# Patient Record
Sex: FEMALE | Race: WHITE | NOT HISPANIC OR LATINO | URBAN - NONMETROPOLITAN AREA
[De-identification: names, ages, dates, MRNs, and addresses within clinical notes are randomized per-mention and may not be internally consistent; named-entity substitution may affect disease eponyms.]

---

## 2022-11-03 ENCOUNTER — OFFICE VISIT (OUTPATIENT)
Dept: FAMILY MEDICINE CLINIC | Facility: CLINIC | Age: 27
End: 2022-11-03

## 2022-11-03 VITALS
DIASTOLIC BLOOD PRESSURE: 76 MMHG | HEART RATE: 67 BPM | RESPIRATION RATE: 16 BRPM | BODY MASS INDEX: 24.44 KG/M2 | HEIGHT: 69 IN | TEMPERATURE: 99.3 F | SYSTOLIC BLOOD PRESSURE: 112 MMHG | WEIGHT: 165 LBS

## 2022-11-03 DIAGNOSIS — Z23 ENCOUNTER FOR IMMUNIZATION: ICD-10-CM

## 2022-11-03 DIAGNOSIS — N63.12 MASS OF UPPER INNER QUADRANT OF RIGHT BREAST: Primary | ICD-10-CM

## 2022-11-03 DIAGNOSIS — Z23 NEEDS FLU SHOT: ICD-10-CM

## 2022-11-03 DIAGNOSIS — Z76.89 ENCOUNTER TO ESTABLISH CARE: ICD-10-CM

## 2022-11-03 DIAGNOSIS — R19.8 ALTERNATING CONSTIPATION AND DIARRHEA: ICD-10-CM

## 2022-11-03 DIAGNOSIS — N64.4 BREAST PAIN, RIGHT: ICD-10-CM

## 2022-11-03 PROCEDURE — 90471 IMMUNIZATION ADMIN: CPT | Performed by: NURSE PRACTITIONER

## 2022-11-03 PROCEDURE — 99204 OFFICE O/P NEW MOD 45 MIN: CPT | Performed by: NURSE PRACTITIONER

## 2022-11-03 PROCEDURE — 90686 IIV4 VACC NO PRSV 0.5 ML IM: CPT | Performed by: NURSE PRACTITIONER

## 2022-11-03 NOTE — PROGRESS NOTES
Subjective     Chief Complaint   Patient presents with   • Establish Care   • Breast Pain     Right side    • Breast Mass       History of Present Illness    Masses and pain in right breast that started in March of this year.  Had US, MRI, and mammogram of breast in [place] that were inconclusive 6 months ago.  Ultrasound showed 7mm mass in upper inner right breast.  Strong family history of breast cancer.  Pain was so intense that she was taking percocet but states it did not help.  Pain is 8/10 constant ache with intermittent sharpness in right breast.  Pain radiates in to right shoulder. She has not had any biopsies or genetic testing done yet because of the timing of her move.   She has had colposcopies before for abnormal pap smears.  She had a partial hysterectomy in 2021.      She has fecal incontinence and fecal urgency since 2017, after her gastric bypass surgery.  Bowel pattern is irregular.  No BM for 3-4 days and then will have liquid yellow stool.  She has tried diet modifications without any improvement in symptoms.  Does not take any medication for constipation or diarrhea.      She is establishing care.      Patient's PMR from outside medical facility reviewed and noted.    Review of Systems   Constitutional: Negative for activity change, appetite change, fatigue and fever.   Respiratory: Negative for cough and shortness of breath.    Cardiovascular: Negative for chest pain.        Otherwise complete ROS reviewed and negative except as mentioned in the HPI.    Past Medical History:   Past Medical History:   Diagnosis Date   • Anemia    • Anxiety    • Depression    • Headache    • Obesity      Past Surgical History:  Past Surgical History:   Procedure Laterality Date   • BARIATRIC SURGERY     •  SECTION     • CHOLECYSTECTOMY     • COSMETIC SURGERY      tummy tuck   • HYSTERECTOMY     • TONSILLECTOMY       Social History:  reports that she has never smoked. She has  "never used smokeless tobacco. She reports that she does not drink alcohol and does not use drugs.    Family History: family history includes Breast cancer in her maternal grandmother and paternal grandmother; Breast cancer (age of onset: 41) in her mother; Cancer in her mother; Heart disease in her father; Hyperlipidemia in her father; Hypertension in her father.      Allergies:  Allergies   Allergen Reactions   • Nsaids Other (See Comments)     Previous weight loss surgery     Medications:  Prior to Admission medications    Not on File       PHQ-9 Depression Screening  Little interest or pleasure in doing things? 0-->not at all   Feeling down, depressed, or hopeless? 0-->not at all   Trouble falling or staying asleep, or sleeping too much?     Feeling tired or having little energy?     Poor appetite or overeating?     Feeling bad about yourself - or that you are a failure or have let yourself or your family down?     Trouble concentrating on things, such as reading the newspaper or watching television?     Moving or speaking so slowly that other people could have noticed? Or the opposite - being so fidgety or restless that you have been moving around a lot more than usual?     Thoughts that you would be better off dead, or of hurting yourself in some way?     PHQ-9 Total Score 0   If you checked off any problems, how difficult have these problems made it for you to do your work, take care of things at home, or get along with other people?         PHQ-9 Total Score: 0   0 (Negative screening for depression)      Objective     Vital Signs: /76 (BP Location: Left arm, Patient Position: Sitting, Cuff Size: Adult)   Pulse 67   Temp 99.3 °F (37.4 °C) (Infrared)   Resp 16   Ht 175.3 cm (69\")   Wt 74.8 kg (165 lb)   BMI 24.37 kg/m²   Physical Exam  Vitals and nursing note reviewed.   Constitutional:       Appearance: Normal appearance.   HENT:      Head: Normocephalic.      Nose: Nose normal.      Mouth/Throat: "      Mouth: Mucous membranes are moist.   Eyes:      Extraocular Movements: Extraocular movements intact.      Pupils: Pupils are equal, round, and reactive to light.   Cardiovascular:      Rate and Rhythm: Normal rate and regular rhythm.      Pulses: Normal pulses.      Heart sounds: Normal heart sounds.   Pulmonary:      Effort: Pulmonary effort is normal.      Breath sounds: Normal breath sounds.   Chest:   Breasts:     Right: Mass (Small, firm mass with irregular borders palpated in RUQ) and tenderness present. No swelling, bleeding, inverted nipple, nipple discharge or skin change.      Left: Normal.   Abdominal:      General: Bowel sounds are normal.      Palpations: Abdomen is soft.   Musculoskeletal:         General: Normal range of motion.      Cervical back: Normal range of motion.   Skin:     General: Skin is warm and dry.   Neurological:      General: No focal deficit present.      Mental Status: She is alert and oriented to person, place, and time.   Psychiatric:         Mood and Affect: Mood normal.         Behavior: Behavior normal.         Thought Content: Thought content normal.         Judgment: Judgment normal.         BMI is within normal parameters. No other follow-up for BMI required.         Results Reviewed:  No results found for: GLUCOSE, BUN, CREATININE, NA, K, CL, CO2, CALCIUM, ALT, AST, WBC, HCT, PLT, CHOL, TRIG, HDL, LDL, LDLHDL, HGBA1C      Assessment / Plan     Assessment/Plan     Diagnoses and all orders for this visit:    1. Mass of upper inner quadrant of right breast (Primary)  -     Mammo Diagnostic Digital Tomosynthesis Bilateral With CAD; Future  -     Cancel: Ambulatory Referral to Obstetrics / Gynecology  -     Cancel: US Breast Right Complete; Future  -     MRI Breast Right With & Without Contrast; Future  -     Ambulatory Referral to General Surgery    2. Breast pain, right  -     Mammo Diagnostic Digital Tomosynthesis Bilateral With CAD; Future  -     Cancel: Ambulatory  Referral to Obstetrics / Gynecology  -     Cancel: US Breast Right Complete; Future  -     MRI Breast Right With & Without Contrast; Future  -     Ambulatory Referral to General Surgery    3. Alternating constipation and diarrhea  -     Ambulatory Referral to Gastroenterology    4. Encounter to establish care    5. Encounter for immunization  -     FluLaval/Fluzone >6 mos ([id number])    6. Needs flu shot  -     FluLaval/Fluzone >6 mos ([id number])      Time spent with patient: 35 minutes       An After Visit Summary was printed and given to the patient at discharge.  Return in about 4 weeks (around 12/1/2022) for Annual physical.    I have discussed the patient results/orders and plan/recommendation with them at today's visit.      Nicol Paula, APRN   11/03/2022

## 2022-11-07 ENCOUNTER — TRANSCRIBE ORDERS (OUTPATIENT)
Dept: ADMINISTRATIVE | Facility: HOSPITAL | Age: 27
End: 2022-11-07

## 2022-11-08 ENCOUNTER — HOSPITAL ENCOUNTER (OUTPATIENT)
Dept: ULTRASOUND IMAGING | Facility: HOSPITAL | Age: 27
Discharge: HOME | End: 2022-11-08

## 2022-11-08 ENCOUNTER — TELEPHONE (OUTPATIENT)
Dept: FAMILY MEDICINE CLINIC | Facility: CLINIC | Age: 27
End: 2022-11-08

## 2022-11-08 ENCOUNTER — HOSPITAL ENCOUNTER (OUTPATIENT)
Dept: MAMMOGRAPHY | Facility: HOSPITAL | Age: 27
Discharge: HOME | End: 2022-11-08

## 2022-11-08 DIAGNOSIS — N63.20 LEFT BREAST LUMP: ICD-10-CM

## 2022-11-08 DIAGNOSIS — N64.4 BREAST PAIN, RIGHT: ICD-10-CM

## 2022-11-08 DIAGNOSIS — N63.12 MASS OF UPPER INNER QUADRANT OF RIGHT BREAST: ICD-10-CM

## 2022-11-08 PROCEDURE — G0279 TOMOSYNTHESIS, MAMMO: HCPCS

## 2022-11-08 PROCEDURE — 76642 ULTRASOUND BREAST LIMITED: CPT

## 2022-11-08 PROCEDURE — 77066 DX MAMMO INCL CAD BI: CPT

## 2022-11-08 NOTE — TELEPHONE ENCOUNTER
Pt called to update us on what happened at her US today as well as give a more detailed symptom explanation of the mass in breast.    PT says the mass has definitely grown since she discovered it in [place]. PT says the pain has also spread from being localized in her breast to pain in her breast, shoulder, and even starting to feel in throat area. Pt is open to a preemptive removal.. she stated she has been doing research on her own and looking at the cancer center info from where her mom went. Pt mentioned she definitely feels she is having issue with inflammation and the spot being warm to the touch. Pt also states she was told where the mass is it didn't seem to show anything, but there was image around that area.    Results are in chart for you to look at. Pt also has referral routed to your basket due to needing to change to a referral.

## 2022-11-11 ENCOUNTER — HOSPITAL ENCOUNTER (OUTPATIENT)
Dept: MRI IMAGING | Facility: HOSPITAL | Age: 27
Discharge: HOME | End: 2022-11-11
Admitting: NURSE PRACTITIONER

## 2022-11-11 DIAGNOSIS — N64.4 BREAST PAIN, RIGHT: ICD-10-CM

## 2022-11-11 DIAGNOSIS — N63.12 MASS OF UPPER INNER QUADRANT OF RIGHT BREAST: ICD-10-CM

## 2022-11-11 LAB — CREAT BLDA-MCNC: 0.7 MG/DL (ref 0.6–1.3)

## 2022-11-11 PROCEDURE — 0 GADOBENATE DIMEGLUMINE 529 MG/ML SOLUTION: Performed by: NURSE PRACTITIONER

## 2022-11-11 PROCEDURE — A9577 INJ MULTIHANCE: HCPCS | Performed by: NURSE PRACTITIONER

## 2022-11-11 PROCEDURE — 82565 ASSAY OF CREATININE: CPT

## 2022-11-11 PROCEDURE — 77049 MRI BREAST C-+ W/CAD BI: CPT

## 2022-11-11 RX ADMIN — GADOBENATE DIMEGLUMINE 15 ML: 529 INJECTION, SOLUTION INTRAVENOUS at 10:19

## 2022-11-14 ENCOUNTER — APPOINTMENT (OUTPATIENT)
Dept: LAB | Facility: HOSPITAL | Age: 27
End: 2022-11-14

## 2022-11-14 ENCOUNTER — OFFICE VISIT (OUTPATIENT)
Dept: SURGERY | Facility: CLINIC | Age: 27
End: 2022-11-14

## 2022-11-14 VITALS
HEIGHT: 69 IN | SYSTOLIC BLOOD PRESSURE: 118 MMHG | DIASTOLIC BLOOD PRESSURE: 80 MMHG | BODY MASS INDEX: 24.42 KG/M2 | HEART RATE: 70 BPM | WEIGHT: 164.9 LBS | TEMPERATURE: 97.5 F

## 2022-11-14 DIAGNOSIS — N64.4 BREAST PAIN, RIGHT: ICD-10-CM

## 2022-11-14 DIAGNOSIS — N63.12 MASS OF UPPER INNER QUADRANT OF RIGHT BREAST: Primary | ICD-10-CM

## 2022-11-14 DIAGNOSIS — Z80.3 FAMILY HISTORY OF BREAST CANCER: Primary | ICD-10-CM

## 2022-11-14 DIAGNOSIS — Z80.3 FAMILY HISTORY OF MALIGNANT NEOPLASM OF BREAST: ICD-10-CM

## 2022-11-14 DIAGNOSIS — N64.4 MASTODYNIA: ICD-10-CM

## 2022-11-14 DIAGNOSIS — D24.1 FIBROADENOMA OF BREAST, RIGHT: ICD-10-CM

## 2022-11-14 DIAGNOSIS — Z91.89 INCREASED RISK OF BREAST CANCER: ICD-10-CM

## 2022-11-14 PROCEDURE — 99204 OFFICE O/P NEW MOD 45 MIN: CPT | Performed by: STUDENT IN AN ORGANIZED HEALTH CARE EDUCATION/TRAINING PROGRAM

## 2022-11-14 NOTE — PROGRESS NOTES
Office New Patient History and Physical:     Referring Provider: Nicol Paula*    Chief Complaint   Patient presents with   • Abnormal Breast Imaging        Subjective .     History of present illness:  Rena Quiroz is a 27 y.o. female who presented to her PCP with a right breast mass that she first noticed in . She had an US and MRI in [place] 6 months ago which were inconclusive. She has a significant family history of breat cancer. She also endorses right breast pain that required percocet and is an 8/10. She describes it as aching and sharp. It radiates to her R shoulder. She has not had genetic testing nor a biopsy. Her PCP felt a mass in the upper inner quadrant of the R breast with irregular borders. She continues to complain of right medial upper breat pain. On the bad days it covers her whole breast. She has tried many different bras, lidocaine patches, antibiotics, percocet - nothing helped with the pain     Breast History information:   Prior abnormal mammograms: yes 6 month ago with likely benign fibroadenoma   Prior breast biopsies: none   Palpable breast masses: yes upper inner right breast   Nipple discharge: none   Age at first menses: 10  Age at menopause: s/p partial hysterectomy in    Number of biological children: 2  Age at first birth: 25  Years on birth control: 7  Years on HRT: none   Family history of breast cancer: Maternal grandmother, paternal grandmother, mother at age 41   Smoking History: none   Alcohol use: none   BMI: 24     She is not on blood thinners. She has not had genetic testing.     History  Past Medical History:   Diagnosis Date   • Anemia    • Anxiety    • Depression    • Headache    • Obesity    ,   Past Surgical History:   Procedure Laterality Date   • BARIATRIC SURGERY     •  SECTION     • CHOLECYSTECTOMY     • COSMETIC SURGERY      tummy tuck   • HYSTERECTOMY     • TONSILLECTOMY     ,   Family History   Problem Relation Age of Onset   •  "Breast cancer Mother 41   • Cancer Mother    • Hypertension Father    • Hyperlipidemia Father    • Heart disease Father    • Breast cancer Maternal Grandmother    • Breast cancer Paternal Grandmother    ,   Social History     Tobacco Use   • Smoking status: Never   • Smokeless tobacco: Never   Vaping Use   • Vaping Use: Never used   Substance Use Topics   • Alcohol use: Never   • Drug use: Never   , (Not in a hospital admission)   and Allergies:  Nsaids    Current Outpatient Medications:   •  Diclofenac Sodium (Voltaren) 1 % gel gel, Apply 4 g topically to the appropriate area as directed 4 (Four) Times a Day As Needed (right breast pain)., Disp: 100 g, Rfl: 1    Objective     Vital Signs   /80 (BP Location: Left arm, Patient Position: Sitting, Cuff Size: Adult)   Pulse 70   Temp 97.5 °F (36.4 °C)   Ht 175.3 cm (69.02\")   Wt 74.8 kg (164 lb 14.5 oz)   BMI 24.34 kg/m²      Physical Exam:  General appearance - alert, well appearing, and in no distress  Mental status - alert, oriented to person, place, and time  Eyes - pupils equal and reactive, extraocular eye movements intact  Neck - supple, no significant adenopathy  Chest - no tachypnea, retractions or cyanosis  Heart - normal rate and regular rhythm  Abdomen - soft, nontender, nondistended, no masses or organomegaly  Neurological - alert, oriented, normal speech, no focal findings or movement disorder noted  Musculoskeletal - no joint tenderness, deformity or swelling  Breast Exam: Bilateral breasts without obvious deformities. Bilateral nipples everted. Patient examined in the supine position with the ipsilateral arm above the head. Right upper inner breast <1cm well circumscribed, non-fixed mass. Tender. No other palpable masses bilaterally. No nipple discharge bilaterally. No palpable axillary nor supraclavicular adenopathy bilaterally.         Results Review:    The following data was reviewed by: Sharita Chavira MD on 11/14/2022:    Mammo " Diagnostic Digital Tomosynthesis Bilateral With CAD (11/08/2022 13:48)  1. No sonographic correlate for the areas of discomfort and masslike  densities in the medial aspect of both breasts. There is also no  mammographic correlate. There is a benign-appearing intramammary lymph  node in the upper outer quadrant of the right breast at the 10:00  position documented by ultrasound.  2. Recommend close clinical follow-up of the areas of palpable  abnormality. The patient has undergone previous outside breast MRI for which 6 month follow-up imaging was recommended. The patient is scheduled for a follow-up MRI at this facility later in the week.  3. ACR BI-RADS Category 2 benign findings negative.  US Breast Bilateral Limited (11/08/2022 14:39)  1.. Small intramammary lymph node upper outer quadrant right breast.  2. No sonographic correlate for the areas of palpable abnormality within either breast.  3. ACR BI-RADS 2 benign findings negative.  MRI Breast Bilateral With & Without Contrast (11/11/2022 10:18)  1. Stable 7 to 8 mm enhancing lesion within the upper inner right breast  near 2:00 with type I benign contrast kinetics remains stable compared  to the outside MRI breast exam from 4/11/2022.. With the benign contrast  kinetics and stability, a benign fibroadenomas favored. No discrete  ultrasound correlate based on recent ultrasound exam. Findings are  consistent with a probably benign study strongly favoring fibroadenoma.  A six-month follow-up diagnostic mammogram recommended with an MRI breast exam to establish stability over greater than one year.  2. No suspicious abnormal enhancement of the left breast. Few  subcentimeter benign cysts of the medial left breast at 8-9 o'clock.  3. No pathologic axillary or internal mammary lymphadenopathy.  4. BI-RADS 3-probably benign exam. Probably benign fibroadenoma within  the upper inner right breast at 2:00 as described above. Six-month  follow-up diagnostic mammogram  and MRI breast study recommended to  assure stability over greater than one year.  Progress Notes by Nicol Paula APRN (11/03/2022 14:00)    Assessment & Plan       Diagnoses and all orders for this visit:    1. Mass of upper inner quadrant of right breast (Primary)  -     MRI Breast Bilateral With & Without Contrast; Future  -     US Breast Right Limited; Future    2. Family history of malignant neoplasm of breast  -     Ambulatory Referral to Genetic Counseling/Testing    3. Increased risk of breast cancer  -     Ambulatory Referral to Genetic Counseling/Testing    4. Mastodynia    Other orders  -     Diclofenac Sodium (Voltaren) 1 % gel gel; Apply 4 g topically to the appropriate area as directed 4 (Four) Times a Day As Needed (right breast pain).  Dispense: 100 g; Refill: 1         Rena Richie is a 27 y.o. 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 I have personally reviewed the note from her PCP above as well as the imaging including her MRI, mammogram and US. This lesion is stable compared to 6 months ago, has benign features, and is only 7-8 mm. I have recommended continued monitoring. We will plan to repeat R breast US and bilateral MRI in 6 months, if stable at that point will have evidence of stability x 1 year and will be able to return to normal screening. For her breast pain, I have recommended diclofenac topical therapy as well as a trial of evening primrose oil. She is amenable to this plan.     She also has a high risk of breast cancer based on her family history of mother, paternal grandmother and maternal grandmother with history of breast cancer. She was set up for genetic testing in her last city but it was not able to be done before she moved. I have ordered genetic counseling and testing. She will go over to the [facility] today. I will call her with her genetic testing results.   I went over risk reduction and early detection strategies with her.  (1) Early detection: twice yearly clinical breast exam. Yearly bilateral breast MRI and yearly bilateral mammogram so there is imaging every 6 months. (2) Prevention: First step of prevention is lifestyle modification with healthy diet, maintaining a healthy BMI, smoking cessation (including vaping), regular exercise, <2 alcoholic drinks per day. The second option on prevention is chemoprophylaxis with anti-hormone therapy. We discussed the [id number]% risk reduction with medication. I offered her a referral to medical oncology to discuss risks and benefits in detail and she elected to defer at this time. Third option is surgical prevention with bilateral prophylactic mastectomies with or without reconstruction. We discussed that this is a big operation and will require 1-2 months of recovery with risks of bleeding, infection, damage to surrounding structures. She understands that bilateral mastectomy does not decrease her risk to 0%. She has elected to defer at this time, however if she does have a genetic predisposition she is amenable to prophylactic surgery.     Follow up in 6 months after MRI and US. But I will call you with the genetics results.    BMI is within normal parameters. No other follow-up for BMI required.      Sharita Chavira MD  11/15/22  08:19 CST

## 2022-12-05 ENCOUNTER — CLINICAL SUPPORT (OUTPATIENT)
Dept: FAMILY MEDICINE CLINIC | Facility: CLINIC | Age: 27
End: 2022-12-05

## 2022-12-05 DIAGNOSIS — Z11.59 ENCOUNTER FOR HEPATITIS C SCREENING TEST FOR LOW RISK PATIENT: ICD-10-CM

## 2022-12-05 DIAGNOSIS — Z00.00 ANNUAL PHYSICAL EXAM: Primary | ICD-10-CM

## 2022-12-05 DIAGNOSIS — Z80.3 FAMILY HISTORY OF BREAST CANCER: ICD-10-CM

## 2022-12-05 PROCEDURE — 36415 COLL VENOUS BLD VENIPUNCTURE: CPT | Performed by: NURSE PRACTITIONER

## 2022-12-05 NOTE — PROGRESS NOTES
Venipuncture Blood Specimen Collection  Venipuncture performed in [facility] by Aixa Souza LPN with good hemostasis. Patient tolerated the procedure well without complications.   12/05/22   Aixa Souza LPN

## 2022-12-06 LAB
ALBUMIN SERPL-MCNC: 4.6 G/DL (ref 3.9–5)
ALBUMIN/GLOB SERPL: 1.9 {RATIO} (ref 1.2–2.2)
ALP SERPL-CCNC: 88 IU/L (ref 44–121)
ALT SERPL-CCNC: 14 IU/L (ref 0–32)
AST SERPL-CCNC: 11 IU/L (ref 0–40)
BASOPHILS # BLD AUTO: 0 X10E3/UL (ref 0–0.2)
BASOPHILS NFR BLD AUTO: 0 %
BILIRUB SERPL-MCNC: 2.2 MG/DL (ref 0–1.2)
BUN SERPL-MCNC: 9 MG/DL (ref 6–20)
BUN/CREAT SERPL: 13 (ref 9–23)
CALCIUM SERPL-MCNC: 9.2 MG/DL (ref 8.7–10.2)
CHLORIDE SERPL-SCNC: 104 MMOL/L (ref 96–106)
CHOLEST SERPL-MCNC: 137 MG/DL (ref 100–199)
CO2 SERPL-SCNC: 25 MMOL/L (ref 20–29)
CREAT SERPL-MCNC: 0.71 MG/DL (ref 0.57–1)
EGFRCR SERPLBLD CKD-EPI 2021: 118 ML/MIN/1.73
EOSINOPHIL # BLD AUTO: 0 X10E3/UL (ref 0–0.4)
EOSINOPHIL NFR BLD AUTO: 1 %
ERYTHROCYTE [DISTWIDTH] IN BLOOD BY AUTOMATED COUNT: 12.2 % (ref 11.7–15.4)
GLOBULIN SER CALC-MCNC: 2.4 G/DL (ref 1.5–4.5)
GLUCOSE SERPL-MCNC: 87 MG/DL (ref 70–99)
HBA1C MFR BLD: 5.2 % (ref 4.8–5.6)
HCT VFR BLD AUTO: 41 % (ref 34–46.6)
HCV AB S/CO SERPL IA: <0.1 S/CO RATIO (ref 0–0.9)
HDLC SERPL-MCNC: 63 MG/DL
HGB BLD-MCNC: 13.4 G/DL (ref 11.1–15.9)
IMM GRANULOCYTES # BLD AUTO: 0 X10E3/UL (ref 0–0.1)
IMM GRANULOCYTES NFR BLD AUTO: 0 %
LDLC SERPL CALC-MCNC: 65 MG/DL (ref 0–99)
LYMPHOCYTES # BLD AUTO: 1.7 X10E3/UL (ref 0.7–3.1)
LYMPHOCYTES NFR BLD AUTO: 42 %
MCH RBC QN AUTO: 28.5 PG (ref 26.6–33)
MCHC RBC AUTO-ENTMCNC: 32.7 G/DL (ref 31.5–35.7)
MCV RBC AUTO: 87 FL (ref 79–97)
MONOCYTES # BLD AUTO: 0.2 X10E3/UL (ref 0.1–0.9)
MONOCYTES NFR BLD AUTO: 6 %
NEUTROPHILS # BLD AUTO: 2.1 X10E3/UL (ref 1.4–7)
NEUTROPHILS NFR BLD AUTO: 51 %
PLATELET # BLD AUTO: 182 X10E3/UL (ref 150–450)
POTASSIUM SERPL-SCNC: 4 MMOL/L (ref 3.5–5.2)
PROT SERPL-MCNC: 7 G/DL (ref 6–8.5)
RBC # BLD AUTO: 4.7 X10E6/UL (ref 3.77–5.28)
SODIUM SERPL-SCNC: 142 MMOL/L (ref 134–144)
T4 FREE SERPL-MCNC: 1.18 NG/DL (ref 0.82–1.77)
TRIGL SERPL-MCNC: 37 MG/DL (ref 0–149)
TSH SERPL DL<=0.005 MIU/L-ACNC: 0.93 UIU/ML (ref 0.45–4.5)
VLDLC SERPL CALC-MCNC: 9 MG/DL (ref 5–40)
WBC # BLD AUTO: 4.1 X10E3/UL (ref 3.4–10.8)

## 2022-12-09 ENCOUNTER — OFFICE VISIT (OUTPATIENT)
Dept: FAMILY MEDICINE CLINIC | Facility: CLINIC | Age: 27
End: 2022-12-09

## 2022-12-09 VITALS
SYSTOLIC BLOOD PRESSURE: 106 MMHG | BODY MASS INDEX: 24.71 KG/M2 | WEIGHT: 166.8 LBS | HEIGHT: 69 IN | RESPIRATION RATE: 16 BRPM | OXYGEN SATURATION: 100 % | HEART RATE: 67 BPM | TEMPERATURE: 98.9 F | DIASTOLIC BLOOD PRESSURE: 68 MMHG

## 2022-12-09 DIAGNOSIS — R68.82 DECREASED SEX DRIVE: ICD-10-CM

## 2022-12-09 DIAGNOSIS — Z00.00 ANNUAL PHYSICAL EXAM: Primary | ICD-10-CM

## 2022-12-09 DIAGNOSIS — R53.83 FATIGUE, UNSPECIFIED TYPE: ICD-10-CM

## 2022-12-09 DIAGNOSIS — N89.8 VAGINAL DRYNESS: ICD-10-CM

## 2022-12-09 DIAGNOSIS — M62.08 DIASTASIS RECTI: ICD-10-CM

## 2022-12-09 DIAGNOSIS — R17 ELEVATED BILIRUBIN: ICD-10-CM

## 2022-12-09 DIAGNOSIS — N94.10 PAIN IN FEMALE GENITALIA ON INTERCOURSE: ICD-10-CM

## 2022-12-09 PROCEDURE — 99395 PREV VISIT EST AGE 18-39: CPT | Performed by: NURSE PRACTITIONER

## 2022-12-09 NOTE — PROGRESS NOTES
Venipuncture Blood Specimen Collection  Venipuncture performed in [facility] by Aixa Souza LPN with good hemostasis. Patient tolerated the procedure well without complications.   12/09/22   Aixa Souza LPN

## 2022-12-09 NOTE — PROGRESS NOTES
Subjective     No chief complaint on file.      History of Present Illness    Patient presents today for her annual physical.  She is having hot flashes, sweating through her clothes multiple times a day.  She also has a decreased libido.  Has vaginal dryness and sexual intercourse is painful.  She is also concerned that her diastasis recti is not closing properly after her tummy tuck 2 years ago.  She used to get vitamin B12 injections and is wanting to restart them.  Labs reviewed with patient.  All labs normal with exception of bilirubin of 2.2.       Patient's PMR from outside medical facility reviewed and noted.    Review of Systems   Constitutional: Positive for fatigue.   Endocrine: Positive for heat intolerance.   Genitourinary: Positive for vaginal pain (with intercourse). Negative for pelvic pain.        Otherwise complete ROS reviewed and negative except as mentioned in the HPI.    Past Medical History:   Past Medical History:   Diagnosis Date   • Anemia    • Anxiety    • Depression    • Headache    • Obesity      Past Surgical History:  Past Surgical History:   Procedure Laterality Date   • BARIATRIC SURGERY     •  SECTION     • CHOLECYSTECTOMY     • COSMETIC SURGERY      tummy tuck   • HYSTERECTOMY     • TONSILLECTOMY       Social History:  reports that she has never smoked. She has never used smokeless tobacco. She reports that she does not drink alcohol and does not use drugs.    Family History: family history includes Breast cancer in her maternal grandmother and paternal grandmother; Breast cancer (age of onset: 41) in her mother; Cancer in her mother; Heart disease in her father; Hyperlipidemia in her father; Hypertension in her father.      Allergies:  Allergies   Allergen Reactions   • Nsaids Other (See Comments)     Previous weight loss surgery     Medications:  Prior to Admission medications    Medication Sig Start Date End Date Taking? Authorizing Provider   Diclofenac Sodium  "(Voltaren) 1 % gel gel Apply 4 g topically to the appropriate area as directed 4 (Four) Times a Day As Needed (right breast pain). 11/14/22   Sharita Chavira MD         Objective     Vital Signs: /68 (BP Location: Right arm, Patient Position: Sitting, Cuff Size: Adult)   Pulse 67   Temp 98.9 °F (37.2 °C) (Infrared)   Resp 16   Ht 175.3 cm (69\")   Wt 75.7 kg (166 lb 12.8 oz)   SpO2 100%   BMI 24.63 kg/m²   Physical Exam  Vitals and nursing note reviewed.   Constitutional:       Appearance: Normal appearance.   HENT:      Head: Normocephalic.      Right Ear: Tympanic membrane normal.      Left Ear: Tympanic membrane normal.      Nose: Nose normal.      Mouth/Throat:      Mouth: Mucous membranes are moist.   Eyes:      Extraocular Movements: Extraocular movements intact.      Pupils: Pupils are equal, round, and reactive to light.   Cardiovascular:      Rate and Rhythm: Normal rate and regular rhythm.      Pulses: Normal pulses.      Heart sounds: Normal heart sounds.   Pulmonary:      Effort: Pulmonary effort is normal.      Breath sounds: Normal breath sounds.   Abdominal:      General: Bowel sounds are normal.      Palpations: Abdomen is soft.      Comments: Diastasis recti present superior to the umbilicus.     Musculoskeletal:         General: Normal range of motion.      Cervical back: Normal range of motion.   Skin:     General: Skin is warm and dry.   Neurological:      General: No focal deficit present.      Mental Status: She is alert and oriented to person, place, and time.   Psychiatric:         Mood and Affect: Mood normal.         Behavior: Behavior normal.         Thought Content: Thought content normal.         Judgment: Judgment normal.         BMI is within normal parameters. No other follow-up for BMI required.           Results Reviewed:  Glucose   Date Value Ref Range Status   12/05/2022 87 70 - 99 mg/dL Final     BUN   Date Value Ref Range Status   12/05/2022 9 6 - 20 mg/dL Final "     Creatinine   Date Value Ref Range Status   12/05/2022 0.71 0.57 - 1.00 mg/dL Final   11/11/2022 0.70 0.60 - 1.30 mg/dL Final     Comment:     Serial Number: [id number]:  [id number]     Sodium   Date Value Ref Range Status   12/05/2022 142 134 - 144 mmol/L Final     Potassium   Date Value Ref Range Status   12/05/2022 4.0 3.5 - 5.2 mmol/L Final     Chloride   Date Value Ref Range Status   12/05/2022 104 96 - 106 mmol/L Final     Total CO2   Date Value Ref Range Status   12/05/2022 25 20 - 29 mmol/L Final     Calcium   Date Value Ref Range Status   12/05/2022 9.2 8.7 - 10.2 mg/dL Final     ALT (SGPT)   Date Value Ref Range Status   12/05/2022 14 0 - 32 IU/L Final     AST (SGOT)   Date Value Ref Range Status   12/05/2022 11 0 - 40 IU/L Final     WBC   Date Value Ref Range Status   12/05/2022 4.1 3.4 - 10.8 x10E3/uL Final     Hematocrit   Date Value Ref Range Status   12/05/2022 41.0 34.0 - 46.6 % Final     Platelets   Date Value Ref Range Status   12/05/2022 182 150 - 450 x10E3/uL Final     Triglycerides   Date Value Ref Range Status   12/05/2022 37 0 - 149 mg/dL Final     HDL Cholesterol   Date Value Ref Range Status   12/05/2022 63 >39 mg/dL Final     LDL Chol Calc (NIH)   Date Value Ref Range Status   12/05/2022 65 0 - 99 mg/dL Final     Hemoglobin A1C   Date Value Ref Range Status   12/05/2022 5.2 4.8 - 5.6 % Final     Comment:              Prediabetes: 5.7 - 6.4           Diabetes: >6.4           Glycemic control for adults with diabetes: <7.0           Assessment / Plan     Assessment/Plan     Diagnoses and all orders for this visit:    1. Annual physical exam (Primary)  Comments:  Patient counseled on benefits of increasing daily physical activity.    2. Vaginal dryness  -     Ambulatory Referral to Obstetrics / Gynecology    3. Elevated bilirubin  -     Comprehensive metabolic panel  -     Hepatitis Panel, Acute    4. Fatigue, unspecified type  Comments:  Counseled on possible causes of fatigue including  vitamin D deficiency and vitamin B12 deficiency.    Orders:  -     Vitamin D,25-Hydroxy  -     Vitamin B12    5. Pain in female genitalia on intercourse  -     Ambulatory Referral to Obstetrics / Gynecology    6. Decreased sex drive  -     Ambulatory Referral to Obstetrics / Gynecology    7. Diastasis recti  -     Ambulatory Referral to Physical Therapy Pelvic Floor         An After Visit Summary was printed and given to the patient at discharge.  Return in about 1 year (around 12/9/2023) for Annual physical.    I have discussed the patient results/orders and plan/recommendation with them at today's visit.      Nicol Paula, APRN   12/09/2022

## 2022-12-10 LAB
25(OH)D3+25(OH)D2 SERPL-MCNC: 18.5 NG/ML (ref 30–100)
ALBUMIN SERPL-MCNC: 4.7 G/DL (ref 3.9–5)
ALBUMIN/GLOB SERPL: 2 {RATIO} (ref 1.2–2.2)
ALP SERPL-CCNC: 84 IU/L (ref 44–121)
ALT SERPL-CCNC: 13 IU/L (ref 0–32)
AST SERPL-CCNC: 16 IU/L (ref 0–40)
BILIRUB SERPL-MCNC: 2 MG/DL (ref 0–1.2)
BUN SERPL-MCNC: 10 MG/DL (ref 6–20)
BUN/CREAT SERPL: 15 (ref 9–23)
CALCIUM SERPL-MCNC: 9.3 MG/DL (ref 8.7–10.2)
CHLORIDE SERPL-SCNC: 102 MMOL/L (ref 96–106)
CO2 SERPL-SCNC: 26 MMOL/L (ref 20–29)
CREAT SERPL-MCNC: 0.68 MG/DL (ref 0.57–1)
EGFRCR SERPLBLD CKD-EPI 2021: 121 ML/MIN/1.73
GLOBULIN SER CALC-MCNC: 2.4 G/DL (ref 1.5–4.5)
GLUCOSE SERPL-MCNC: 78 MG/DL (ref 70–99)
HAV IGM SERPL QL IA: NEGATIVE
HBV CORE IGM SERPL QL IA: NEGATIVE
HBV SURFACE AG SERPL QL IA: NEGATIVE
HCV AB S/CO SERPL IA: <0.1 S/CO RATIO (ref 0–0.9)
HCV AB SERPL QL IA: NORMAL
POTASSIUM SERPL-SCNC: 4.4 MMOL/L (ref 3.5–5.2)
PROT SERPL-MCNC: 7.1 G/DL (ref 6–8.5)
SODIUM SERPL-SCNC: 142 MMOL/L (ref 134–144)
VIT B12 SERPL-MCNC: 203 PG/ML (ref 232–1245)

## 2022-12-12 ENCOUNTER — OFFICE VISIT (OUTPATIENT)
Dept: GASTROENTEROLOGY | Facility: CLINIC | Age: 27
End: 2022-12-12

## 2022-12-12 ENCOUNTER — OFFICE VISIT (OUTPATIENT)
Dept: SURGERY | Facility: CLINIC | Age: 27
End: 2022-12-12

## 2022-12-12 VITALS
SYSTOLIC BLOOD PRESSURE: 110 MMHG | BODY MASS INDEX: 24.14 KG/M2 | HEIGHT: 69 IN | WEIGHT: 163 LBS | DIASTOLIC BLOOD PRESSURE: 73 MMHG | HEART RATE: 63 BPM

## 2022-12-12 VITALS
DIASTOLIC BLOOD PRESSURE: 76 MMHG | HEIGHT: 69 IN | BODY MASS INDEX: 24.14 KG/M2 | HEART RATE: 90 BPM | TEMPERATURE: 98.6 F | SYSTOLIC BLOOD PRESSURE: 116 MMHG | WEIGHT: 163 LBS | OXYGEN SATURATION: 96 %

## 2022-12-12 DIAGNOSIS — R19.8 ALTERED BOWEL FUNCTION: Primary | ICD-10-CM

## 2022-12-12 DIAGNOSIS — Z80.3 FAMILY HISTORY OF MALIGNANT NEOPLASM OF BREAST: ICD-10-CM

## 2022-12-12 DIAGNOSIS — R19.7 DIARRHEA, UNSPECIFIED TYPE: ICD-10-CM

## 2022-12-12 DIAGNOSIS — N63.12 MASS OF UPPER INNER QUADRANT OF RIGHT BREAST: Primary | ICD-10-CM

## 2022-12-12 DIAGNOSIS — L98.7 GENERALIZED EXCESS AND REDUNDANT SKIN AFTER BARIATRIC SURGERY: Primary | ICD-10-CM

## 2022-12-12 DIAGNOSIS — N64.4 MASTODYNIA: ICD-10-CM

## 2022-12-12 DIAGNOSIS — Z98.84 GENERALIZED EXCESS AND REDUNDANT SKIN AFTER BARIATRIC SURGERY: Primary | ICD-10-CM

## 2022-12-12 DIAGNOSIS — Z91.89 INCREASED RISK OF BREAST CANCER: ICD-10-CM

## 2022-12-12 PROCEDURE — 99214 OFFICE O/P EST MOD 30 MIN: CPT | Performed by: NURSE PRACTITIONER

## 2022-12-12 PROCEDURE — 99214 OFFICE O/P EST MOD 30 MIN: CPT | Performed by: STUDENT IN AN ORGANIZED HEALTH CARE EDUCATION/TRAINING PROGRAM

## 2022-12-12 NOTE — PROGRESS NOTES
"Chief Complaint   Patient presents with   • Diarrhea     Has diarrhea had gastric surgery 5 years ago has problems with bowels  never had colon       PCP: Nicol Paula APRN  REFER: Nicol Paula*    Subjective     HPI    Rena Quiroz referred to office for further evaluation of alternating diarrhea and constipation.  She has experienced altered bowel habit since undergoing gastric bypass 2017.   She reports \"normal\" bowel movement 1-2 days per week.  Other days she experiences diarrhea or no bowel movement at all. She will sometimes experience sharp pains in abdomen.  She will go multiple days without bowel movement then have a day with large amount of diarrhea.  No bright red blood per rectum, no melena.   She denies mucous but does describe stool as bile.   She frequently experiences fecal urgency after eating.    No weight loss.  She has burning in rectum with bowel movement.  No previous colonoscopy.   Denies family history of gluten sensitivity.         Past Medical History:   Diagnosis Date   • Anemia    • Anxiety    • Depression    • Headache    • Obesity        Past Surgical History:   Procedure Laterality Date   • BARIATRIC SURGERY     •  SECTION     • CHOLECYSTECTOMY     • COSMETIC SURGERY      tummy tuck   • HYSTERECTOMY     • TONSILLECTOMY         No outpatient medications have been marked as taking for the 22 encounter (Office Visit) with Miguel Barnes APRN.       Allergies   Allergen Reactions   • Nsaids Other (See Comments)     Previous weight loss surgery       Social History     Socioeconomic History   • Marital status:    Tobacco Use   • Smoking status: Never   • Smokeless tobacco: Never   Vaping Use   • Vaping Use: Never used   Substance and Sexual Activity   • Alcohol use: Never   • Drug use: Never   • Sexual activity: Defer       Review of Systems   Constitutional: Negative for unexpected weight change.   Respiratory: Negative for shortness of " "breath.    Cardiovascular: Negative for chest pain.   Gastrointestinal: Positive for constipation and diarrhea. Negative for abdominal pain and anal bleeding.       Objective     Vitals:    12/12/22 1406   BP: 116/76   Pulse: 90   Temp: 98.6 °F (37 °C)   SpO2: 96%   Weight: 73.9 kg (163 lb)   Height: 175.3 cm (69\")     Body mass index is 24.07 kg/m².    Physical Exam  Constitutional:       Appearance: Normal appearance. She is well-developed.   Eyes:      General: No scleral icterus.  Cardiovascular:      Rate and Rhythm: Regular rhythm.      Heart sounds: Normal heart sounds. No murmur heard.  Pulmonary:      Effort: Pulmonary effort is normal. No accessory muscle usage.      Breath sounds: Normal breath sounds.   Abdominal:      General: Bowel sounds are normal. There is no distension.      Palpations: Abdomen is soft. There is no mass.      Tenderness: There is no abdominal tenderness. There is no guarding or rebound.   Skin:     General: Skin is warm and dry.      Coloration: Skin is not jaundiced.   Neurological:      Mental Status: She is alert.   Psychiatric:         Behavior: Behavior is cooperative.         Imaging Results (Most Recent)     None          Body mass index is 24.07 kg/m².    Assessment & Plan     Diagnoses and all orders for this visit:    1. Altered bowel function (Primary)  -     Case Request; Standing  -     Implement Anesthesia Orders Day of Procedure; Standing  -     Obtain Informed Consent; Standing  -     Case Request    2. Diarrhea, unspecified type  -     Tissue Transglutaminase, IgA; Future         COLONOSCOPY WITH ANESTHESIA (N/A)    Recommend daily use of Miralax, adjust as needed  Encouraged addition of dietary fiber, increasing daily water consumption, as well daily physical activity  To help regulate bowel movement     Miralax prep     May need EGD for biopsy if lab work is positive     Discussed if colonoscopy is normal, suspect IBS-C and importance of controlling " bowels    Advised pt to stop use of NSAIDs, Fish Oil, and MV 5 days prior to procedure, per Dr Isaac protocol.  Tylenol based products are ok to take.  Pt verbalized understanding.      All risks, benefits, alternatives, and indications of colonoscopy procedure have been discussed with the patient. Risks to include perforation of the colon requiring possible surgery or colostomy, risk of bleeding from biopsies or removal of colon tissue, possibility of missing a colon polyp or cancer, or adverse drug reaction.  Benefits to include the diagnosis and management of disease of the colon and rectum. Alternatives to include barium enema, radiographic evaluation, lab testing or no intervention. Pt verbalizes understanding and agrees to proceed with procedure.          Miguel Barnes, APRN  12/12/22          There are no Patient Instructions on file for this visit.

## 2022-12-12 NOTE — PROGRESS NOTES
"Office Established Patient Note:     Referring Provider: No ref. provider found    Chief Complaint   Patient presents with   • Follow-up     Mrs. Quiroz is here for a follow up after her testing.       Subjective .     History of present illness:  Rena Quiroz is a 27 y.o. female with continued breast pain. It is not improving with evening primrose oil. She had a hysterectomy in the past. She does report that her breast pain got worse after she was off birth control.   Genetic testing was negative.     History  Past Medical History:   Diagnosis Date   • Anemia    • Anxiety    • Depression    • Headache    • Obesity    ,   Past Surgical History:   Procedure Laterality Date   • BARIATRIC SURGERY     •  SECTION     • CHOLECYSTECTOMY     • COSMETIC SURGERY      tummy tuck   • HYSTERECTOMY     • TONSILLECTOMY     ,   Family History   Problem Relation Age of Onset   • Breast cancer Mother 41   • Cancer Mother    • Hypertension Father    • Hyperlipidemia Father    • Heart disease Father    • Breast cancer Maternal Grandmother    • Breast cancer Paternal Grandmother    • Colon cancer Paternal Grandfather    • Colon polyps Neg Hx    • Esophageal cancer Neg Hx    ,   Social History     Tobacco Use   • Smoking status: Never   • Smokeless tobacco: Never   Vaping Use   • Vaping Use: Never used   Substance Use Topics   • Alcohol use: Never   • Drug use: Never   , (Not in a hospital admission)   and Allergies:  Nsaids  No current outpatient medications on file.    Objective     Vital Signs   /73 (BP Location: Left arm, Patient Position: Sitting, Cuff Size: Adult)   Pulse 63   Ht 175.3 cm (69.02\")   Wt 73.9 kg (163 lb)   BMI 24.06 kg/m²      Physical Exam:  General appearance - alert, well appearing, and in no distress  Mental status - alert, oriented to person, place, and time  Eyes - pupils equal and reactive, extraocular eye movements intact  Neck - supple, no significant adenopathy  Chest - clear to auscultation, " no wheezes, rales or rhonchi, symmetric air entry  Heart - normal rate and regular rhythm  Abdomen - soft, nontender, nondistended, no masses or organomegaly  Neurological - alert, oriented, normal speech, no focal findings or movement disorder noted  Musculoskeletal - no joint tenderness, deformity or swelling    Results Review:    The following data was reviewed by: Sharita Chavira MD on 12/12/2022:  Genetic Testing - Blood, (11/14/2022 00:00)  Negative     Assessment & Plan       Diagnoses and all orders for this visit:    1. Generalized excess and redundant skin after bariatric surgery (Primary)  -     Ambulatory Referral to Plastic Surgery    2. Mastodynia    3. Increased risk of breast cancer    4. Family history of malignant neoplasm of breast         Will refer to OBGYN for discussion on possible hormones vs. Birth control as she has hot flashes and breast pain. Follow up after MRI and US.     Will refer to plastic surgery for excess skin removal on arm s/p bariatric surgery.     Keep appointment with me on 5/15 after MRI and US.     BMI is within normal parameters. No other follow-up for BMI required.      Sharita Chavira MD  12/12/22  15:18 CST

## 2022-12-12 NOTE — H&P (VIEW-ONLY)
Chief Complaint   Patient presents with   • Diarrhea     Has diarrhea had gastric surgery 5 years ago has problems with bowels  never had colon       PCP: Nicol Paula APRN  REFER: Nicol Paula*    Subjective     HPI    Rena Quiroz referred to office for further evaluation of alternating diarrhea and constipation.  She has experienced altered bowel habit since undergoing gastric bypass 2017.   She reports \"normal\" bowel movement 1-2 days per week.  Other days she experiences diarrhea or no bowel movement at all. She will sometimes experience sharp pains in abdomen.  She will go multiple days without bowel movement then have a day with large amount of diarrhea.  No bright red blood per rectum, no melena.   She denies mucous but does describe stool as bile.   She frequently experiences fecal urgency after eating.    No weight loss.  She has burning in rectum with bowel movement.  No previous colonoscopy.   Denies family history of gluten sensitivity.         Past Medical History:   Diagnosis Date   • Anemia    • Anxiety    • Depression    • Headache    • Obesity        Past Surgical History:   Procedure Laterality Date   • BARIATRIC SURGERY     •  SECTION     • CHOLECYSTECTOMY     • COSMETIC SURGERY      tummy tuck   • HYSTERECTOMY     • TONSILLECTOMY         No outpatient medications have been marked as taking for the 22 encounter (Office Visit) with Miguel Barnes APRN.       Allergies   Allergen Reactions   • Nsaids Other (See Comments)     Previous weight loss surgery       Social History     Socioeconomic History   • Marital status:    Tobacco Use   • Smoking status: Never   • Smokeless tobacco: Never   Vaping Use   • Vaping Use: Never used   Substance and Sexual Activity   • Alcohol use: Never   • Drug use: Never   • Sexual activity: Defer       Review of Systems   Constitutional: Negative for unexpected weight change.   Respiratory: Negative for shortness of  breath.    Cardiovascular: Negative for chest pain.   Gastrointestinal: Positive for constipation and diarrhea. Negative for abdominal pain and anal bleeding.       Objective     Vitals:    12/12/22 1406   BP: 116/76   Pulse: 90   Temp: 98.6 °F (37 °C)   SpO2: 96%   Weight: 73.9 kg (163 lb)   Height: 175.3 cm (69\")     Body mass index is 24.07 kg/m².    Physical Exam  Constitutional:       Appearance: Normal appearance. She is well-developed.   Eyes:      General: No scleral icterus.  Cardiovascular:      Rate and Rhythm: Regular rhythm.      Heart sounds: Normal heart sounds. No murmur heard.  Pulmonary:      Effort: Pulmonary effort is normal. No accessory muscle usage.      Breath sounds: Normal breath sounds.   Abdominal:      General: Bowel sounds are normal. There is no distension.      Palpations: Abdomen is soft. There is no mass.      Tenderness: There is no abdominal tenderness. There is no guarding or rebound.   Skin:     General: Skin is warm and dry.      Coloration: Skin is not jaundiced.   Neurological:      Mental Status: She is alert.   Psychiatric:         Behavior: Behavior is cooperative.         Imaging Results (Most Recent)     None          Body mass index is 24.07 kg/m².    Assessment & Plan     Diagnoses and all orders for this visit:    1. Altered bowel function (Primary)  -     Case Request; Standing  -     Implement Anesthesia Orders Day of Procedure; Standing  -     Obtain Informed Consent; Standing  -     Case Request    2. Diarrhea, unspecified type  -     Tissue Transglutaminase, IgA; Future         COLONOSCOPY WITH ANESTHESIA (N/A)    Recommend daily use of Miralax, adjust as needed  Encouraged addition of dietary fiber, increasing daily water consumption, as well daily physical activity  To help regulate bowel movement     Miralax prep     May need EGD for biopsy if lab work is positive     Discussed if colonoscopy is normal, suspect IBS-C and importance of controlling  bowels    Advised pt to stop use of NSAIDs, Fish Oil, and MV 5 days prior to procedure, per Dr Isaac protocol.  Tylenol based products are ok to take.  Pt verbalized understanding.      All risks, benefits, alternatives, and indications of colonoscopy procedure have been discussed with the patient. Risks to include perforation of the colon requiring possible surgery or colostomy, risk of bleeding from biopsies or removal of colon tissue, possibility of missing a colon polyp or cancer, or adverse drug reaction.  Benefits to include the diagnosis and management of disease of the colon and rectum. Alternatives to include barium enema, radiographic evaluation, lab testing or no intervention. Pt verbalizes understanding and agrees to proceed with procedure.          Miguel Barnes, APRN  12/12/22          There are no Patient Instructions on file for this visit.

## 2022-12-13 DIAGNOSIS — E55.9 VITAMIN D DEFICIENCY: Primary | ICD-10-CM

## 2022-12-13 DIAGNOSIS — E53.8 VITAMIN B12 DEFICIENCY: ICD-10-CM

## 2022-12-13 PROBLEM — R19.8 ALTERED BOWEL FUNCTION: Status: ACTIVE | Noted: 2022-12-13

## 2022-12-13 RX ORDER — LANOLIN ALCOHOL/MO/W.PET/CERES
1000 CREAM (GRAM) TOPICAL DAILY
Qty: 30 TABLET | Refills: 2 | Status: SHIPPED | OUTPATIENT
Start: 2022-12-13 | End: 2022-12-15 | Stop reason: SINTOL

## 2022-12-13 RX ORDER — MELATONIN
1000 DAILY
Qty: 30 TABLET | Refills: 2 | Status: SHIPPED | OUTPATIENT
Start: 2022-12-13 | End: 2023-03-13

## 2022-12-14 ENCOUNTER — CLINICAL SUPPORT (OUTPATIENT)
Dept: FAMILY MEDICINE CLINIC | Facility: CLINIC | Age: 27
End: 2022-12-14

## 2022-12-14 DIAGNOSIS — R19.7 DIARRHEA, UNSPECIFIED TYPE: Primary | ICD-10-CM

## 2022-12-14 PROCEDURE — 36415 COLL VENOUS BLD VENIPUNCTURE: CPT | Performed by: NURSE PRACTITIONER

## 2022-12-14 NOTE — PROGRESS NOTES
Venipuncture Blood Specimen Collection  Venipuncture performed in [facility] by Aixa Souza LPN with good hemostasis. Patient tolerated the procedure well without complications.   12/14/22   Aixa Souza LPN

## 2022-12-15 LAB — TTG IGA SER-ACNC: <2 U/ML (ref 0–3)

## 2022-12-15 RX ORDER — CYANOCOBALAMIN 1000 UG/ML
INJECTION, SOLUTION INTRAMUSCULAR; SUBCUTANEOUS
Qty: 10 ML | Refills: 0 | Status: SHIPPED | OUTPATIENT
Start: 2022-12-15

## 2022-12-22 ENCOUNTER — TELEPHONE (OUTPATIENT)
Dept: FAMILY MEDICINE CLINIC | Facility: CLINIC | Age: 27
End: 2022-12-22

## 2022-12-22 DIAGNOSIS — F41.9 ANXIETY: Primary | ICD-10-CM

## 2022-12-22 RX ORDER — CLONAZEPAM 0.5 MG/1
0.5 TABLET ORAL 2 TIMES DAILY PRN
Qty: 60 TABLET | Refills: 0 | Status: SHIPPED | OUTPATIENT
Start: 2022-12-22 | End: 2023-01-18 | Stop reason: SDUPTHER

## 2022-12-22 NOTE — TELEPHONE ENCOUNTER
Pt's spouse came in and is requesting a prescription for patient for clonazepam 0.5 mg. He states she used to take it when they lived in [place]. Please advise.

## 2022-12-29 ENCOUNTER — ANESTHESIA (OUTPATIENT)
Dept: GASTROENTEROLOGY | Facility: HOSPITAL | Age: 27
End: 2022-12-29
Payer: OTHER GOVERNMENT

## 2022-12-29 ENCOUNTER — TELEPHONE (OUTPATIENT)
Dept: GASTROENTEROLOGY | Facility: CLINIC | Age: 27
End: 2022-12-29

## 2022-12-29 ENCOUNTER — ANESTHESIA EVENT (OUTPATIENT)
Dept: GASTROENTEROLOGY | Facility: HOSPITAL | Age: 27
End: 2022-12-29
Payer: OTHER GOVERNMENT

## 2022-12-29 ENCOUNTER — HOSPITAL ENCOUNTER (OUTPATIENT)
Facility: HOSPITAL | Age: 27
Setting detail: HOSPITAL OUTPATIENT SURGERY
Discharge: HOME | End: 2022-12-29
Attending: INTERNAL MEDICINE | Admitting: INTERNAL MEDICINE
Payer: OTHER GOVERNMENT

## 2022-12-29 VITALS
HEIGHT: 69 IN | RESPIRATION RATE: 22 BRPM | DIASTOLIC BLOOD PRESSURE: 44 MMHG | TEMPERATURE: 97.6 F | BODY MASS INDEX: 24.73 KG/M2 | SYSTOLIC BLOOD PRESSURE: 79 MMHG | HEART RATE: 83 BPM | WEIGHT: 167 LBS | OXYGEN SATURATION: 97 %

## 2022-12-29 DIAGNOSIS — R19.8 ALTERED BOWEL FUNCTION: ICD-10-CM

## 2022-12-29 PROCEDURE — 88305 TISSUE EXAM BY PATHOLOGIST: CPT | Performed by: INTERNAL MEDICINE

## 2022-12-29 PROCEDURE — 45380 COLONOSCOPY AND BIOPSY: CPT | Performed by: INTERNAL MEDICINE

## 2022-12-29 PROCEDURE — 45385 COLONOSCOPY W/LESION REMOVAL: CPT | Performed by: INTERNAL MEDICINE

## 2022-12-29 PROCEDURE — 25010000002 PROPOFOL 10 MG/ML EMULSION: Performed by: NURSE ANESTHETIST, CERTIFIED REGISTERED

## 2022-12-29 RX ORDER — LIDOCAINE HYDROCHLORIDE 20 MG/ML
INJECTION, SOLUTION EPIDURAL; INFILTRATION; INTRACAUDAL; PERINEURAL AS NEEDED
Status: DISCONTINUED | OUTPATIENT
Start: 2022-12-29 | End: 2022-12-29 | Stop reason: SURG

## 2022-12-29 RX ORDER — PROPOFOL 10 MG/ML
VIAL (ML) INTRAVENOUS AS NEEDED
Status: DISCONTINUED | OUTPATIENT
Start: 2022-12-29 | End: 2022-12-29 | Stop reason: SURG

## 2022-12-29 RX ORDER — SODIUM CHLORIDE 0.9 % (FLUSH) 0.9 %
10 SYRINGE (ML) INJECTION AS NEEDED
Status: DISCONTINUED | OUTPATIENT
Start: 2022-12-29 | End: 2022-12-29 | Stop reason: HOSPADM

## 2022-12-29 RX ORDER — SODIUM CHLORIDE 9 MG/ML
500 INJECTION, SOLUTION INTRAVENOUS CONTINUOUS PRN
Status: DISCONTINUED | OUTPATIENT
Start: 2022-12-29 | End: 2022-12-29 | Stop reason: HOSPADM

## 2022-12-29 RX ADMIN — SODIUM CHLORIDE 500 ML: 9 INJECTION, SOLUTION INTRAVENOUS at 08:18

## 2022-12-29 RX ADMIN — LIDOCAINE HYDROCHLORIDE 50 MG: 20 INJECTION, SOLUTION EPIDURAL; INFILTRATION; INTRACAUDAL; PERINEURAL at 09:33

## 2022-12-29 RX ADMIN — PROPOFOL 450 MG: 10 INJECTION, EMULSION INTRAVENOUS at 09:33

## 2022-12-29 NOTE — ANESTHESIA POSTPROCEDURE EVALUATION
"Patient: Rena Quiroz    Procedure Summary     Date: 12/29/22 Room / Location: [facility] ENDOSCOPY 2 / [facility] ENDOSCOPY    Anesthesia Start: 0927 Anesthesia Stop: 0947    Procedure: COLONOSCOPY WITH ANESTHESIA Diagnosis:       Altered bowel function      (Altered bowel function [R19.8])    Surgeons: Prashanth Isaac DO Provider: Pipo Moctezuma CRNA    Anesthesia Type: MAC ASA Status: 2          Anesthesia Type: MAC    Vitals  Vitals Value Taken Time   BP     Temp     Pulse 82 12/29/22 0947   Resp     SpO2     Vitals shown include unvalidated device data.        Post Anesthesia Care and Evaluation    Patient location during evaluation: PHASE II  Patient participation: complete - patient participated  Level of consciousness: awake and alert  Pain management: adequate    Airway patency: patent  Anesthetic complications: No anesthetic complications    Cardiovascular status: acceptable  Respiratory status: acceptable  Hydration status: acceptable    Comments: Blood pressure 126/71, pulse (!) 7, temperature 97.6 °F (36.4 °C), temperature source Temporal, resp. rate 18, height 175.3 cm (69\"), weight 75.8 kg (167 lb), SpO2 100 %.    Pt discharged from PACU based on angie score >8      "

## 2022-12-29 NOTE — ANESTHESIA PREPROCEDURE EVALUATION
Anesthesia Evaluation     Patient summary reviewed   no history of anesthetic complications:  NPO Solid Status: > 8 hours             Airway   Mallampati: I  TM distance: >3 FB  No difficulty expected  Dental - normal exam     Pulmonary - negative pulmonary ROS   Cardiovascular - negative cardio ROS  Exercise tolerance: excellent (>7 METS)        Neuro/Psych- negative ROS  GI/Hepatic/Renal/Endo - negative ROS     Musculoskeletal (-) negative ROS    Abdominal    Substance History      OB/GYN          Other                        Anesthesia Plan    ASA 2     MAC       Anesthetic plan, risks, benefits, and alternatives have been provided, discussed and informed consent has been obtained with: patient.        CODE STATUS:

## 2022-12-30 LAB
CYTO UR: NORMAL
LAB AP CASE REPORT: NORMAL
LAB AP CLINICAL INFORMATION: NORMAL
Lab: NORMAL
PATH REPORT.FINAL DX SPEC: NORMAL
PATH REPORT.GROSS SPEC: NORMAL

## 2023-01-10 ENCOUNTER — OFFICE VISIT (OUTPATIENT)
Dept: GASTROENTEROLOGY | Facility: CLINIC | Age: 28
End: 2023-01-10
Payer: OTHER GOVERNMENT

## 2023-01-10 VITALS
WEIGHT: 167 LBS | OXYGEN SATURATION: 98 % | SYSTOLIC BLOOD PRESSURE: 110 MMHG | HEIGHT: 69 IN | DIASTOLIC BLOOD PRESSURE: 70 MMHG | HEART RATE: 63 BPM | BODY MASS INDEX: 24.73 KG/M2

## 2023-01-10 DIAGNOSIS — R19.7 DIARRHEA, UNSPECIFIED TYPE: Primary | ICD-10-CM

## 2023-01-10 PROCEDURE — 99213 OFFICE O/P EST LOW 20 MIN: CPT | Performed by: INTERNAL MEDICINE

## 2023-01-10 RX ORDER — MONTELUKAST SODIUM 4 MG/1
1 TABLET, CHEWABLE ORAL 4 TIMES DAILY
Qty: 30 TABLET | Refills: 11 | Status: SHIPPED | OUTPATIENT
Start: 2023-01-10

## 2023-01-10 NOTE — PROGRESS NOTES
Chief Complaint   Patient presents with   • Diarrhea     Had colon is no better still has diarrhea all the time       PCP: Nicol Paula APRN  REFER: No ref. provider found    Subjective     HPI            Lower abdominal pain /colicky  Loose BM's up to 5x day      Past Medical History:   Diagnosis Date   • Anemia    • Anxiety    • Depression    • Headache    • Obesity        Past Surgical History:   Procedure Laterality Date   • BARIATRIC SURGERY     •  SECTION     • CHOLECYSTECTOMY     • COLONOSCOPY N/A 2022    Procedure: COLONOSCOPY WITH ANESTHESIA;  Surgeon: Prashanth Isaac DO;  Location: [facility] ENDOSCOPY;  Service: Gastroenterology;  Laterality: N/A;  pre op: alt bowel fxn   post op:polyps  PCP: Nicol Paula APRN   • COSMETIC SURGERY      tummy tuck   • HYSTERECTOMY     • TONSILLECTOMY         Outpatient Medications Marked as Taking for the 1/10/23 encounter (Office Visit) with Prashanth Isaac DO   Medication Sig Dispense Refill   • cholecalciferol (VITAMIN D3) 25 MCG (1000 UT) tablet Take 1 tablet by mouth Daily for 90 days. 30 tablet 2   • clonazePAM (KlonoPIN) 0.5 MG tablet Take 1 tablet by mouth 2 (Two) Times a Day As Needed for Anxiety. 60 tablet 0   • cyanocobalamin 1000 MCG/ML injection Injection 1,000 mcg (1mL) once a week for 8 weeks then once a month for 2 months. 10 mL 0       Allergies   Allergen Reactions   • Nsaids Other (See Comments)     Previous weight loss surgery       Social History     Socioeconomic History   • Marital status:    Tobacco Use   • Smoking status: Never   • Smokeless tobacco: Never   Vaping Use   • Vaping Use: Never used   Substance and Sexual Activity   • Alcohol use: Never   • Drug use: Never   • Sexual activity: Defer       Review of Systems   Constitutional: Negative for unexpected weight change.   Respiratory: Negative for shortness of breath.    Cardiovascular: Negative for chest pain.   Gastrointestinal: Negative for  abdominal pain and anal bleeding.       Objective     Vitals:    01/10/23 0945   BP: 110/70   Pulse: 63   SpO2: 98%   Weight: 75.8 kg (167 lb)   Height: 175.3 cm (69\")     Body mass index is 24.66 kg/m².    Physical Exam  Constitutional:       Appearance: Normal appearance. She is well-developed.   Eyes:      General: No scleral icterus.  Neck:      Thyroid: No thyroid mass or thyromegaly.      Vascular: No JVD.   Pulmonary:      Effort: Pulmonary effort is normal. No accessory muscle usage.   Abdominal:      General: There is no distension.      Tenderness: There is no abdominal tenderness. There is no guarding.   Skin:     Coloration: Skin is not jaundiced.   Neurological:      Mental Status: She is alert.   Psychiatric:         Behavior: Behavior is cooperative.         Judgment: Judgment normal.         Imaging Results (Most Recent)     None          Body mass index is 24.66 kg/m².    Assessment & Plan     Diagnoses and all orders for this visit:    1. Diarrhea, unspecified type (Primary)    Other orders  -     colestipol (COLESTID) 1 g tablet; Take 1 tablet by mouth 4 (Four) Times a Day. Take 1x/day (as directed)  Dispense: 30 tablet; Refill: 11    pt to  Call or send us a message back with her progress      * Surgery not found *        Advised pt to stop use of NSAIDs, Fish Oil, and MV 5 days prior to procedure, per Dr Isaac protocol.  Tylenol based products are ok to take.  Pt verbalized understanding.        Prashanth Isaac, DO  01/10/23          There are no Patient Instructions on file for this visit.

## 2023-01-18 ENCOUNTER — OFFICE VISIT (OUTPATIENT)
Dept: FAMILY MEDICINE CLINIC | Facility: CLINIC | Age: 28
End: 2023-01-18
Payer: OTHER GOVERNMENT

## 2023-01-18 VITALS
BODY MASS INDEX: 25.33 KG/M2 | DIASTOLIC BLOOD PRESSURE: 80 MMHG | HEIGHT: 69 IN | TEMPERATURE: 98 F | HEART RATE: 72 BPM | SYSTOLIC BLOOD PRESSURE: 125 MMHG | WEIGHT: 171 LBS | RESPIRATION RATE: 16 BRPM

## 2023-01-18 DIAGNOSIS — F41.9 ANXIETY: Primary | Chronic | ICD-10-CM

## 2023-01-18 DIAGNOSIS — Z79.899 CONTROLLED SUBSTANCE AGREEMENT SIGNED: ICD-10-CM

## 2023-01-18 DIAGNOSIS — F32.A DEPRESSION, UNSPECIFIED DEPRESSION TYPE: Chronic | ICD-10-CM

## 2023-01-18 PROCEDURE — 99214 OFFICE O/P EST MOD 30 MIN: CPT | Performed by: NURSE PRACTITIONER

## 2023-01-18 PROCEDURE — 80305 DRUG TEST PRSMV DIR OPT OBS: CPT | Performed by: NURSE PRACTITIONER

## 2023-01-18 RX ORDER — CLONAZEPAM 0.5 MG/1
0.5 TABLET ORAL 2 TIMES DAILY PRN
Qty: 60 TABLET | Refills: 0 | Status: SHIPPED | OUTPATIENT
Start: 2023-01-18 | End: 2023-02-01 | Stop reason: SDUPTHER

## 2023-01-24 ENCOUNTER — OFFICE VISIT (OUTPATIENT)
Dept: OBSTETRICS AND GYNECOLOGY | Facility: CLINIC | Age: 28
End: 2023-01-24
Payer: OTHER GOVERNMENT

## 2023-01-24 VITALS
DIASTOLIC BLOOD PRESSURE: 78 MMHG | BODY MASS INDEX: 24.47 KG/M2 | HEIGHT: 69 IN | WEIGHT: 165.2 LBS | SYSTOLIC BLOOD PRESSURE: 116 MMHG

## 2023-01-24 DIAGNOSIS — R10.2 PELVIC PAIN: ICD-10-CM

## 2023-01-24 DIAGNOSIS — Z87.42 H/O ABNORMAL CERVICAL PAPANICOLAOU SMEAR: ICD-10-CM

## 2023-01-24 DIAGNOSIS — Z01.419 ENCOUNTER FOR ANNUAL ROUTINE GYNECOLOGICAL EXAMINATION: Primary | ICD-10-CM

## 2023-01-24 DIAGNOSIS — N94.10 FEMALE DYSPAREUNIA: ICD-10-CM

## 2023-01-24 DIAGNOSIS — Z90.710 H/O VAGINAL HYSTERECTOMY: ICD-10-CM

## 2023-01-24 DIAGNOSIS — Z81.8 FAMILY HISTORY OF AUTISM: ICD-10-CM

## 2023-01-24 DIAGNOSIS — N95.2 VAGINAL ATROPHY: ICD-10-CM

## 2023-01-24 DIAGNOSIS — F52.0 HYPOACTIVE SEXUAL DESIRE: ICD-10-CM

## 2023-01-24 DIAGNOSIS — R23.2 VASOMOTOR FLUSHING: ICD-10-CM

## 2023-01-24 LAB
C TRACH RRNA CVX QL NAA+PROBE: NOT DETECTED
GEN CATEG CVX/VAG CYTO-IMP: NORMAL
LAB AP CASE REPORT: NORMAL
LAB AP GYN ADDITIONAL INFORMATION: NORMAL
Lab: NORMAL
N GONORRHOEA RRNA SPEC QL NAA+PROBE: NOT DETECTED
PATH INTERP SPEC-IMP: NORMAL
STAT OF ADQ CVX/VAG CYTO-IMP: NORMAL
TRICHOMONAS VAGINALIS PCR: NOT DETECTED

## 2023-01-24 PROCEDURE — 87661 TRICHOMONAS VAGINALIS AMPLIF: CPT | Performed by: OBSTETRICS & GYNECOLOGY

## 2023-01-24 PROCEDURE — 87491 CHLMYD TRACH DNA AMP PROBE: CPT | Performed by: OBSTETRICS & GYNECOLOGY

## 2023-01-24 PROCEDURE — 87529 HSV DNA AMP PROBE: CPT | Performed by: OBSTETRICS & GYNECOLOGY

## 2023-01-24 PROCEDURE — 99385 PREV VISIT NEW AGE 18-39: CPT | Performed by: OBSTETRICS & GYNECOLOGY

## 2023-01-24 PROCEDURE — 87591 N.GONORRHOEAE DNA AMP PROB: CPT | Performed by: OBSTETRICS & GYNECOLOGY

## 2023-01-24 PROCEDURE — G0123 SCREEN CERV/VAG THIN LAYER: HCPCS | Performed by: OBSTETRICS & GYNECOLOGY

## 2023-01-24 RX ORDER — CONJUGATED ESTROGENS 0.62 MG/G
CREAM VAGINAL DAILY
Qty: 30 G | Refills: 1 | Status: SHIPPED | OUTPATIENT
Start: 2023-01-24

## 2023-01-24 NOTE — PROGRESS NOTES
Reid Young MD  [facility]  [address]  [place] [address]  Office [phone]  Fax [phone]       Krysten Quiroz  : 1995  MRN: [id number]    Subjective   Subjective     Chief Complaint   Patient presents with   • Gynecologic Exam     Patient just moved here in May. Patient here with complaints of vaginal dryness, painful intercourse after hysterectomy. Low libido also a concern. Patient had mammogram 2022 due to bilat breast pain- has a lot of right breast pain. Not cycle related, constant. Scans showing a mass, no relief with pain medications. Also, has hot flashes, night sweats, fatigue, no motivation. Has pelvic floor therapy ordered by PCP but no appt set yet. Hysterectomy done for heavy bleeding in 2022.       History of Present Illness  Rena Quiroz is a 27 y.o. female , , who comes to the office today establish care.  Patient with multiple issues.  Patient with vaginal dryness as well as painful intercourse and low libido.  Patient states this all started back following her hysterectomy in .  She had a hysterectomy vaginally for her abnormal uterine bleeding and menorrhagia.  Since that time, her symptoms of flushing as well as dryness and painful intercourse has been worsening.  She has not had any vaginal bleeding.  She is having dyspareunia with insertion as well as deep penetration.  She has been followed by general surgery for breast pain as well as possible mass on the right.  She has a significant family history of breast cancer.  She tested negative for BRCA.  For history significant for anxiety and depression which as she has been on Zoloft before.  She has been off this medication for a year but just recently is planning to restart this.    Review of Systems   Genitourinary: Positive for dyspareunia, menstrual problem and pelvic pain. Negative for decreased urine volume, difficulty urinating, dysuria, enuresis, flank pain, frequency,  genital sores, hematuria, urgency, vaginal bleeding, vaginal discharge and vaginal pain.   All other systems reviewed and are negative.       OB hx:  OB History    Para Term  AB Living   1 1 1     2   SAB IAB Ectopic Molar Multiple Live Births             2      # Outcome Date GA Lbr Mark/2nd Weight Sex Delivery Anes PTL Lv   1 Term 2020 38w0d    CS-LTranv   ZENAIDA        GYN hx:  Date of LMP: No LMP recorded. Patient has had a hysterectomy.  Age at menarche: 9 yo    Menopause: symptoms at  following hysterectomy  Menses: irregular  Flow: heavy  Date/Result of last Pap sme2ar: she reports her last PAP was normal   History of abnormal PAP: Yes. Details: h/o colposcopy, denies h/o LEEP  Date/Result of last mammogram: MRI/US/Mammogram   History of Abnormal Mammogram: Yes. Details: followed by surgery  Date/Result of last colonoscopy: Repeat colonoscopy recommended in 10 years  History of Abnormal colonoscopy: No  Date/Result of last DEXA: None  History of Abnormal DEXA: No  HPV Vaccination: No, possibly had 2 doses  History of Sexually Transmitted Infection: Yes. Details: h/o chlamydia  Current Birth Control Method: hysterectomy  History of Contraception: Yes. Details: OCPs prior to hysterectomy  HRT: No  Sexually active: Yes. Details: male partner   FMH of Breast, Uterine, Ovarian, or Colon cancer: Yes. Details:    -breast cancer in maternal and paternal side  Additional OB/GYN History (not otherwise listed):  -vaginal hysterectomy secondary to menorrhagia    Personal History     The following portions of the patient's history were reviewed and updated as appropriate: allergies, current medications, past family history, past medical history, past social history, past surgical history and problem list.    History Review Reviewed Comments   Past Medical History:  [x]     Past Surgical History: [x]     Family History: [x]     Social History: [x]       Current Outpatient Medications   Medication  "Instructions   • cholecalciferol (VITAMIN D3) 1,000 Units, Oral, Daily   • clonazePAM (KLONOPIN) 0.5 mg, Oral, 2 Times Daily PRN   • colestipol (COLESTID) 1 g, Oral, 4 Times Daily, Take 1x/day (as directed)   • cyanocobalamin 1000 MCG/ML injection Injection 1,000 mcg (1mL) once a week for 8 weeks then once a month for 2 months.   • Estrogens Conjugated (Premarin) 0.625 MG/GM vaginal cream Vaginal, Daily, Insert pea-sized amount nightly into the vagina for 2 weeks then insert twice a week   • sertraline (ZOLOFT) 50 mg, Oral, Daily   • Syringe/Needle, Disp, (Luer Lock Safety Syringes) 25G X 1\" 3 ML misc 1 dose, Intramuscular, Weekly       Allergies   Allergen Reactions   • Nsaids Other (See Comments)     Previous weight loss surgery       Objective    Objective     Vitals:   Visit Vitals  /78   Ht 175.3 cm (69\")   Wt 74.9 kg (165 lb 3.2 oz)   BMI 24.40 kg/m²        Physical Exam  Vitals and nursing note reviewed. Exam conducted with a chaperone present (Consent for exam obtained verbally from patient.).   Constitutional:       General: She is not in acute distress.     Appearance: Normal appearance. She is well-developed.   HENT:      Head: Normocephalic and atraumatic.   Eyes:      General: No scleral icterus.     Conjunctiva/sclera: Conjunctivae normal.   Pulmonary:      Effort: Pulmonary effort is normal. No respiratory distress.   Chest:   Breasts:     Breasts are symmetrical.      Right: Mass and tenderness present. No swelling, bleeding, inverted nipple, nipple discharge or skin change.      Left: Normal.       Abdominal:      General: There is no distension.      Palpations: Abdomen is soft. There is no mass.      Tenderness: There is no abdominal tenderness.   Genitourinary:     General: Normal vulva.      Exam position: Lithotomy position.      Pubic Area: No pubic lice.       Labia:         Right: No tenderness or lesion.         Left: No tenderness or lesion.       Urethra: No prolapse.      Vagina: " No foreign body. Tenderness (Generalized tenderness to palpation on bimanual exam) present. No vaginal discharge, bleeding, lesions or prolapsed vaginal walls.      Uterus: Absent.       Adnexa:         Right: No mass, tenderness or fullness.          Left: No mass, tenderness or fullness.        Rectum: No external hemorrhoid.         Musculoskeletal:      Right lower leg: No edema.      Left lower leg: No edema.   Lymphadenopathy:      Upper Body:      Right upper body: No supraclavicular, axillary or pectoral adenopathy.      Left upper body: No supraclavicular, axillary or pectoral adenopathy.   Skin:     General: Skin is warm and dry.      Coloration: Skin is not cyanotic, jaundiced or pale.   Neurological:      General: No focal deficit present.      Mental Status: She is alert and oriented to person, place, and time.   Psychiatric:         Mood and Affect: Mood normal.         Behavior: Behavior is cooperative.         Result Review    Comprehensive metabolic panel (12/09/2022 08:16)  TSH+Free T4 (12/05/2022 10:09)  Genetic Testing - Blood, (11/14/2022 00:00)          Assessment & Plan   Assessment / Plan     Diagnoses and all orders for this visit:    1. Encounter for annual routine gynecological examination (Primary)    2. Vasomotor flushing  -     Follicle Stimulating Hormone  -     Estradiol  -     Luteinizing Hormone  -     Progesterone  -     Testosterone    3. Hypoactive sexual desire    4. Family history of autism  -     Fragile X Syndrome, PCR With Reflex to Southern Blot    5. Vaginal atrophy  -     Estrogens Conjugated (Premarin) 0.625 MG/GM vaginal cream; Insert  into the vagina Daily. Insert pea-sized amount nightly into the vagina for 2 weeks then insert twice a week  Dispense: 30 g; Refill: 1    6. Female dyspareunia    7. H/O vaginal hysterectomy    8. H/O abnormal cervical Papanicolaou smear  -     Liquid-based Pap Smear, Screening        Discussion:   BMI Body mass index is 24.4 kg/m²..    Colonoscopy: Up to date  Mammogram: pending March 2023  DEXA:  at age 65 unless indicated prior  Pap smear, per ASCCP guidelines: Done today  STI screening: declines  Contraception: n/a  HPV vaccine: unsure, possibly has received doses already    Encouraged self breast awareness.  Encouraged proactive weight management and importance of maintaining a healthy weight.   Encouraged regular exercise and the importance of same, in regards to a healthy heart as well as helping to maintain her weight and improving her mental health.      Reviewed with patient her history as well as clinical exam findings.  Her history is significant for menopausal symptoms as well as a family history of autism in her son.  Her family history is significant for breast cancer.  Given the symptoms of menopause at this early age, recommended Fragile X testing.  We will check her hormone labs today as well.  She has follow-up with her right breast with Dr. Chavira coming up.  She has a mammogram pending for that.  We will go ahead and start her on Premarin cream for her vaginal atrophy.  If her labs do show menopausal status, will need to consider DEXA scan for her bone health as well is considering starting some hormone replacement therapy besides vaginal estrogen.  We will have her return in about 3 months to follow-up on her Premarin use as well as discuss anything further.  Her questions were answered.    Follow-up: Return in about 3 months (around 4/24/2023) for GYN f/u, GYN US.    Reid Young MD

## 2023-01-24 NOTE — LETTER
2023     MEGHANA Ayers  [address] [facility] [address]    Patient: Rena Quiroz   YOB: 1995   Date of Visit: 2023     Dear MEGHANA Morrison:    Thank you for referring Rena Quiroz to me for evaluation. Below are the relevant portions of my assessment and plan of care.    If you have questions, please do not hesitate to call me. I look forward to following Rena along with you.         Sincerely,        Reid Young MD        CC: No Recipients    Progress Notes:      Reid Young MD  [facility]  [address]  [place] [address]  Office [phone]  Fax [phone]       Krysten Quiroz  : 1995  MRN: [id number]    Subjective    Subjective     Chief Complaint   Patient presents with   • Gynecologic Exam     Patient just moved here in May. Patient here with complaints of vaginal dryness, painful intercourse after hysterectomy. Low libido also a concern. Patient had mammogram 2022 due to bilat breast pain- has a lot of right breast pain. Not cycle related, constant. Scans showing a mass, no relief with pain medications. Also, has hot flashes, night sweats, fatigue, no motivation. Has pelvic floor therapy ordered by PCP but no appt set yet. Hysterectomy done for heavy bleeding in 2022.       History of Present Illness  Rena Quiroz is a 27 y.o. female , , who comes to the office today establish care.  Patient with multiple issues.  Patient with vaginal dryness as well as painful intercourse and low libido.  Patient states this all started back following her hysterectomy in .  She had a hysterectomy vaginally for her abnormal uterine bleeding and menorrhagia.  Since that time, her symptoms of flushing as well as dryness and painful intercourse has been worsening.  She has not had any vaginal bleeding.  She is having dyspareunia with insertion as well as deep penetration.  She has been followed by general surgery for breast pain  as well as possible mass on the right.  She has a significant family history of breast cancer.  She tested negative for BRCA.  For history significant for anxiety and depression which as she has been on Zoloft before.  She has been off this medication for a year but just recently is planning to restart this.    Review of Systems   Genitourinary: Positive for dyspareunia, menstrual problem and pelvic pain. Negative for decreased urine volume, difficulty urinating, dysuria, enuresis, flank pain, frequency, genital sores, hematuria, urgency, vaginal bleeding, vaginal discharge and vaginal pain.   All other systems reviewed and are negative.       OB hx:  OB History    Para Term  AB Living   1 1 1     2   SAB IAB Ectopic Molar Multiple Live Births             2      # Outcome Date GA Lbr Mark/2nd Weight Sex Delivery Anes PTL Lv   1 Term 2020 38w0d    CS-LTranv   ZENAIDA        GYN hx:  Date of LMP: No LMP recorded. Patient has had a hysterectomy.  Age at menarche: 11 yo    Menopause: symptoms at  following hysterectomy  Menses: irregular  Flow: heavy  Date/Result of last Pap sme2ar: she reports her last PAP was normal   History of abnormal PAP: Yes. Details: h/o colposcopy, denies h/o LEEP  Date/Result of last mammogram: MRI/US/Mammogram   History of Abnormal Mammogram: Yes. Details: followed by surgery  Date/Result of last colonoscopy: Repeat colonoscopy recommended in 10 years  History of Abnormal colonoscopy: No  Date/Result of last DEXA: None  History of Abnormal DEXA: No  HPV Vaccination: No, possibly had 2 doses  History of Sexually Transmitted Infection: Yes. Details: h/o chlamydia  Current Birth Control Method: hysterectomy  History of Contraception: Yes. Details: OCPs prior to hysterectomy  HRT: No  Sexually active: Yes. Details: male partner   FMH of Breast, Uterine, Ovarian, or Colon cancer: Yes. Details:    -breast cancer in maternal and paternal side  Additional OB/GYN History (not  "otherwise listed):  -vaginal hysterectomy secondary to menorrhagia    Personal History     The following portions of the patient's history were reviewed and updated as appropriate: allergies, current medications, past family history, past medical history, past social history, past surgical history and problem list.    History Review Reviewed Comments   Past Medical History:  [x]     Past Surgical History: [x]     Family History: [x]     Social History: [x]       Current Outpatient Medications   Medication Instructions   • cholecalciferol (VITAMIN D3) 1,000 Units, Oral, Daily   • clonazePAM (KLONOPIN) 0.5 mg, Oral, 2 Times Daily PRN   • colestipol (COLESTID) 1 g, Oral, 4 Times Daily, Take 1x/day (as directed)   • cyanocobalamin 1000 MCG/ML injection Injection 1,000 mcg (1mL) once a week for 8 weeks then once a month for 2 months.   • Estrogens Conjugated (Premarin) 0.625 MG/GM vaginal cream Vaginal, Daily, Insert pea-sized amount nightly into the vagina for 2 weeks then insert twice a week   • sertraline (ZOLOFT) 50 mg, Oral, Daily   • Syringe/Needle, Disp, (Luer Lock Safety Syringes) 25G X 1\" 3 ML misc 1 dose, Intramuscular, Weekly       Allergies   Allergen Reactions   • Nsaids Other (See Comments)     Previous weight loss surgery       Objective     Objective     Vitals:   Visit Vitals  /78   Ht 175.3 cm (69\")   Wt 74.9 kg (165 lb 3.2 oz)   BMI 24.40 kg/m²        Physical Exam  Vitals and nursing note reviewed. Exam conducted with a chaperone present (Consent for exam obtained verbally from patient.).   Constitutional:       General: She is not in acute distress.     Appearance: Normal appearance. She is well-developed.   HENT:      Head: Normocephalic and atraumatic.   Eyes:      General: No scleral icterus.     Conjunctiva/sclera: Conjunctivae normal.   Pulmonary:      Effort: Pulmonary effort is normal. No respiratory distress.   Chest:   Breasts:     Breasts are symmetrical.      Right: Mass and " tenderness present. No swelling, bleeding, inverted nipple, nipple discharge or skin change.      Left: Normal.       Abdominal:      General: There is no distension.      Palpations: Abdomen is soft. There is no mass.      Tenderness: There is no abdominal tenderness.   Genitourinary:     General: Normal vulva.      Exam position: Lithotomy position.      Pubic Area: No pubic lice.       Labia:         Right: No tenderness or lesion.         Left: No tenderness or lesion.       Urethra: No prolapse.      Vagina: No foreign body. Tenderness (Generalized tenderness to palpation on bimanual exam) present. No vaginal discharge, bleeding, lesions or prolapsed vaginal walls.      Uterus: Absent.       Adnexa:         Right: No mass, tenderness or fullness.          Left: No mass, tenderness or fullness.        Rectum: No external hemorrhoid.         Musculoskeletal:      Right lower leg: No edema.      Left lower leg: No edema.   Lymphadenopathy:      Upper Body:      Right upper body: No supraclavicular, axillary or pectoral adenopathy.      Left upper body: No supraclavicular, axillary or pectoral adenopathy.   Skin:     General: Skin is warm and dry.      Coloration: Skin is not cyanotic, jaundiced or pale.   Neurological:      General: No focal deficit present.      Mental Status: She is alert and oriented to person, place, and time.   Psychiatric:         Mood and Affect: Mood normal.         Behavior: Behavior is cooperative.         Result Review    Comprehensive metabolic panel (12/09/2022 08:16)  TSH+Free T4 (12/05/2022 10:09)  Genetic Testing - Blood, (11/14/2022 00:00)         Assessment & Plan   Assessment / Plan     Diagnoses and all orders for this visit:    1. Encounter for annual routine gynecological examination (Primary)    2. Vasomotor flushing  -     Follicle Stimulating Hormone  -     Estradiol  -     Luteinizing Hormone  -     Progesterone  -     Testosterone    3. Hypoactive sexual desire    4.  Family history of autism  -     Fragile X Syndrome, PCR With Reflex to Southern Blot    5. Vaginal atrophy  -     Estrogens Conjugated (Premarin) 0.625 MG/GM vaginal cream; Insert  into the vagina Daily. Insert pea-sized amount nightly into the vagina for 2 weeks then insert twice a week  Dispense: 30 g; Refill: 1    6. Female dyspareunia    7. H/O vaginal hysterectomy    8. H/O abnormal cervical Papanicolaou smear  -     Liquid-based Pap Smear, Screening        Discussion:   BMI Body mass index is 24.4 kg/m²..   Colonoscopy: Up to date  Mammogram: pending March 2023  DEXA:  at age 65 unless indicated prior  Pap smear, per ASCCP guidelines: Done today  STI screening: declines  Contraception: n/a  HPV vaccine: unsure, possibly has received doses already    Encouraged self breast awareness.  Encouraged proactive weight management and importance of maintaining a healthy weight.   Encouraged regular exercise and the importance of same, in regards to a healthy heart as well as helping to maintain her weight and improving her mental health.      Reviewed with patient her history as well as clinical exam findings.  Her history is significant for menopausal symptoms as well as a family history of autism in her son.  Her family history is significant for breast cancer.  Given the symptoms of menopause at this early age, recommended Fragile X testing.  We will check her hormone labs today as well.  She has follow-up with her right breast with Dr. Chavira coming up.  She has a mammogram pending for that.  We will go ahead and start her on Premarin cream for her vaginal atrophy.  If her labs do show menopausal status, will need to consider DEXA scan for her bone health as well is considering starting some hormone replacement therapy besides vaginal estrogen.  We will have her return in about 3 months to follow-up on her Premarin use as well as discuss anything further.  Her questions were answered.    Follow-up: Return in  about 3 months (around 4/24/2023) for GYN f/u, GYN US.    Reid Young MD     Sarecycline Counseling: Patient advised regarding possible photosensitivity and discoloration of the teeth, skin, lips, tongue and gums.  Patient instructed to avoid sunlight, if possible.  When exposed to sunlight, patients should wear protective clothing, sunglasses, and sunscreen.  The patient was instructed to call the office immediately if the following severe adverse effects occur:  hearing changes, easy bruising/bleeding, severe headache, or vision changes.  The patient verbalized understanding of the proper use and possible adverse effects of sarecycline.  All of the patient's questions and concerns were addressed.

## 2023-01-25 LAB
ESTRADIOL SERPL-MCNC: 41.7 PG/ML
FSH SERPL-ACNC: 6.4 MIU/ML
HSV1 DNA SPEC QL NAA+PROBE: NOT DETECTED
HSV2 DNA SPEC QL NAA+PROBE: NOT DETECTED
LH SERPL-ACNC: 8.1 MIU/ML
PROGEST SERPL-MCNC: 0.1 NG/ML
TESTOST SERPL-MCNC: 32 NG/DL (ref 13–71)

## 2023-01-27 ENCOUNTER — PATIENT ROUNDING (BHMG ONLY) (OUTPATIENT)
Dept: OBSTETRICS AND GYNECOLOGY | Facility: CLINIC | Age: 28
End: 2023-01-27
Payer: OTHER GOVERNMENT

## 2023-01-27 NOTE — PROGRESS NOTES
January 27, 2023    Hello, may I speak with Rena Quiroz?    My name is Sommer    I am  with [facility]  [address], [address]  [place] [id number] [phone].    Before we get started may I verify your date of birth? 1995    I am calling to officially welcome you to our practice and ask about your recent visit. Is this a good time to talk? Yes    Tell me about your visit with us. What things went well?  It was well       We're always looking for ways to make our patients' experiences even better. Do you have recommendations on ways we may improve?  no    Overall were you satisfied with your first visit to our practice? yes       I appreciate you taking the time to speak with me today. Is there anything else I can do for you? no      Thank you, and have a great day.

## 2023-01-31 LAB
FMR1 GENE CGG RPT BLD/T QL: NORMAL
LABORATORY COMMENT REPORT: NORMAL

## 2023-02-01 ENCOUNTER — PATIENT MESSAGE (OUTPATIENT)
Dept: FAMILY MEDICINE CLINIC | Facility: CLINIC | Age: 28
End: 2023-02-01
Payer: OTHER GOVERNMENT

## 2023-02-01 DIAGNOSIS — F41.9 ANXIETY: Chronic | ICD-10-CM

## 2023-02-01 DIAGNOSIS — F32.A DEPRESSION, UNSPECIFIED DEPRESSION TYPE: Chronic | ICD-10-CM

## 2023-02-01 RX ORDER — CLONAZEPAM 0.5 MG/1
0.5 TABLET ORAL 2 TIMES DAILY PRN
Qty: 60 TABLET | Refills: 0 | Status: SHIPPED | OUTPATIENT
Start: 2023-02-01

## 2023-02-02 ENCOUNTER — PATIENT MESSAGE (OUTPATIENT)
Dept: FAMILY MEDICINE CLINIC | Facility: CLINIC | Age: 28
End: 2023-02-02
Payer: OTHER GOVERNMENT

## 2023-02-02 DIAGNOSIS — L24.3 IRRITANT CONTACT DERMATITIS DUE TO COSMETICS: Primary | ICD-10-CM

## 2023-02-02 LAB
AMPHET+METHAMPHET UR QL: NEGATIVE
AMPHETAMINE INTERNAL CONTROL: NORMAL
AMPHETAMINES UR QL: NEGATIVE
BARBITURATE INTERNAL CONTROL: NORMAL
BARBITURATES UR QL SCN: NEGATIVE
BENZODIAZ UR QL SCN: NEGATIVE
BENZODIAZEPINE INTERNAL CONTROL: NORMAL
BUPRENORPHINE INTERNAL CONTROL: NORMAL
BUPRENORPHINE SERPL-MCNC: NEGATIVE NG/ML
CANNABINOIDS SERPL QL: NEGATIVE
COCAINE INTERNAL CONTROL: NORMAL
COCAINE UR QL: NEGATIVE
EDDP INTERNAL CONTROL: NORMAL
EDDP UR QL SCN: NEGATIVE
MDMA (ECSTASY) INTERNAL CONTROL: NORMAL
MDMA UR QL SCN: NEGATIVE
METHADONE INTERNAL CONTROL: NORMAL
METHADONE UR QL SCN: NEGATIVE
METHAMPHETAMINE INTERNAL CONTROL: NORMAL
MORPHINE INTERNAL CONTROL: NORMAL
MORPHINE UR QL SCN: NEGATIVE
OXYCODONE INTERNAL CONTROL: NORMAL
OXYCODONE UR QL SCN: NEGATIVE
PCP UR QL SCN: NEGATIVE
PHENCYCLIDINE INTERNAL CONTROL: NORMAL
PROPOXYPH UR QL SCN: NEGATIVE
PROPOXYPHENE INTERNAL CONTROL: NORMAL
THC INTERNAL CONTROL: NORMAL
TRICYCLIC ANTIDEPRESSANTS INTERNAL CONTROL: NORMAL
TRICYCLICS UR QL SCN: NEGATIVE

## 2023-02-02 RX ORDER — METHYLPREDNISOLONE 4 MG/1
TABLET ORAL
Qty: 21 TABLET | Refills: 0 | Status: SHIPPED | OUTPATIENT
Start: 2023-02-02

## 2023-02-15 ENCOUNTER — OFFICE VISIT (OUTPATIENT)
Dept: FAMILY MEDICINE CLINIC | Facility: CLINIC | Age: 28
End: 2023-02-15
Payer: OTHER GOVERNMENT

## 2023-02-15 VITALS
HEIGHT: 69 IN | BODY MASS INDEX: 24.73 KG/M2 | OXYGEN SATURATION: 97 % | RESPIRATION RATE: 16 BRPM | DIASTOLIC BLOOD PRESSURE: 61 MMHG | SYSTOLIC BLOOD PRESSURE: 106 MMHG | WEIGHT: 167 LBS | HEART RATE: 79 BPM | TEMPERATURE: 98.4 F

## 2023-02-15 DIAGNOSIS — F41.9 ANXIETY: ICD-10-CM

## 2023-02-15 DIAGNOSIS — F32.A DEPRESSION, UNSPECIFIED DEPRESSION TYPE: Primary | ICD-10-CM

## 2023-02-15 PROCEDURE — 99213 OFFICE O/P EST LOW 20 MIN: CPT | Performed by: NURSE PRACTITIONER

## 2023-02-15 NOTE — PROGRESS NOTES
Subjective     Chief Complaint   Patient presents with   • Follow-up     Depression.        History of Present Illness    Patient presents today for follow up on depression.  Patient states zoloft is not helping much and that patient feels overwhelmed by a new job promotion, kids, and transitioning to civilian life.     Patient's PMR from outside medical facility reviewed and noted.    Review of Systems   Constitutional: Positive for fatigue.   Psychiatric/Behavioral: Negative for confusion, decreased concentration, self-injury, sleep disturbance and suicidal ideas. The patient is nervous/anxious.         Otherwise complete ROS reviewed and negative except as mentioned in the HPI.    Past Medical History:   Past Medical History:   Diagnosis Date   • Abnormal Pap smear of cervix    • Anemia    • Anxiety    • Depression    • Gestational diabetes    • Headache    • Obesity    • Polycystic ovary syndrome      Past Surgical History:  Past Surgical History:   Procedure Laterality Date   • BARIATRIC SURGERY     •  SECTION     • CHOLECYSTECTOMY     • COLONOSCOPY N/A 2022    Procedure: COLONOSCOPY WITH ANESTHESIA;  Surgeon: Prashanth Isaac DO;  Location: [facility] ENDOSCOPY;  Service: Gastroenterology;  Laterality: N/A;  pre op: alt bowel fxn   post op:polyps  PCP: Nicol Paula APRN   • COSMETIC SURGERY      tummy tuck   • HYSTERECTOMY     • TONSILLECTOMY     • VAGINAL HYSTERECTOMY  2021    Lap/vag assist due to menorrhagia     Social History:  reports that she has never smoked. She has never used smokeless tobacco. She reports that she does not drink alcohol and does not use drugs.    Family History: family history includes Autism in her son; Breast cancer in her maternal grandmother and paternal grandmother; Breast cancer (age of onset: 41) in her mother; Cancer in her mother; Colon cancer in her paternal grandfather; Heart disease in her father; Hyperlipidemia in her  "father; Hypertension in her father.      Allergies:  Allergies   Allergen Reactions   • Nsaids Other (See Comments)     Previous weight loss surgery     Medications:  Prior to Admission medications    Medication Sig Start Date End Date Taking? Authorizing Provider   cholecalciferol (VITAMIN D3) 25 MCG (1000 UT) tablet Take 1 tablet by mouth Daily for 90 days. 12/13/22 3/13/23 Yes Nicol Paula APRN   clonazePAM (KlonoPIN) 0.5 MG tablet Take 1 tablet by mouth 2 (Two) Times a Day As Needed for Anxiety. 2/1/23  Yes Nicol Paula APRN   colestipol (COLESTID) 1 g tablet Take 1 tablet by mouth 4 (Four) Times a Day. Take 1x/day (as directed) 1/10/23  Yes Prashanth Isaac, DO   cyanocobalamin 1000 MCG/ML injection Injection 1,000 mcg (1mL) once a week for 8 weeks then once a month for 2 months. 12/15/22  Yes Nicol Paula APRN   Estrogens Conjugated (Premarin) 0.625 MG/GM vaginal cream Insert  into the vagina Daily. Insert pea-sized amount nightly into the vagina for 2 weeks then insert twice a week 1/24/23  Yes Reid Young MD   sertraline (Zoloft) 50 MG tablet Take 1 tablet by mouth Daily for 30 days. 2/1/23 3/3/23 Yes Nicol Paula APRN   Syringe/Needle, Disp, (Luer Lock Safety Syringes) 25G X 1\" 3 ML misc Inject 1 dose into the appropriate muscle as directed by prescriber 1 (One) Time Per Week for 10 doses. 12/15/22 2/17/23 Yes Nicol Paula APRN   methylPREDNISolone (MEDROL) 4 MG dose pack Take as directed on package instructions. 2/2/23   Nicol Paula APRN       PHQ-9 Depression Screening  Little interest or pleasure in doing things? 3-->nearly every day   Feeling down, depressed, or hopeless? 1-->several days   Trouble falling or staying asleep, or sleeping too much? 0-->not at all   Feeling tired or having little energy? 1-->several days   Poor appetite or overeating? 0-->not at all   Feeling bad about yourself - or that you are a failure " "or have let yourself or your family down? 1-->several days   Trouble concentrating on things, such as reading the newspaper or watching television? 1-->several days   Moving or speaking so slowly that other people could have noticed? Or the opposite - being so fidgety or restless that you have been moving around a lot more than usual? 0-->not at all   Thoughts that you would be better off dead, or of hurting yourself in some way? 0-->not at all   PHQ-9 Total Score 7   If you checked off any problems, how difficult have these problems made it for you to do your work, take care of things at home, or get along with other people? very difficult       PHQ-9 Total Score: 7   5-9 (Mild Depression)  Support given, observe for worsening symptoms and Discussed antidepressant therapy    Objective     Vital Signs: /61 (BP Location: Left arm, Patient Position: Sitting, Cuff Size: Adult)   Pulse 79   Temp 98.4 °F (36.9 °C) (Infrared)   Resp 16   Ht 175.3 cm (69\")   Wt 75.8 kg (167 lb)   SpO2 97%   BMI 24.66 kg/m²   Physical Exam  Vitals and nursing note reviewed.   Constitutional:       Appearance: Normal appearance.   HENT:      Head: Normocephalic.      Nose: Nose normal.      Mouth/Throat:      Mouth: Mucous membranes are moist.   Eyes:      Extraocular Movements: Extraocular movements intact.      Pupils: Pupils are equal, round, and reactive to light.   Pulmonary:      Effort: Pulmonary effort is normal.   Musculoskeletal:         General: Normal range of motion.      Cervical back: Normal range of motion.   Skin:     General: Skin is warm and dry.   Neurological:      General: No focal deficit present.      Mental Status: She is alert and oriented to person, place, and time.   Psychiatric:         Mood and Affect: Mood normal.         Behavior: Behavior normal.         Thought Content: Thought content normal.         Judgment: Judgment normal.         BMI is within normal parameters. No other follow-up for BMI " required.        Results Reviewed:  Glucose   Date Value Ref Range Status   12/09/2022 78 70 - 99 mg/dL Final     BUN   Date Value Ref Range Status   12/09/2022 10 6 - 20 mg/dL Final     Creatinine   Date Value Ref Range Status   12/09/2022 0.68 0.57 - 1.00 mg/dL Final   11/11/2022 0.70 0.60 - 1.30 mg/dL Final     Comment:     Serial Number: [id number]:  [id number]     Sodium   Date Value Ref Range Status   12/09/2022 142 134 - 144 mmol/L Final     Potassium   Date Value Ref Range Status   12/09/2022 4.4 3.5 - 5.2 mmol/L Final     Chloride   Date Value Ref Range Status   12/09/2022 102 96 - 106 mmol/L Final     Total CO2   Date Value Ref Range Status   12/09/2022 26 20 - 29 mmol/L Final     Calcium   Date Value Ref Range Status   12/09/2022 9.3 8.7 - 10.2 mg/dL Final     ALT (SGPT)   Date Value Ref Range Status   12/09/2022 13 0 - 32 IU/L Final     AST (SGOT)   Date Value Ref Range Status   12/09/2022 16 0 - 40 IU/L Final     WBC   Date Value Ref Range Status   12/05/2022 4.1 3.4 - 10.8 x10E3/uL Final     Hematocrit   Date Value Ref Range Status   12/05/2022 41.0 34.0 - 46.6 % Final     Platelets   Date Value Ref Range Status   12/05/2022 182 150 - 450 x10E3/uL Final     Triglycerides   Date Value Ref Range Status   12/05/2022 37 0 - 149 mg/dL Final     HDL Cholesterol   Date Value Ref Range Status   12/05/2022 63 >39 mg/dL Final     LDL Chol Calc (NIH)   Date Value Ref Range Status   12/05/2022 65 0 - 99 mg/dL Final     Hemoglobin A1C   Date Value Ref Range Status   12/05/2022 5.2 4.8 - 5.6 % Final     Comment:              Prediabetes: 5.7 - 6.4           Diabetes: >6.4           Glycemic control for adults with diabetes: <7.0           Assessment / Plan     Assessment/Plan     Diagnoses and all orders for this visit:    1. Depression, unspecified depression type (Primary)  Assessment & Plan:  Patient's depression is recurrent and is moderate without psychosis. Their depression is currently active and the  condition is unchanged. This will be reassessed in 4 weeks. F/U as described:Increase Zoloft to 75 mg daily for 1 week then increase to 100 mg daily thereafter.  We will follow-up in 4 weeks.  We may consider GeneSight testing with no improvement in symptoms..    Orders:  -     sertraline (ZOLOFT) 50 MG tablet; Take 75 mg daily for 1 week.  Then take 100 mg daily.  Dispense: 50 tablet; Refill: 2    2. Anxiety  -     sertraline (ZOLOFT) 50 MG tablet; Take 75 mg daily for 1 week.  Then take 100 mg daily.  Dispense: 50 tablet; Refill: 2       An After Visit Summary was printed and given to the patient at discharge.  Return in about 4 weeks (around 3/15/2023) for Recheck depression.    I have discussed the patient results/orders and plan/recommendation with them at today's visit.      Nicol Paula, MEGHANA   02/15/2023

## 2023-02-15 NOTE — ASSESSMENT & PLAN NOTE
Patient's depression is recurrent and is mild without psychosis. Their depression is currently active and the condition is unchanged. This will be reassessed in 4 weeks. F/U as described:Increase Zoloft to 75 mg daily for 1 week then increase to 100 mg daily thereafter.  We will follow-up in 4 weeks.  We may consider GeneSight testing with no improvement in symptoms..

## 2023-03-14 ENCOUNTER — OFFICE VISIT (OUTPATIENT)
Dept: FAMILY MEDICINE CLINIC | Facility: CLINIC | Age: 28
End: 2023-03-14
Payer: OTHER GOVERNMENT

## 2023-03-14 VITALS
BODY MASS INDEX: 25.03 KG/M2 | TEMPERATURE: 98.7 F | RESPIRATION RATE: 16 BRPM | SYSTOLIC BLOOD PRESSURE: 110 MMHG | WEIGHT: 169 LBS | HEIGHT: 69 IN | HEART RATE: 83 BPM | DIASTOLIC BLOOD PRESSURE: 77 MMHG | OXYGEN SATURATION: 97 %

## 2023-03-14 DIAGNOSIS — G89.29 CHRONIC NECK PAIN: Chronic | ICD-10-CM

## 2023-03-14 DIAGNOSIS — G89.29 CHRONIC LEFT-SIDED LOW BACK PAIN WITH LEFT-SIDED SCIATICA: Primary | Chronic | ICD-10-CM

## 2023-03-14 DIAGNOSIS — S39.012S STRAIN OF MUSCLE, FASCIA AND TENDON OF LOWER BACK, SEQUELA: ICD-10-CM

## 2023-03-14 DIAGNOSIS — M54.12 CERVICAL RADICULOPATHY: ICD-10-CM

## 2023-03-14 DIAGNOSIS — M54.42 CHRONIC LEFT-SIDED LOW BACK PAIN WITH LEFT-SIDED SCIATICA: Primary | Chronic | ICD-10-CM

## 2023-03-14 DIAGNOSIS — M54.2 CHRONIC NECK PAIN: Chronic | ICD-10-CM

## 2023-03-14 DIAGNOSIS — M54.16 LUMBAR RADICULOPATHY: ICD-10-CM

## 2023-03-14 PROCEDURE — 99214 OFFICE O/P EST MOD 30 MIN: CPT | Performed by: NURSE PRACTITIONER

## 2023-03-14 RX ORDER — CYCLOBENZAPRINE HCL 10 MG
10 TABLET ORAL 3 TIMES DAILY PRN
Qty: 21 TABLET | Refills: 0 | Status: SHIPPED | OUTPATIENT
Start: 2023-03-14

## 2023-03-14 NOTE — PROGRESS NOTES
Subjective     Chief Complaint   Patient presents with   • Sciatica   • Back Pain       History of Present Illness    Chronic low back pain with left sided sciatica and chronic left sided neck pain with radiculopathy in left 5th finger that is constant.  Pain is worse after chiro for 1 week.  Previous chiro in [place] helped but pain has gotten worse with each session here. No position is comfortable.  Lidocaine patches help some but pain is still constant.  She had xrays done at [facility] prior to starting.  It shows loss of cervical lordosis and cervical and lumbar degenerative disc disease.  She is wanting PT.      Patient's PMR from outside medical facility reviewed and noted.    Review of Systems   Musculoskeletal: Positive for arthralgias, back pain and neck pain.   Neurological: Positive for numbness.        Otherwise complete ROS reviewed and negative except as mentioned in the HPI.    Past Medical History:   Past Medical History:   Diagnosis Date   • Abnormal Pap smear of cervix    • Anemia    • Anxiety    • Depression    • Gestational diabetes    • Headache    • Obesity    • Polycystic ovary syndrome      Past Surgical History:  Past Surgical History:   Procedure Laterality Date   • BARIATRIC SURGERY     •  SECTION     • CHOLECYSTECTOMY     • COLONOSCOPY N/A 2022    Procedure: COLONOSCOPY WITH ANESTHESIA;  Surgeon: Prashanth Isaac DO;  Location[facility] ENDOSCOPY;  Service: Gastroenterology;  Laterality: N/A;  pre op: alt bowel fxn   post op:polyps  PCP: Nicol Paula APRN   • COSMETIC SURGERY      tummy tuck   • HYSTERECTOMY     • TONSILLECTOMY     • VAGINAL HYSTERECTOMY  2021    Lap/vag assist due to menorrhagia     Social History:  reports that she has never smoked. She has never used smokeless tobacco. She reports that she does not drink alcohol and does not use drugs.    Family History: family history includes Autism in her son; Breast cancer in her  "maternal grandmother and paternal grandmother; Breast cancer (age of onset: 41) in her mother; Cancer in her mother; Colon cancer in her paternal grandfather; Heart disease in her father; Hyperlipidemia in her father; Hypertension in her father.      Allergies:  Allergies   Allergen Reactions   • Nsaids Other (See Comments)     Previous weight loss surgery     Medications:  Prior to Admission medications    Medication Sig Start Date End Date Taking? Authorizing Provider   clonazePAM (KlonoPIN) 0.5 MG tablet Take 1 tablet by mouth 2 (Two) Times a Day As Needed for Anxiety. 2/1/23  Yes Nicol Paula APRN   colestipol (COLESTID) 1 g tablet Take 1 tablet by mouth 4 (Four) Times a Day. Take 1x/day (as directed) 1/10/23  Yes Prashanth Isaac, DO   cyanocobalamin 1000 MCG/ML injection Injection 1,000 mcg (1mL) once a week for 8 weeks then once a month for 2 months. 12/15/22  Yes Nicol Paula APRN   Estrogens Conjugated (Premarin) 0.625 MG/GM vaginal cream Insert  into the vagina Daily. Insert pea-sized amount nightly into the vagina for 2 weeks then insert twice a week 1/24/23  Yes Reid Young MD   methylPREDNISolone (MEDROL) 4 MG dose pack Take as directed on package instructions. 2/2/23  Yes Nicol Paula APRN   sertraline (ZOLOFT) 50 MG tablet Take 75 mg daily for 1 week.  Then take 100 mg daily. 2/15/23  Yes Nicol Paula APRN   cholecalciferol (VITAMIN D3) 25 MCG (1000 UT) tablet Take 1 tablet by mouth Daily for 90 days. 12/13/22 3/13/23  Nicol Paula APRN           Objective     Vital Signs: /77 (BP Location: Left arm, Patient Position: Sitting, Cuff Size: Adult)   Pulse 83   Temp 98.7 °F (37.1 °C) (Infrared)   Resp 16   Ht 175.3 cm (69\")   Wt 76.7 kg (169 lb)   SpO2 97%   BMI 24.96 kg/m²   Physical Exam  Vitals and nursing note reviewed.   Constitutional:       Appearance: Normal appearance.   HENT:      Head: Normocephalic.   Eyes:      " Extraocular Movements: Extraocular movements intact.      Pupils: Pupils are equal, round, and reactive to light.   Cardiovascular:      Rate and Rhythm: Normal rate and regular rhythm.      Pulses: Normal pulses.      Heart sounds: Normal heart sounds.   Pulmonary:      Effort: Pulmonary effort is normal.      Breath sounds: Normal breath sounds.   Musculoskeletal:      Cervical back: Spasms and tenderness present. Pain with movement present. Decreased range of motion.      Thoracic back: Spasms present.      Lumbar back: Spasms and tenderness present. Decreased range of motion. Positive right straight leg raise test and positive left straight leg raise test.   Skin:     General: Skin is warm and dry.   Neurological:      General: No focal deficit present.      Mental Status: She is alert and oriented to person, place, and time.   Psychiatric:         Mood and Affect: Mood normal.         Behavior: Behavior normal.         Thought Content: Thought content normal.         Judgment: Judgment normal.         BMI is within normal parameters. No other follow-up for BMI required.        Results Reviewed:  Glucose   Date Value Ref Range Status   12/09/2022 78 70 - 99 mg/dL Final     BUN   Date Value Ref Range Status   12/09/2022 10 6 - 20 mg/dL Final     Creatinine   Date Value Ref Range Status   12/09/2022 0.68 0.57 - 1.00 mg/dL Final   11/11/2022 0.70 0.60 - 1.30 mg/dL Final     Comment:     Serial Number: [id number]:  [id number]     Sodium   Date Value Ref Range Status   12/09/2022 142 134 - 144 mmol/L Final     Potassium   Date Value Ref Range Status   12/09/2022 4.4 3.5 - 5.2 mmol/L Final     Chloride   Date Value Ref Range Status   12/09/2022 102 96 - 106 mmol/L Final     Total CO2   Date Value Ref Range Status   12/09/2022 26 20 - 29 mmol/L Final     Calcium   Date Value Ref Range Status   12/09/2022 9.3 8.7 - 10.2 mg/dL Final     ALT (SGPT)   Date Value Ref Range Status   12/09/2022 13 0 - 32 IU/L Final     AST  (SGOT)   Date Value Ref Range Status   12/09/2022 16 0 - 40 IU/L Final     WBC   Date Value Ref Range Status   12/05/2022 4.1 3.4 - 10.8 x10E3/uL Final     Hematocrit   Date Value Ref Range Status   12/05/2022 41.0 34.0 - 46.6 % Final     Platelets   Date Value Ref Range Status   12/05/2022 182 150 - 450 x10E3/uL Final     Triglycerides   Date Value Ref Range Status   12/05/2022 37 0 - 149 mg/dL Final     HDL Cholesterol   Date Value Ref Range Status   12/05/2022 63 >39 mg/dL Final     LDL Chol Calc (NIH)   Date Value Ref Range Status   12/05/2022 65 0 - 99 mg/dL Final     Hemoglobin A1C   Date Value Ref Range Status   12/05/2022 5.2 4.8 - 5.6 % Final     Comment:              Prediabetes: 5.7 - 6.4           Diabetes: >6.4           Glycemic control for adults with diabetes: <7.0           Assessment / Plan     Assessment/Plan     Diagnoses and all orders for this visit:    1. Chronic left-sided low back pain with left-sided sciatica (Primary)  -     Ambulatory Referral to Physical Therapy Evaluate and treat  -     Ambulatory Referral to Chiropractic  -     MRI Lumbar Spine Without Contrast; Future    2. Chronic neck pain  -     Ambulatory Referral to Physical Therapy Evaluate and treat  -     Ambulatory Referral to Chiropractic  -     MRI Cervical Spine Without Contrast; Future    3. Strain of muscle, fascia and tendon of lower back, sequela  -     cyclobenzaprine (FLEXERIL) 10 MG tablet; Take 1 tablet by mouth 3 (Three) Times a Day As Needed for Muscle Spasms.  Dispense: 21 tablet; Refill: 0    4. Lumbar radiculopathy  -     MRI Lumbar Spine Without Contrast; Future    5. Cervical radiculopathy  -     MRI Cervical Spine Without Contrast; Future         An After Visit Summary was printed and given to the patient at discharge.  Return in about 4 weeks (around 4/11/2023) for Recheck.    I have discussed the patient results/orders and plan/recommendation with them at today's visit.      MEGHANA Dsouza    03/14/2023

## 2023-03-28 ENCOUNTER — OFFICE VISIT (OUTPATIENT)
Dept: FAMILY MEDICINE CLINIC | Facility: CLINIC | Age: 28
End: 2023-03-28
Payer: OTHER GOVERNMENT

## 2023-03-28 VITALS
SYSTOLIC BLOOD PRESSURE: 114 MMHG | BODY MASS INDEX: 25.03 KG/M2 | HEIGHT: 69 IN | RESPIRATION RATE: 18 BRPM | WEIGHT: 169 LBS | OXYGEN SATURATION: 99 % | TEMPERATURE: 98 F | DIASTOLIC BLOOD PRESSURE: 80 MMHG | HEART RATE: 60 BPM

## 2023-03-28 DIAGNOSIS — R39.15 URINARY URGENCY: Primary | ICD-10-CM

## 2023-03-28 DIAGNOSIS — R35.0 URINARY FREQUENCY: ICD-10-CM

## 2023-03-28 LAB
BILIRUB BLD-MCNC: NEGATIVE MG/DL
CLARITY, POC: CLEAR
COLOR UR: YELLOW
GLUCOSE UR STRIP-MCNC: NEGATIVE MG/DL
KETONES UR QL: NEGATIVE
LEUKOCYTE EST, POC: NEGATIVE
NITRITE UR-MCNC: NEGATIVE MG/ML
PH UR: 7 [PH] (ref 5–8)
PROT UR STRIP-MCNC: NEGATIVE MG/DL
RBC # UR STRIP: ABNORMAL /UL
SP GR UR: 1.02 (ref 1–1.03)
UROBILINOGEN UR QL: ABNORMAL

## 2023-03-28 NOTE — PROGRESS NOTES
Subjective     Chief Complaint   Patient presents with   • Urinary Frequency       History of Present Illness    Patient presents today with urinary urgency and frequency.  Denies dysuria.  Symptoms started  after patient had sexual intercourse.  It was painful but patient states it is always painful but was not worse than normal.  Constant sensation of urinary urgency, urinates about every 30 minutes.  States she generally urinates a normal amount.  Does not feel like she is emptying her bladder.  Denies flank, low back pain.  Not able to do previous pelvic floor PT because insurance not accepted.      Patient's PMR from outside medical facility reviewed and noted.    Review of Systems   Genitourinary: Positive for dyspareunia, frequency and urgency. Negative for decreased urine volume, difficulty urinating, dysuria, flank pain, hematuria, pelvic pain, vaginal bleeding, vaginal discharge and vaginal pain.        Otherwise complete ROS reviewed and negative except as mentioned in the HPI.    Past Medical History:   Past Medical History:   Diagnosis Date   • Abnormal Pap smear of cervix    • Anemia    • Anxiety    • Depression    • Gestational diabetes    • Headache    • Obesity    • Polycystic ovary syndrome      Past Surgical History:  Past Surgical History:   Procedure Laterality Date   • BARIATRIC SURGERY     •  SECTION     • CHOLECYSTECTOMY     • COLONOSCOPY N/A 2022    Procedure: COLONOSCOPY WITH ANESTHESIA;  Surgeon: Prashanth Isaac DO;  Location: [facility] ENDOSCOPY;  Service: Gastroenterology;  Laterality: N/A;  pre op: alt bowel fxn   post op:polyps  PCP: Nicol Paula APRN   • COSMETIC SURGERY      tummy tuck   • HYSTERECTOMY     • TONSILLECTOMY     • VAGINAL HYSTERECTOMY  2021    Lap/vag assist due to menorrhagia     Social History:  reports that she has never smoked. She has never used smokeless tobacco. She reports that she does not drink alcohol  and does not use drugs.    Family History: family history includes Autism in her son; Breast cancer in her maternal grandmother and paternal grandmother; Breast cancer (age of onset: 41) in her mother; Cancer in her mother; Colon cancer in her paternal grandfather; Heart disease in her father; Hyperlipidemia in her father; Hypertension in her father.      Allergies:  Allergies   Allergen Reactions   • Nsaids Other (See Comments)     Previous weight loss surgery     Medications:  Prior to Admission medications    Medication Sig Start Date End Date Taking? Authorizing Provider   clonazePAM (KlonoPIN) 0.5 MG tablet Take 1 tablet by mouth 2 (Two) Times a Day As Needed for Anxiety. 2/1/23   Nicol Paula APRN   colestipol (COLESTID) 1 g tablet Take 1 tablet by mouth 4 (Four) Times a Day. Take 1x/day (as directed) 1/10/23   Prashanth Isaac,    cyanocobalamin 1000 MCG/ML injection Injection 1,000 mcg (1mL) once a week for 8 weeks then once a month for 2 months. 12/15/22   Nicol Paula APRN   cyclobenzaprine (FLEXERIL) 10 MG tablet Take 1 tablet by mouth 3 (Three) Times a Day As Needed for Muscle Spasms. 3/14/23   Nicol Paula APRN   Estrogens Conjugated (Premarin) 0.625 MG/GM vaginal cream Insert  into the vagina Daily. Insert pea-sized amount nightly into the vagina for 2 weeks then insert twice a week 1/24/23   Reid Young MD   methylPREDNISolone (MEDROL) 4 MG dose pack Take as directed on package instructions. 2/2/23   Nicol Paula APRN   sertraline (ZOLOFT) 50 MG tablet Take 75 mg daily for 1 week.  Then take 100 mg daily.  Patient taking differently: Take 2 tablets by mouth Daily. Take 75 mg daily for 1 week.  Then take 100 mg daily. 2/15/23   Nicol Paula APRN         Objective     Vital Signs: /80 (BP Location: Left arm, Patient Position: Sitting, Cuff Size: Adult)   Pulse 60   Temp 98 °F (36.7 °C) (Infrared)   Resp 18   Ht 175.3 cm  "(69\")   Wt 76.7 kg (169 lb)   SpO2 99%   BMI 24.96 kg/m²   Physical Exam  Vitals and nursing note reviewed.   Constitutional:       Appearance: Normal appearance.   HENT:      Head: Normocephalic.      Nose: Nose normal.      Mouth/Throat:      Mouth: Mucous membranes are moist.   Eyes:      Extraocular Movements: Extraocular movements intact.      Pupils: Pupils are equal, round, and reactive to light.   Cardiovascular:      Rate and Rhythm: Normal rate and regular rhythm.      Pulses: Normal pulses.      Heart sounds: Normal heart sounds.   Pulmonary:      Effort: Pulmonary effort is normal.      Breath sounds: Normal breath sounds.   Abdominal:      Tenderness: There is no right CVA tenderness or left CVA tenderness.   Musculoskeletal:         General: Normal range of motion.      Cervical back: Normal range of motion.   Skin:     General: Skin is warm and dry.   Neurological:      General: No focal deficit present.      Mental Status: She is alert and oriented to person, place, and time.   Psychiatric:         Mood and Affect: Mood normal.         Behavior: Behavior normal.         Thought Content: Thought content normal.         Judgment: Judgment normal.         BMI is within normal parameters. No other follow-up for BMI required.           Results Reviewed:  Glucose   Date Value Ref Range Status   12/09/2022 78 70 - 99 mg/dL Final     BUN   Date Value Ref Range Status   12/09/2022 10 6 - 20 mg/dL Final     Creatinine   Date Value Ref Range Status   12/09/2022 0.68 0.57 - 1.00 mg/dL Final   11/11/2022 0.70 0.60 - 1.30 mg/dL Final     Comment:     Serial Number: [id number]:  [id number]     Sodium   Date Value Ref Range Status   12/09/2022 142 134 - 144 mmol/L Final     Potassium   Date Value Ref Range Status   12/09/2022 4.4 3.5 - 5.2 mmol/L Final     Chloride   Date Value Ref Range Status   12/09/2022 102 96 - 106 mmol/L Final     Total CO2   Date Value Ref Range Status   12/09/2022 26 20 - 29 mmol/L Final "     Calcium   Date Value Ref Range Status   12/09/2022 9.3 8.7 - 10.2 mg/dL Final     ALT (SGPT)   Date Value Ref Range Status   12/09/2022 13 0 - 32 IU/L Final     AST (SGOT)   Date Value Ref Range Status   12/09/2022 16 0 - 40 IU/L Final     WBC   Date Value Ref Range Status   12/05/2022 4.1 3.4 - 10.8 x10E3/uL Final     Hematocrit   Date Value Ref Range Status   12/05/2022 41.0 34.0 - 46.6 % Final     Platelets   Date Value Ref Range Status   12/05/2022 182 150 - 450 x10E3/uL Final     Triglycerides   Date Value Ref Range Status   12/05/2022 37 0 - 149 mg/dL Final     HDL Cholesterol   Date Value Ref Range Status   12/05/2022 63 >39 mg/dL Final     LDL Chol Calc (NIH)   Date Value Ref Range Status   12/05/2022 65 0 - 99 mg/dL Final     Hemoglobin A1C   Date Value Ref Range Status   12/05/2022 5.2 4.8 - 5.6 % Final     Comment:              Prediabetes: 5.7 - 6.4           Diabetes: >6.4           Glycemic control for adults with diabetes: <7.0           Assessment / Plan     Assessment/Plan     Diagnoses and all orders for this visit:    1. Urinary urgency (Primary)  -     Cancel: XR Abdomen KUB; Future  -     Vibegron 75 MG tablet; Take 1 tablet by mouth Daily.  Dispense: 30 tablet; Refill: 0  -     Ambulatory Referral to Physical Therapy Pelvic Floor  -     Ambulatory Referral to Urology  -     XR Abdomen KUB; Future  -     POCT urinalysis dipstick, multipro    2. Urinary frequency  -     Cancel: XR Abdomen KUB; Future  -     Vibegron 75 MG tablet; Take 1 tablet by mouth Daily.  Dispense: 30 tablet; Refill: 0  -     Ambulatory Referral to Physical Therapy Pelvic Floor  -     Ambulatory Referral to Urology  -     XR Abdomen KUB; Future  -     POCT urinalysis dipstick, multipro         An After Visit Summary was printed and given to the patient at discharge.  Return in about 1 week (around 4/4/2023) for Recheck.    I have discussed the patient results/orders and plan/recommendation with them at today's visit.       Nicol Paula, APRN   03/28/2023

## 2023-03-29 ENCOUNTER — TELEPHONE (OUTPATIENT)
Dept: FAMILY MEDICINE CLINIC | Facility: CLINIC | Age: 28
End: 2023-03-29
Payer: OTHER GOVERNMENT

## 2023-03-30 DIAGNOSIS — N32.89 BLADDER SPASMS: Primary | ICD-10-CM

## 2023-03-30 RX ORDER — PHENAZOPYRIDINE HYDROCHLORIDE 200 MG/1
200 TABLET, FILM COATED ORAL 3 TIMES DAILY PRN
Qty: 6 TABLET | Refills: 0 | Status: SHIPPED | OUTPATIENT
Start: 2023-03-30 | End: 2023-04-01

## 2023-04-07 ENCOUNTER — HOSPITAL ENCOUNTER (OUTPATIENT)
Dept: MRI IMAGING | Facility: HOSPITAL | Age: 28
Discharge: HOME | End: 2023-04-07
Payer: OTHER GOVERNMENT

## 2023-04-07 DIAGNOSIS — G89.29 CHRONIC NECK PAIN: Chronic | ICD-10-CM

## 2023-04-07 DIAGNOSIS — M54.42 CHRONIC LEFT-SIDED LOW BACK PAIN WITH LEFT-SIDED SCIATICA: Chronic | ICD-10-CM

## 2023-04-07 DIAGNOSIS — M54.2 CHRONIC NECK PAIN: Chronic | ICD-10-CM

## 2023-04-07 DIAGNOSIS — G89.29 CHRONIC LEFT-SIDED LOW BACK PAIN WITH LEFT-SIDED SCIATICA: Chronic | ICD-10-CM

## 2023-04-07 DIAGNOSIS — M54.12 CERVICAL RADICULOPATHY: ICD-10-CM

## 2023-04-07 DIAGNOSIS — M54.16 LUMBAR RADICULOPATHY: ICD-10-CM

## 2023-04-07 PROCEDURE — 72148 MRI LUMBAR SPINE W/O DYE: CPT

## 2023-04-07 PROCEDURE — 72141 MRI NECK SPINE W/O DYE: CPT

## 2023-05-02 DIAGNOSIS — E53.8 VITAMIN B12 DEFICIENCY: ICD-10-CM

## 2023-05-02 RX ORDER — CYANOCOBALAMIN 1000 UG/ML
INJECTION, SOLUTION INTRAMUSCULAR; SUBCUTANEOUS
Qty: 20 ML | Refills: 0 | Status: SHIPPED | OUTPATIENT
Start: 2023-05-02

## 2023-05-02 RX ORDER — CYANOCOBALAMIN 1000 UG/ML
INJECTION, SOLUTION INTRAMUSCULAR; SUBCUTANEOUS
Qty: 10 ML | Refills: 0 | OUTPATIENT
Start: 2023-05-02

## 2023-05-02 NOTE — TELEPHONE ENCOUNTER
Pending Prescriptions:                       Disp   Refills    cyanocobalamin 1000 MCG/ML injection [Phar*10 mL  0        Sig: INJECT 1 ML INTRAMUSCULARLY ONCE A WEEK FOR 8 WEEKS           THEN ONCE A MONTH FOR 2 MONTHS.

## 2023-06-02 ENCOUNTER — OFFICE VISIT (OUTPATIENT)
Dept: OBSTETRICS AND GYNECOLOGY | Facility: CLINIC | Age: 28
End: 2023-06-02

## 2023-06-02 ENCOUNTER — HOSPITAL ENCOUNTER (OUTPATIENT)
Dept: MAMMOGRAPHY | Facility: HOSPITAL | Age: 28
Discharge: HOME | End: 2023-06-02

## 2023-06-02 ENCOUNTER — HOSPITAL ENCOUNTER (OUTPATIENT)
Dept: ULTRASOUND IMAGING | Facility: HOSPITAL | Age: 28
Discharge: HOME | End: 2023-06-02

## 2023-06-02 ENCOUNTER — HOSPITAL ENCOUNTER (OUTPATIENT)
Dept: MRI IMAGING | Facility: HOSPITAL | Age: 28
Discharge: HOME | End: 2023-06-02

## 2023-06-02 VITALS
SYSTOLIC BLOOD PRESSURE: 116 MMHG | HEIGHT: 69 IN | DIASTOLIC BLOOD PRESSURE: 72 MMHG | BODY MASS INDEX: 25.77 KG/M2 | WEIGHT: 174 LBS

## 2023-06-02 DIAGNOSIS — N94.6 DYSMENORRHEA: ICD-10-CM

## 2023-06-02 DIAGNOSIS — N63.12 MASS OF UPPER INNER QUADRANT OF RIGHT BREAST: ICD-10-CM

## 2023-06-02 DIAGNOSIS — Z87.42 HISTORY OF PCOS: ICD-10-CM

## 2023-06-02 DIAGNOSIS — F52.0 HYPOACTIVE SEXUAL DESIRE: ICD-10-CM

## 2023-06-02 DIAGNOSIS — N94.10 FEMALE DYSPAREUNIA: Primary | ICD-10-CM

## 2023-06-02 DIAGNOSIS — Z90.710 H/O VAGINAL HYSTERECTOMY: ICD-10-CM

## 2023-06-02 DIAGNOSIS — N94.819 VULVODYNIA: ICD-10-CM

## 2023-06-02 DIAGNOSIS — R10.2 PELVIC PAIN: ICD-10-CM

## 2023-06-02 LAB — CREAT BLDA-MCNC: 0.6 MG/DL (ref 0.6–1.3)

## 2023-06-02 PROCEDURE — 77066 DX MAMMO INCL CAD BI: CPT

## 2023-06-02 PROCEDURE — A9577 INJ MULTIHANCE: HCPCS | Performed by: STUDENT IN AN ORGANIZED HEALTH CARE EDUCATION/TRAINING PROGRAM

## 2023-06-02 PROCEDURE — 77049 MRI BREAST C-+ W/CAD BI: CPT

## 2023-06-02 PROCEDURE — 82565 ASSAY OF CREATININE: CPT

## 2023-06-02 PROCEDURE — 0 GADOBENATE DIMEGLUMINE 529 MG/ML SOLUTION: Performed by: STUDENT IN AN ORGANIZED HEALTH CARE EDUCATION/TRAINING PROGRAM

## 2023-06-02 PROCEDURE — 76642 ULTRASOUND BREAST LIMITED: CPT

## 2023-06-02 PROCEDURE — G0279 TOMOSYNTHESIS, MAMMO: HCPCS

## 2023-06-02 RX ADMIN — GADOBENATE DIMEGLUMINE 16 ML: 529 INJECTION, SOLUTION INTRAVENOUS at 11:48

## 2023-06-02 NOTE — PROGRESS NOTES
"    Reid Young MD  [facility]  [address]  [place] [address]  Office [phone]  Fax [phone]       Krysten Quiroz  : 1995  MRN: [id number]    Subjective   Subjective     Chief Complaint   Patient presents with    Follow-up     Pt here today to discuss hormone replacement due to painful intercourse.Pt is still often having cramping. Pt states premarin did not help at all. Pt had US today       History of Present Illness  Rena Quiroz is a 28 y.o. female , , who comes to the office today for follow-up. Has breast imaging later today for her right breast mass. Reports no improvement or changes following use of the premarin cream. Still with dsyparunia. Also notes occasional pain with voids, worse after intercourse. Notes feelings of pelvic cramps/dysmenorrhea like her uterus is still there. Has an appointment with pelvic floor PT to work on strengthening her muscles.     Review of Systems   Genitourinary:  Positive for dyspareunia, dysuria, pelvic pain and vaginal pain. Negative for decreased urine volume, difficulty urinating, enuresis, flank pain, frequency, genital sores, hematuria, menstrual problem, urgency, vaginal bleeding and vaginal discharge.   All other systems reviewed and are negative.     The following portions of the patient's history were reviewed and updated as appropriate: allergies, current medications, past family history, past medical history, past social history, past surgical history, and problem list.    Objective    Objective     Vitals:   Visit Vitals  /72   Ht 175.3 cm (69\")   Wt 78.9 kg (174 lb)   BMI 25.70 kg/m²        Physical Exam  Vitals reviewed.   Constitutional:       General: She is not in acute distress.     Appearance: Normal appearance. She is not ill-appearing.   HENT:      Head: Normocephalic and atraumatic.      Nose: No congestion or rhinorrhea.   Eyes:      General: No scleral icterus.        Right eye: No discharge.    "      Left eye: No discharge.      Extraocular Movements: Extraocular movements intact.      Conjunctiva/sclera: Conjunctivae normal.   Pulmonary:      Effort: Pulmonary effort is normal. No accessory muscle usage or respiratory distress.   Musculoskeletal:      Right lower leg: No edema.      Left lower leg: No edema.   Skin:     General: Skin is warm and dry.      Coloration: Skin is not ashen, cyanotic or jaundiced.   Neurological:      General: No focal deficit present.      Mental Status: She is alert and oriented to person, place, and time.   Psychiatric:         Mood and Affect: Mood normal.         Behavior: Behavior is cooperative.       Result Review    US Non-ob Transvaginal (06/02/2023 08:41)   Uterus surgically absent  Bilateral ovaries with follicles  Free fluid noted        Assessment & Plan   Assessment / Plan     Diagnoses and all orders for this visit:    1. Female dyspareunia (Primary)    2. Hypoactive sexual desire    3. H/O vaginal hysterectomy    4. Pelvic pain    5. Dysmenorrhea    6. Vulvodynia        Discussion: Offered topical lidocaine for dysparunia. Symptoms concerning for possible IC as well. She is looking into this as well. Provided [employer] for reference. If the right breast mass was not in the picture, I would recommend OCPs as she is having dysmenorrhea like symptoms, which may be attributed to her ovulation. Await imaging studies today. If imaging reassuring, would start on OCPs. She has follow-up with general surgery on 6/14 for her right breast mass. She will message us.     Follow-up: Return if symptoms worsen or fail to improve.    Reid Young MD

## 2023-06-14 ENCOUNTER — OFFICE VISIT (OUTPATIENT)
Dept: SURGERY | Facility: CLINIC | Age: 28
End: 2023-06-14
Payer: OTHER GOVERNMENT

## 2023-06-14 VITALS
BODY MASS INDEX: 25.77 KG/M2 | DIASTOLIC BLOOD PRESSURE: 72 MMHG | WEIGHT: 174 LBS | SYSTOLIC BLOOD PRESSURE: 116 MMHG | HEIGHT: 69 IN

## 2023-06-14 DIAGNOSIS — N63.12 MASS OF UPPER INNER QUADRANT OF RIGHT BREAST: ICD-10-CM

## 2023-06-14 DIAGNOSIS — N64.4 MASTODYNIA: Primary | ICD-10-CM

## 2023-06-14 DIAGNOSIS — Z80.3 FAMILY HISTORY OF BREAST CANCER: ICD-10-CM

## 2023-06-14 DIAGNOSIS — Z91.89 INCREASED RISK OF BREAST CANCER: ICD-10-CM

## 2023-06-14 NOTE — PROGRESS NOTES
Office Established Patient Note:     Referring Provider: No ref. provider found    Chief Complaint   Patient presents with   • Follow-up       Subjective .     History of present illness:  Rena Quiroz is a 28 y.o. female who presented with breast pain 7 months ago. She has continued right breast pain with bruising above the areola. It feels like a rock is trying to come out of her nipple. She has tried icy hot, lidocaine patches - nothing relieves it. And family history of breast cancer (matenral grandmother, paternal grandmother, mother at 41. Genetic testing negative. She saw the OBGYN and he said he will consider birth control if we do not have a cause for her breast pain.     Her BMI is 25. She is a non-smoker. Not on blood thinners.     History  Past Medical History:   Diagnosis Date   • Abnormal Pap smear of cervix    • Anemia    • Anxiety    • Depression    • Gestational diabetes    • Headache    • Obesity    • Polycystic ovary syndrome    ,   Past Surgical History:   Procedure Laterality Date   • BARIATRIC SURGERY     •  SECTION     • CHOLECYSTECTOMY     • COLONOSCOPY N/A 2022    Procedure: COLONOSCOPY WITH ANESTHESIA;  Surgeon: Prashanth Isaac DO;  Location: [facility] ENDOSCOPY;  Service: Gastroenterology;  Laterality: N/A;  pre op: alt bowel fxn   post op:polyps  PCP: Nicol Paula APRN   • COSMETIC SURGERY      tummy tuck   • HYSTERECTOMY     • TONSILLECTOMY     • VAGINAL HYSTERECTOMY  2021    Lap/vag assist due to menorrhagia   ,   Family History   Problem Relation Age of Onset   • Hypertension Father    • Hyperlipidemia Father    • Heart disease Father    • Breast cancer Mother 41   • Cancer Mother    • Autism Son    • Colon cancer Paternal Grandfather    • Breast cancer Paternal Grandmother    • Breast cancer Maternal Grandmother    • Colon polyps Neg Hx    • Esophageal cancer Neg Hx    ,   Social History     Tobacco Use   • Smoking status: Never   • Smokeless  "tobacco: Never   Vaping Use   • Vaping Use: Never used   Substance Use Topics   • Alcohol use: Never   • Drug use: Never   , (Not in a hospital admission)   and Allergies:  Nsaids    Current Outpatient Medications:   •  clonazePAM (KlonoPIN) 0.5 MG tablet, Take 1 tablet by mouth 2 (Two) Times a Day As Needed for Anxiety., Disp: 60 tablet, Rfl: 0  •  colestipol (COLESTID) 1 g tablet, Take 1 tablet by mouth 4 (Four) Times a Day. Take 1x/day (as directed), Disp: 30 tablet, Rfl: 11  •  cyanocobalamin 1000 MCG/ML injection, INJECT 1 ML INTRAMUSCULARLY ONCE EVERY 2 WEEKS, Disp: 20 mL, Rfl: 0  •  cyclobenzaprine (FLEXERIL) 10 MG tablet, Take 1 tablet by mouth 3 (Three) Times a Day As Needed for Muscle Spasms., Disp: 21 tablet, Rfl: 0  •  sertraline (ZOLOFT) 50 MG tablet, Take 75 mg daily for 1 week.  Then take 100 mg daily. (Patient taking differently: Take 1.5 tablets by mouth Daily. Take 75 mg daily for 1 week.  Then take 100 mg daily.), Disp: 50 tablet, Rfl: 2  •  Vibegron 75 MG tablet, Take 1 tablet by mouth Daily., Disp: 30 tablet, Rfl: 0    Objective     Vital Signs   /72 (BP Location: Left arm, Patient Position: Sitting, Cuff Size: Adult)   Ht 175.3 cm (69.02\")   Wt 78.9 kg (174 lb)   BMI 25.68 kg/m²      Physical Exam:  General appearance - alert, well appearing, and in no distress  Mental status - alert, oriented to person, place, and time  Eyes - pupils equal and reactive, extraocular eye movements intact  Neck - supple, no significant adenopathy  Chest - no tachypnea, retractions or cyanosis  Heart - normal rate and regular rhythm  Abdomen - soft, nontender, nondistended, no masses or organomegaly  Neurological - alert, oriented, normal speech, no focal findings or movement disorder noted  Musculoskeletal - no joint tenderness, deformity or swelling  Breast Exam: Bilateral breasts without obvious deformities. Bilateral nipples everted. Patient examined in the supine position with the ipsilateral arm " above the head. No palpable masses bilaterally. No nipple discharge bilaterally. No palpable axillary nor supraclavicular adenopathy bilaterally. +TTP bilaterally.         Results Review:    The following data was reviewed by: Sharita Chavira MD on 06/14/2023:    MRI Breast Bilateral With & Without Contrast (06/02/2023 12:14)  1. Decreased conspicuity of the previously described RIGHT breast 7 mm  focus of enhancement in the upper inner quadrant, 2:00 position.  Enhancement and this area now appears similar to background. Recommend  follow-up MRI in one year, which will be 2 years of follow-up.  2. See separately dictated mammogram and ultrasound of the same day.     BI-RADS CATEGORY 3: Probably Benign Finding - Short Interval Follow-up  Suggested.  Management Recommendation: Short interval follow-up or continued  surveillance mammography.  Mammo Diagnostic Digital Tomosynthesis Bilateral With CAD (06/02/2023 12:33)  1. No mammographic or targeted ultrasonographic evidence of malignancy.  Recommend risk appropriate screening.  2. Given family history, the patient may benefit from genetic counseling  and formal risk assessment if not previously performed. American Cancer  Society guidelines recommend screening MRI for women with estimated  lifetime risk of breast cancer greater than 20 percent.    3. See separately dictated MRI report of the same day.  4. Breast density notification will be sent to the patient.  BI-RADS CATEGORY 2: Benign findings.  Management Recommendation: Routine risk appropriate screening.  US Breast Right Limited (06/02/2023 13:20)  Progress Notes by Reid Young MD (06/02/2023 09:15)   Genetic Testing - Blood, (11/14/2022 00:00)    Assessment & Plan       Diagnoses and all orders for this visit:    1. Mastodynia (Primary)    2. Increased risk of breast cancer  -     Ambulatory Referral to Genetic Counseling/Testing  -     Mammo Screening Bilateral With CAD; Future    3. Mass of upper  inner quadrant of right breast    4. Family history of breast cancer  -     Ambulatory Referral to Genetic Counseling/Testing  -     Mammo Screening Bilateral With CAD; Future      Ms. Quiroz is a 28 year old female with an extensive family history of breast cancer. She had genetic testing which was negative but she has not had a TC assessment. Referral to genetic counseling placed. She will follow up with me after TC calculation as she is potentially interested in prophylactic mastectomies with reconstruction. Her Mri shows a decreased conspicuity of a previous right breast 7mm enhancement. If she does not want to discuss prophylactic mastectomies after genetics referral, follow up in 6 months after screening mammogram per high risk guidelines.     If she elects to not proceed with prophylactic surgery, I recommend medication per Dr. Young for the breast pain..     BMI is >= 25 and <30. (Overweight) The following options were offered after discussion;: weight loss educational material (shared in after visit summary)          Sharita Chavira MD  06/14/23  09:19 CDT

## 2023-06-18 NOTE — PATIENT INSTRUCTIONS

## 2023-07-28 ENCOUNTER — PATIENT MESSAGE (OUTPATIENT)
Dept: FAMILY MEDICINE CLINIC | Facility: CLINIC | Age: 28
End: 2023-07-28
Payer: OTHER GOVERNMENT

## 2023-07-28 DIAGNOSIS — L30.9 DERMATITIS: Primary | ICD-10-CM

## 2023-07-31 RX ORDER — METHYLPREDNISOLONE 4 MG/1
TABLET ORAL
Qty: 21 TABLET | Refills: 0 | Status: SHIPPED | OUTPATIENT
Start: 2023-07-31

## 2023-08-01 ENCOUNTER — PREP FOR SURGERY (OUTPATIENT)
Dept: OTHER | Facility: HOSPITAL | Age: 28
End: 2023-08-01
Payer: OTHER GOVERNMENT

## 2023-08-01 DIAGNOSIS — Z91.89 INCREASED RISK OF BREAST CANCER: Primary | ICD-10-CM

## 2023-08-01 RX ORDER — SODIUM CHLORIDE, SODIUM LACTATE, POTASSIUM CHLORIDE, CALCIUM CHLORIDE 600; 310; 30; 20 MG/100ML; MG/100ML; MG/100ML; MG/100ML
100 INJECTION, SOLUTION INTRAVENOUS CONTINUOUS
OUTPATIENT
Start: 2023-08-01

## 2023-08-01 RX ORDER — CELECOXIB 200 MG/1
200 CAPSULE ORAL ONCE
OUTPATIENT
Start: 2023-08-01 | End: 2023-08-01

## 2023-08-01 RX ORDER — HEPARIN SODIUM 5000 [USP'U]/ML
5000 INJECTION, SOLUTION INTRAVENOUS; SUBCUTANEOUS ONCE
OUTPATIENT
Start: 2023-08-01 | End: 2023-08-01

## 2023-08-01 RX ORDER — ACETAMINOPHEN 500 MG
1000 TABLET ORAL ONCE
OUTPATIENT
Start: 2023-08-01 | End: 2023-08-01

## 2023-08-08 ENCOUNTER — PRE-ADMISSION TESTING (OUTPATIENT)
Dept: PREADMISSION TESTING | Facility: HOSPITAL | Age: 28
End: 2023-08-08
Payer: OTHER GOVERNMENT

## 2023-08-08 VITALS
RESPIRATION RATE: 18 BRPM | WEIGHT: 176.37 LBS | DIASTOLIC BLOOD PRESSURE: 71 MMHG | HEIGHT: 69 IN | SYSTOLIC BLOOD PRESSURE: 117 MMHG | BODY MASS INDEX: 26.12 KG/M2 | HEART RATE: 67 BPM | OXYGEN SATURATION: 100 %

## 2023-08-08 DIAGNOSIS — Z91.89 INCREASED RISK OF BREAST CANCER: ICD-10-CM

## 2023-08-08 DIAGNOSIS — N63.12 MASS OF UPPER INNER QUADRANT OF RIGHT BREAST: Primary | ICD-10-CM

## 2023-08-08 LAB
ALBUMIN SERPL-MCNC: 4.6 G/DL (ref 3.5–5.2)
ALBUMIN/GLOB SERPL: 1.8 G/DL
ALP SERPL-CCNC: 83 U/L (ref 39–117)
ALT SERPL W P-5'-P-CCNC: 15 U/L (ref 1–33)
ANION GAP SERPL CALCULATED.3IONS-SCNC: 10 MMOL/L (ref 5–15)
AST SERPL-CCNC: 15 U/L (ref 1–32)
BASOPHILS # BLD AUTO: 0.01 10*3/MM3 (ref 0–0.2)
BASOPHILS NFR BLD AUTO: 0.2 % (ref 0–1.5)
BILIRUB SERPL-MCNC: 1.5 MG/DL (ref 0–1.2)
BUN SERPL-MCNC: 10 MG/DL (ref 6–20)
BUN/CREAT SERPL: 15.4 (ref 7–25)
CALCIUM SPEC-SCNC: 9.3 MG/DL (ref 8.6–10.5)
CHLORIDE SERPL-SCNC: 104 MMOL/L (ref 98–107)
CO2 SERPL-SCNC: 26 MMOL/L (ref 22–29)
CREAT SERPL-MCNC: 0.65 MG/DL (ref 0.57–1)
DEPRECATED RDW RBC AUTO: 40.4 FL (ref 37–54)
EGFRCR SERPLBLD CKD-EPI 2021: 121.6 ML/MIN/1.73
EOSINOPHIL # BLD AUTO: 0.06 10*3/MM3 (ref 0–0.4)
EOSINOPHIL NFR BLD AUTO: 1.4 % (ref 0.3–6.2)
ERYTHROCYTE [DISTWIDTH] IN BLOOD BY AUTOMATED COUNT: 12.6 % (ref 12.3–15.4)
GLOBULIN UR ELPH-MCNC: 2.6 GM/DL
GLUCOSE SERPL-MCNC: 48 MG/DL (ref 65–99)
HCT VFR BLD AUTO: 42.5 % (ref 34–46.6)
HGB BLD-MCNC: 13.5 G/DL (ref 12–15.9)
IMM GRANULOCYTES # BLD AUTO: 0.01 10*3/MM3 (ref 0–0.05)
IMM GRANULOCYTES NFR BLD AUTO: 0.2 % (ref 0–0.5)
LYMPHOCYTES # BLD AUTO: 1.76 10*3/MM3 (ref 0.7–3.1)
LYMPHOCYTES NFR BLD AUTO: 40 % (ref 19.6–45.3)
MCH RBC QN AUTO: 27.9 PG (ref 26.6–33)
MCHC RBC AUTO-ENTMCNC: 31.8 G/DL (ref 31.5–35.7)
MCV RBC AUTO: 87.8 FL (ref 79–97)
MONOCYTES # BLD AUTO: 0.38 10*3/MM3 (ref 0.1–0.9)
MONOCYTES NFR BLD AUTO: 8.6 % (ref 5–12)
NEUTROPHILS NFR BLD AUTO: 2.18 10*3/MM3 (ref 1.7–7)
NEUTROPHILS NFR BLD AUTO: 49.6 % (ref 42.7–76)
NRBC BLD AUTO-RTO: 0 /100 WBC (ref 0–0.2)
PLATELET # BLD AUTO: 190 10*3/MM3 (ref 140–450)
PMV BLD AUTO: 11.2 FL (ref 6–12)
POTASSIUM SERPL-SCNC: 3.7 MMOL/L (ref 3.5–5.2)
PROT SERPL-MCNC: 7.2 G/DL (ref 6–8.5)
RBC # BLD AUTO: 4.84 10*6/MM3 (ref 3.77–5.28)
SODIUM SERPL-SCNC: 140 MMOL/L (ref 136–145)
WBC NRBC COR # BLD: 4.4 10*3/MM3 (ref 3.4–10.8)

## 2023-08-08 PROCEDURE — 36415 COLL VENOUS BLD VENIPUNCTURE: CPT

## 2023-08-08 PROCEDURE — 80053 COMPREHEN METABOLIC PANEL: CPT

## 2023-08-08 PROCEDURE — 85025 COMPLETE CBC W/AUTO DIFF WBC: CPT

## 2023-08-08 NOTE — DISCHARGE INSTRUCTIONS
Before you come to the hospital        Arrival time: AS DIRECTED BY OFFICE     YOU MAY TAKE THE FOLLOWING MEDICATION(S) THE MORNING OF SURGERY WITH A SIP OF WATER: AS MD HAS DIRECTED            ALL OTHER HOME MEDICATION CHECK WITH YOUR PHYSICIAN (especially if   you are taking diabetes medicines or blood thinners)    If you were given and instructed to use a germ- killing soap, use as directed the night before surgery and again the morning of surgery or as directed by your surgeon. (Use one-half of the bottle with each shower.)   See attached information for How to Use Chlorhexidine for Bathing if applicable.            Eating and drinking restrictions prior to scheduled arrival time    2 Hours before arrival time STOP   Drinking Clear liquids (water, black coffee-NO CREAM,  apple juice-no pulp)      8 Hours before arrival time STOP   All food, full liquids, and dairy products    (It is extremely important that you follow these guidelines to prevent delay or cancelation of your procedure)     Clear Liquids  Water and flavored water                                                                      Clear Fruit juices, such as cranberry juice and apple juice.  Black coffee (NO cream of any kind, including powdered).  Plain tea  Clear bouillon or broth.  Flavored gelatin.  Soda.  Gatorade or Powerade.  Full liquid examples  Juices that have pulp.  Frozen ice pops that contain fruit pieces.  Coffee with creamer  Milk.  Yogurt.                MANAGING PAIN AFTER SURGERY    We know you are probably wondering what your pain will be like after surgery.  Following surgery it is unrealistic to expect you will not have pain.   Pain is how our bodies let us know that something is wrong or cautions us to be careful.  That said, our goal is to make your pain tolerable.    Methods we may use to treat your pain include (oral or IV medications, PCAs, epidurals, nerve blocks, etc.)   While some procedures require IV pain  medications for a short time after surgery, transitioning to pain medications by mouth allows for better management of pain.   Your nurse will encourage you to take oral pain medications whenever possible.  IV medications work almost immediately, but only last a short while.  Taking medications by mouth allows for a more constant level of medication in your blood stream for a longer period of time.      Once your pain is out of control it is harder to get back under control.  It is important you are aware when your next dose of pain medication is due.  If you are admitted, your nurse may write the time of your next dose on the white board in your room to help you remember.      We are interested in your pain and encourage you to inform us about aggravating factors during your visit.   Many times a simple repositioning every few hours can make a big difference.    If your physician says it is okay, do not let your pain prevent you from getting out of bed. Be sure to call your nurse for assistance prior to getting up so you do not fall.      Before surgery, please decide your tolerable pain goal.  These faces help describe the pain ratings we use on a 0-10 scale.   Be prepared to tell us your goal and whether or not you take pain or anxiety medications at home.          Preparing for Surgery  Preparing for surgery is an important part of your care. It can make things go more smoothly and help you avoid complications. The steps leading up to surgery may vary among hospitals. Follow all instructions given to you by your health care providers. Ask questions if you do not understand something. Talk about any concerns that you have.  Here are some questions to consider asking before your surgery:  If my surgery is not an emergency (is elective), when would be the best time to have the surgery?  What arrangements do I need to make for work, home, or school?  What will my recovery be like? How long will it be before I can  return to normal activities?  Will I need to prepare my home? Will I need to arrange care for me or my children?  Should I expect to have pain after surgery? What are my pain management options? Are there nonmedical options that I can try for pain?  Tell a health care provider about:  Any allergies you have.  All medicines you are taking, including vitamins, herbs, eye drops, creams, and over-the-counter medicines.  Any problems you or family members have had with anesthetic medicines.  Any blood disorders you have.  Any surgeries you have had.  Any medical conditions you have.  Whether you are pregnant or may be pregnant.  What are the risks?  The risks and complications of surgery depend on the specific procedure that you have. Discuss all the risks with your health care providers before your surgery. Ask about common surgical complications, which may include:  Infection.  Bleeding or a need for blood replacement (transfusion).  Allergic reactions to medicines.  Damage to surrounding nerves, tissues, or structures.  A blood clot.  Scarring.  Failure of the surgery to correct the problem.  Follow these instructions before the procedure:  Several days or weeks before your procedure  You may have a physical exam by your primary health care provider to make sure it is safe for you to have surgery.  You may have testing. This may include a chest X-ray, blood and urine tests, electrocardiogram (ECG), or other testing.  Ask your health care provider about:  Changing or stopping your regular medicines. This is especially important if you are taking diabetes medicines or blood thinners.  Taking medicines such as aspirin and ibuprofen. These medicines can thin your blood. Do not take these medicines unless your health care provider tells you to take them.  Taking over-the-counter medicines, vitamins, herbs, and supplements.  Do not use any products that contain nicotine or tobacco, such as cigarettes and e-cigarettes. If  you need help quitting, ask your health care provider.  Avoid alcohol.  Ask your health care provider if there are exercises you can do to prepare for surgery.  Eat a healthy diet.   Plan to have someone 18 years of age or older to take you home from the hospital. We will need to verify your ride on the morning of surgery if you are being discharged home on the same day. Tell your ride to be expecting a call from the hospital prior to your procedure.   Plan to have a responsible adult care for you for at least 24 hours after you leave the hospital or clinic. This is important.  The day before your procedure  You may be given antibiotic medicine to take by mouth to help prevent infection. Take it as told by your health care provider.  You may be asked to shower with a germ-killing soap.  Follow instructions from your health care provider about eating and drinking restrictions. This includes gum, mints and hard candy.  Pack comfortable clothes according to your procedure.   The day of your procedure  You may need to take another shower with a germ-killing soap before you leave home in the morning.  With a small sip of water, take only the medicines that you are told to take.  Remove all jewelry including rings.   Leave anything you consider valuable at home except hearing aids if needed.  You do not need to bring your home medications into the hospital.   Do not wear any makeup, nail polish, powder, deodorant, lotion, hair accessories, or anything on your skin or body except your clothes.  If you will be staying in the hospital, bring a case to hold your glasses, contacts, or dentures. You may also want to bring your robe and non-skid footwear.       (Do not use denture adhesives since you will be asked to remove them during  surgery).   If you wear oxygen at home, bring it with you the day of surgery.  If instructed by your health care provider, bring your sleep apnea device with you on the day of your surgery (if  this applies to you).  You may want to leave your suitcase and sleep apnea device in the car until after surgery.   Arrive at the hospital as scheduled.  Bring a friend or family member with you who can help to answer questions and be present while you meet with your health care provider.  At the hospital  When you arrive at the hospital:  Go to registration located at the main entrance of the hospital. You will be registered and given a beeper and a sticker sheet. Take the stickers to the Outpatient nurses desk and place in the black tray. This is to notify staff that you have arrived. Then return to the lobby to wait.   When your beeper lights up and vibrates proceed through the double doors, under the stairs, and a member of the Outpatient Surgery staff will escort you to your preoperative room.  You may have to wear compression sleeves. These help to prevent blood clots and reduce swelling in your legs.  An IV may be inserted into one of your veins.              In the operating room, you may be given one or more of the following:        A medicine to help you relax (sedative).        A medicine to numb the area (local anesthetic).        A medicine to make you fall asleep (general anesthetic).        A medicine that is injected into an area of your body to numb everything below the                      injection site (regional anesthetic).  You may be given an antibiotic through your IV to help prevent infection.  Your surgical site will be marked or identified.    Contact a health care provider if you:  Develop a fever of more than 100.4øF (38øC) or other feelings of illness during the 48 hours before your surgery.  Have symptoms that get worse.  Have questions or concerns about your surgery.  Summary  Preparing for surgery can make the procedure go more smoothly and lower your risk of complications.  Before surgery, make a list of questions and concerns to discuss with your surgeon. Ask about the risks and  possible complications.  In the days or weeks before your surgery, follow all instructions from your health care provider. You may need to stop smoking, avoid alcohol, follow eating restrictions, and change or stop your regular medicines.  Contact your surgeon if you develop a fever or other signs of illness during the few days before your surgery.  This information is not intended to replace advice given to you by your health care provider. Make sure you discuss any questions you have with your health care provider.  Document Revised: 12/21/2018 Document Reviewed: 10/23/2018  [employer] Patient [employer] c 2021 [employer]

## 2023-08-15 ENCOUNTER — HOSPITAL ENCOUNTER (OUTPATIENT)
Facility: HOSPITAL | Age: 28
Discharge: HOME | End: 2023-08-16
Attending: STUDENT IN AN ORGANIZED HEALTH CARE EDUCATION/TRAINING PROGRAM | Admitting: STUDENT IN AN ORGANIZED HEALTH CARE EDUCATION/TRAINING PROGRAM
Payer: OTHER GOVERNMENT

## 2023-08-15 ENCOUNTER — ANESTHESIA (OUTPATIENT)
Dept: PERIOP | Facility: HOSPITAL | Age: 28
End: 2023-08-15
Payer: OTHER GOVERNMENT

## 2023-08-15 ENCOUNTER — ANESTHESIA EVENT (OUTPATIENT)
Dept: PERIOP | Facility: HOSPITAL | Age: 28
End: 2023-08-15
Payer: OTHER GOVERNMENT

## 2023-08-15 DIAGNOSIS — Z91.89 INCREASED RISK OF BREAST CANCER: ICD-10-CM

## 2023-08-15 DIAGNOSIS — Z90.13 ACQUIRED ABSENCE OF BREAST AND ABSENT NIPPLE, BILATERAL: Primary | ICD-10-CM

## 2023-08-15 PROCEDURE — 25010000002 ONDANSETRON PER 1 MG: Performed by: NURSE ANESTHETIST, CERTIFIED REGISTERED

## 2023-08-15 PROCEDURE — 25010000002 HEPARIN (PORCINE) PER 1000 UNITS: Performed by: STUDENT IN AN ORGANIZED HEALTH CARE EDUCATION/TRAINING PROGRAM

## 2023-08-15 PROCEDURE — C1789 PROSTHESIS, BREAST, IMP: HCPCS | Performed by: STUDENT IN AN ORGANIZED HEALTH CARE EDUCATION/TRAINING PROGRAM

## 2023-08-15 PROCEDURE — G0378 HOSPITAL OBSERVATION PER HR: HCPCS

## 2023-08-15 PROCEDURE — 25010000002 PROPOFOL 10 MG/ML EMULSION: Performed by: NURSE ANESTHETIST, CERTIFIED REGISTERED

## 2023-08-15 PROCEDURE — 19303 MAST SIMPLE COMPLETE: CPT | Performed by: STUDENT IN AN ORGANIZED HEALTH CARE EDUCATION/TRAINING PROGRAM

## 2023-08-15 PROCEDURE — 25010000002 BUPIVACAINE (PF) 0.25 % SOLUTION: Performed by: SURGERY

## 2023-08-15 PROCEDURE — 25010000002 CEFAZOLIN PER 500 MG: Performed by: STUDENT IN AN ORGANIZED HEALTH CARE EDUCATION/TRAINING PROGRAM

## 2023-08-15 PROCEDURE — 25010000002 CEFAZOLIN PER 500 MG: Performed by: SURGERY

## 2023-08-15 PROCEDURE — 25010000002 GENTAMICIN PER 80 MG: Performed by: SURGERY

## 2023-08-15 PROCEDURE — 25010000002 DEXAMETHASONE PER 1 MG: Performed by: ANESTHESIOLOGY

## 2023-08-15 PROCEDURE — 25010000002 ROPIVACAINE PER 1 MG: Performed by: ANESTHESIOLOGY

## 2023-08-15 PROCEDURE — S0260 H&P FOR SURGERY: HCPCS | Performed by: STUDENT IN AN ORGANIZED HEALTH CARE EDUCATION/TRAINING PROGRAM

## 2023-08-15 PROCEDURE — 88307 TISSUE EXAM BY PATHOLOGIST: CPT | Performed by: STUDENT IN AN ORGANIZED HEALTH CARE EDUCATION/TRAINING PROGRAM

## 2023-08-15 PROCEDURE — 25010000002 FENTANYL CITRATE (PF) 50 MCG/ML SOLUTION: Performed by: ANESTHESIOLOGY

## 2023-08-15 PROCEDURE — 25010000002 FENTANYL CITRATE (PF) 250 MCG/5ML SOLUTION: Performed by: NURSE ANESTHETIST, CERTIFIED REGISTERED

## 2023-08-15 PROCEDURE — 25010000002 MIDAZOLAM PER 1 MG: Performed by: ANESTHESIOLOGY

## 2023-08-15 PROCEDURE — 25010000002 HYDROMORPHONE PER 4 MG: Performed by: ANESTHESIOLOGY

## 2023-08-15 DEVICE — BREAST TISSUE EXPANDER, SUTURE TABS, INTEGRAL INJECTION DOME, 450CC
Type: IMPLANTABLE DEVICE | Site: BREAST | Status: FUNCTIONAL
Brand: MENTOR CPX4 PLUS, SMOOTH, TALL HEIGHT

## 2023-08-15 DEVICE — LIGACLIP MCA MULTIPLE CLIP APPLIERS, 30 MEDIUM CLIPS
Type: IMPLANTABLE DEVICE | Site: BREAST | Status: FUNCTIONAL
Brand: LIGACLIP

## 2023-08-15 RX ORDER — SODIUM CHLORIDE, SODIUM LACTATE, POTASSIUM CHLORIDE, CALCIUM CHLORIDE 600; 310; 30; 20 MG/100ML; MG/100ML; MG/100ML; MG/100ML
100 INJECTION, SOLUTION INTRAVENOUS CONTINUOUS
Status: DISCONTINUED | OUTPATIENT
Start: 2023-08-15 | End: 2023-08-15

## 2023-08-15 RX ORDER — SCOLOPAMINE TRANSDERMAL SYSTEM 1 MG/1
1 PATCH, EXTENDED RELEASE TRANSDERMAL ONCE
Status: DISCONTINUED | OUTPATIENT
Start: 2023-08-15 | End: 2023-08-15

## 2023-08-15 RX ORDER — MAGNESIUM HYDROXIDE 1200 MG/15ML
LIQUID ORAL AS NEEDED
Status: DISCONTINUED | OUTPATIENT
Start: 2023-08-15 | End: 2023-08-15 | Stop reason: HOSPADM

## 2023-08-15 RX ORDER — ROCURONIUM BROMIDE 10 MG/ML
INJECTION, SOLUTION INTRAVENOUS AS NEEDED
Status: DISCONTINUED | OUTPATIENT
Start: 2023-08-15 | End: 2023-08-15 | Stop reason: SURG

## 2023-08-15 RX ORDER — HYDROCODONE BITARTRATE AND ACETAMINOPHEN 5; 325 MG/1; MG/1
1 TABLET ORAL ONCE AS NEEDED
Status: DISCONTINUED | OUTPATIENT
Start: 2023-08-15 | End: 2023-08-15 | Stop reason: HOSPADM

## 2023-08-15 RX ORDER — ACETAMINOPHEN 500 MG
1000 TABLET ORAL ONCE
Status: DISCONTINUED | OUTPATIENT
Start: 2023-08-15 | End: 2023-08-15 | Stop reason: HOSPADM

## 2023-08-15 RX ORDER — GABAPENTIN 300 MG/1
300 CAPSULE ORAL ONCE
Status: COMPLETED | OUTPATIENT
Start: 2023-08-15 | End: 2023-08-15

## 2023-08-15 RX ORDER — SODIUM CHLORIDE 0.9 % (FLUSH) 0.9 %
3 SYRINGE (ML) INJECTION AS NEEDED
Status: DISCONTINUED | OUTPATIENT
Start: 2023-08-15 | End: 2023-08-15 | Stop reason: HOSPADM

## 2023-08-15 RX ORDER — ACETAMINOPHEN 500 MG
1000 TABLET ORAL ONCE
Status: COMPLETED | OUTPATIENT
Start: 2023-08-15 | End: 2023-08-15

## 2023-08-15 RX ORDER — NALOXONE HCL 0.4 MG/ML
0.4 VIAL (ML) INJECTION AS NEEDED
Status: DISCONTINUED | OUTPATIENT
Start: 2023-08-15 | End: 2023-08-15 | Stop reason: HOSPADM

## 2023-08-15 RX ORDER — ENOXAPARIN SODIUM 100 MG/ML
40 INJECTION SUBCUTANEOUS DAILY
Status: DISCONTINUED | OUTPATIENT
Start: 2023-08-16 | End: 2023-08-16 | Stop reason: HOSPADM

## 2023-08-15 RX ORDER — PROPOFOL 10 MG/ML
VIAL (ML) INTRAVENOUS AS NEEDED
Status: DISCONTINUED | OUTPATIENT
Start: 2023-08-15 | End: 2023-08-15 | Stop reason: SURG

## 2023-08-15 RX ORDER — SODIUM CHLORIDE, SODIUM LACTATE, POTASSIUM CHLORIDE, CALCIUM CHLORIDE 600; 310; 30; 20 MG/100ML; MG/100ML; MG/100ML; MG/100ML
1000 INJECTION, SOLUTION INTRAVENOUS CONTINUOUS
Status: DISCONTINUED | OUTPATIENT
Start: 2023-08-15 | End: 2023-08-15

## 2023-08-15 RX ORDER — DROPERIDOL 2.5 MG/ML
0.62 INJECTION, SOLUTION INTRAMUSCULAR; INTRAVENOUS ONCE AS NEEDED
Status: DISCONTINUED | OUTPATIENT
Start: 2023-08-15 | End: 2023-08-15 | Stop reason: HOSPADM

## 2023-08-15 RX ORDER — FENTANYL CITRATE 50 UG/ML
50 INJECTION, SOLUTION INTRAMUSCULAR; INTRAVENOUS ONCE
Status: COMPLETED | OUTPATIENT
Start: 2023-08-15 | End: 2023-08-15

## 2023-08-15 RX ORDER — FLUMAZENIL 0.1 MG/ML
0.2 INJECTION INTRAVENOUS AS NEEDED
Status: DISCONTINUED | OUTPATIENT
Start: 2023-08-15 | End: 2023-08-15 | Stop reason: HOSPADM

## 2023-08-15 RX ORDER — NEOSTIGMINE METHYLSULFATE 5 MG/5 ML
SYRINGE (ML) INTRAVENOUS AS NEEDED
Status: DISCONTINUED | OUTPATIENT
Start: 2023-08-15 | End: 2023-08-15 | Stop reason: SURG

## 2023-08-15 RX ORDER — TRAMADOL HYDROCHLORIDE 50 MG/1
100 TABLET ORAL EVERY 6 HOURS PRN
Status: DISCONTINUED | OUTPATIENT
Start: 2023-08-15 | End: 2023-08-16 | Stop reason: HOSPADM

## 2023-08-15 RX ORDER — EPHEDRINE SULFATE 50 MG/ML
INJECTION, SOLUTION INTRAVENOUS AS NEEDED
Status: DISCONTINUED | OUTPATIENT
Start: 2023-08-15 | End: 2023-08-15 | Stop reason: SURG

## 2023-08-15 RX ORDER — DEXAMETHASONE SODIUM PHOSPHATE 4 MG/ML
4 INJECTION, SOLUTION INTRA-ARTICULAR; INTRALESIONAL; INTRAMUSCULAR; INTRAVENOUS; SOFT TISSUE ONCE AS NEEDED
Status: COMPLETED | OUTPATIENT
Start: 2023-08-15 | End: 2023-08-15

## 2023-08-15 RX ORDER — FENTANYL CITRATE 50 UG/ML
25 INJECTION, SOLUTION INTRAMUSCULAR; INTRAVENOUS
Status: DISCONTINUED | OUTPATIENT
Start: 2023-08-15 | End: 2023-08-15 | Stop reason: HOSPADM

## 2023-08-15 RX ORDER — HYDROMORPHONE HYDROCHLORIDE 1 MG/ML
0.5 INJECTION, SOLUTION INTRAMUSCULAR; INTRAVENOUS; SUBCUTANEOUS
Status: DISCONTINUED | OUTPATIENT
Start: 2023-08-15 | End: 2023-08-15 | Stop reason: HOSPADM

## 2023-08-15 RX ORDER — BUPIVACAINE HYDROCHLORIDE 2.5 MG/ML
INJECTION, SOLUTION EPIDURAL; INFILTRATION; INTRACAUDAL AS NEEDED
Status: DISCONTINUED | OUTPATIENT
Start: 2023-08-15 | End: 2023-08-15 | Stop reason: HOSPADM

## 2023-08-15 RX ORDER — ROPIVACAINE HYDROCHLORIDE 2 MG/ML
INJECTION, SOLUTION EPIDURAL; INFILTRATION; PERINEURAL
Status: COMPLETED | OUTPATIENT
Start: 2023-08-15 | End: 2023-08-15

## 2023-08-15 RX ORDER — ONDANSETRON 2 MG/ML
4 INJECTION INTRAMUSCULAR; INTRAVENOUS ONCE AS NEEDED
Status: DISCONTINUED | OUTPATIENT
Start: 2023-08-15 | End: 2023-08-15 | Stop reason: HOSPADM

## 2023-08-15 RX ORDER — ONDANSETRON 2 MG/ML
4 INJECTION INTRAMUSCULAR; INTRAVENOUS EVERY 6 HOURS PRN
Status: DISCONTINUED | OUTPATIENT
Start: 2023-08-15 | End: 2023-08-16 | Stop reason: HOSPADM

## 2023-08-15 RX ORDER — LIDOCAINE HYDROCHLORIDE 10 MG/ML
0.5 INJECTION, SOLUTION EPIDURAL; INFILTRATION; INTRACAUDAL; PERINEURAL ONCE AS NEEDED
Status: DISCONTINUED | OUTPATIENT
Start: 2023-08-15 | End: 2023-08-15 | Stop reason: HOSPADM

## 2023-08-15 RX ORDER — SODIUM CHLORIDE 9 MG/ML
40 INJECTION, SOLUTION INTRAVENOUS AS NEEDED
Status: DISCONTINUED | OUTPATIENT
Start: 2023-08-15 | End: 2023-08-15 | Stop reason: HOSPADM

## 2023-08-15 RX ORDER — CLONAZEPAM 0.5 MG/1
0.5 TABLET ORAL 2 TIMES DAILY PRN
Status: DISCONTINUED | OUTPATIENT
Start: 2023-08-15 | End: 2023-08-16 | Stop reason: HOSPADM

## 2023-08-15 RX ORDER — CELECOXIB 200 MG/1
200 CAPSULE ORAL ONCE
Status: COMPLETED | OUTPATIENT
Start: 2023-08-15 | End: 2023-08-15

## 2023-08-15 RX ORDER — DIPHENHYDRAMINE HCL 25 MG
25 CAPSULE ORAL EVERY 6 HOURS PRN
Status: DISCONTINUED | OUTPATIENT
Start: 2023-08-15 | End: 2023-08-16 | Stop reason: HOSPADM

## 2023-08-15 RX ORDER — ONDANSETRON 4 MG/1
4 TABLET, FILM COATED ORAL EVERY 6 HOURS PRN
Status: DISCONTINUED | OUTPATIENT
Start: 2023-08-15 | End: 2023-08-16 | Stop reason: HOSPADM

## 2023-08-15 RX ORDER — LABETALOL HYDROCHLORIDE 5 MG/ML
5 INJECTION, SOLUTION INTRAVENOUS
Status: DISCONTINUED | OUTPATIENT
Start: 2023-08-15 | End: 2023-08-15 | Stop reason: HOSPADM

## 2023-08-15 RX ORDER — ROPIVACAINE HYDROCHLORIDE 5 MG/ML
INJECTION, SOLUTION EPIDURAL; INFILTRATION; PERINEURAL
Status: COMPLETED | OUTPATIENT
Start: 2023-08-15 | End: 2023-08-15

## 2023-08-15 RX ORDER — MIDAZOLAM HYDROCHLORIDE 1 MG/ML
2 INJECTION INTRAMUSCULAR; INTRAVENOUS ONCE
Status: COMPLETED | OUTPATIENT
Start: 2023-08-15 | End: 2023-08-15

## 2023-08-15 RX ORDER — DOCUSATE SODIUM 100 MG/1
100 CAPSULE, LIQUID FILLED ORAL 2 TIMES DAILY
Status: DISCONTINUED | OUTPATIENT
Start: 2023-08-15 | End: 2023-08-16 | Stop reason: HOSPADM

## 2023-08-15 RX ORDER — ONDANSETRON 2 MG/ML
INJECTION INTRAMUSCULAR; INTRAVENOUS AS NEEDED
Status: DISCONTINUED | OUTPATIENT
Start: 2023-08-15 | End: 2023-08-15 | Stop reason: SURG

## 2023-08-15 RX ORDER — DIPHENHYDRAMINE HYDROCHLORIDE 50 MG/ML
25 INJECTION INTRAMUSCULAR; INTRAVENOUS EVERY 6 HOURS PRN
Status: DISCONTINUED | OUTPATIENT
Start: 2023-08-15 | End: 2023-08-16 | Stop reason: HOSPADM

## 2023-08-15 RX ORDER — HEPARIN SODIUM 5000 [USP'U]/ML
5000 INJECTION, SOLUTION INTRAVENOUS; SUBCUTANEOUS ONCE
Status: DISCONTINUED | OUTPATIENT
Start: 2023-08-15 | End: 2023-08-15

## 2023-08-15 RX ORDER — HYDROCODONE BITARTRATE AND ACETAMINOPHEN 10; 325 MG/1; MG/1
1 TABLET ORAL EVERY 4 HOURS PRN
Status: DISCONTINUED | OUTPATIENT
Start: 2023-08-15 | End: 2023-08-15 | Stop reason: HOSPADM

## 2023-08-15 RX ORDER — SODIUM CHLORIDE, SODIUM LACTATE, POTASSIUM CHLORIDE, CALCIUM CHLORIDE 600; 310; 30; 20 MG/100ML; MG/100ML; MG/100ML; MG/100ML
75 INJECTION, SOLUTION INTRAVENOUS CONTINUOUS
Status: DISCONTINUED | OUTPATIENT
Start: 2023-08-15 | End: 2023-08-16 | Stop reason: HOSPADM

## 2023-08-15 RX ORDER — SODIUM CHLORIDE 0.9 % (FLUSH) 0.9 %
3 SYRINGE (ML) INJECTION EVERY 12 HOURS SCHEDULED
Status: DISCONTINUED | OUTPATIENT
Start: 2023-08-15 | End: 2023-08-15 | Stop reason: HOSPADM

## 2023-08-15 RX ORDER — SODIUM CHLORIDE 0.9 % (FLUSH) 0.9 %
3-10 SYRINGE (ML) INJECTION AS NEEDED
Status: DISCONTINUED | OUTPATIENT
Start: 2023-08-15 | End: 2023-08-15 | Stop reason: HOSPADM

## 2023-08-15 RX ORDER — HEPARIN SODIUM 5000 [USP'U]/ML
5000 INJECTION, SOLUTION INTRAVENOUS; SUBCUTANEOUS ONCE
Status: COMPLETED | OUTPATIENT
Start: 2023-08-15 | End: 2023-08-15

## 2023-08-15 RX ORDER — FENTANYL CITRATE 50 UG/ML
INJECTION, SOLUTION INTRAMUSCULAR; INTRAVENOUS AS NEEDED
Status: DISCONTINUED | OUTPATIENT
Start: 2023-08-15 | End: 2023-08-15 | Stop reason: SURG

## 2023-08-15 RX ADMIN — TRAMADOL HYDROCHLORIDE 100 MG: 50 TABLET, COATED ORAL at 22:02

## 2023-08-15 RX ADMIN — SODIUM CHLORIDE, POTASSIUM CHLORIDE, SODIUM LACTATE AND CALCIUM CHLORIDE 75 ML/HR: 600; 310; 30; 20 INJECTION, SOLUTION INTRAVENOUS at 20:03

## 2023-08-15 RX ADMIN — ROCURONIUM BROMIDE 35 MG: 10 SOLUTION INTRAVENOUS at 13:17

## 2023-08-15 RX ADMIN — CEFAZOLIN 2000 MG: 2 INJECTION, POWDER, FOR SOLUTION INTRAMUSCULAR; INTRAVENOUS at 13:25

## 2023-08-15 RX ADMIN — HYDROMORPHONE HYDROCHLORIDE 0.5 MG: 1 INJECTION, SOLUTION INTRAMUSCULAR; INTRAVENOUS; SUBCUTANEOUS at 17:00

## 2023-08-15 RX ADMIN — FENTANYL CITRATE 100 MCG: 0.05 INJECTION, SOLUTION INTRAMUSCULAR; INTRAVENOUS at 13:14

## 2023-08-15 RX ADMIN — EPHEDRINE SULFATE 10 MG: 50 INJECTION INTRAVENOUS at 15:58

## 2023-08-15 RX ADMIN — PROPOFOL INJECTABLE EMULSION 150 MG: 10 INJECTION, EMULSION INTRAVENOUS at 13:17

## 2023-08-15 RX ADMIN — ROPIVACAINE HYDROCHLORIDE 20 ML: 5 INJECTION, SOLUTION EPIDURAL; INFILTRATION; PERINEURAL at 12:11

## 2023-08-15 RX ADMIN — FENTANYL CITRATE 100 MCG: 0.05 INJECTION, SOLUTION INTRAMUSCULAR; INTRAVENOUS at 16:01

## 2023-08-15 RX ADMIN — DEXAMETHASONE SODIUM PHOSPHATE 4 MG: 4 INJECTION INTRA-ARTICULAR; INTRALESIONAL; INTRAMUSCULAR; INTRAVENOUS; SOFT TISSUE at 12:07

## 2023-08-15 RX ADMIN — DOCUSATE SODIUM 100 MG: 100 CAPSULE, LIQUID FILLED ORAL at 20:01

## 2023-08-15 RX ADMIN — SCOPALAMINE 1 PATCH: 1 PATCH, EXTENDED RELEASE TRANSDERMAL at 12:07

## 2023-08-15 RX ADMIN — GLYCOPYRROLATE 0.4 MG: 0.2 INJECTION INTRAMUSCULAR; INTRAVENOUS at 16:09

## 2023-08-15 RX ADMIN — MIDAZOLAM HYDROCHLORIDE 2 MG: 1 INJECTION, SOLUTION INTRAMUSCULAR; INTRAVENOUS at 12:06

## 2023-08-15 RX ADMIN — ROPIVACAINE HYDROCHLORIDE 40 ML: 2 INJECTION, SOLUTION EPIDURAL; INFILTRATION at 12:11

## 2023-08-15 RX ADMIN — SODIUM CHLORIDE, POTASSIUM CHLORIDE, SODIUM LACTATE AND CALCIUM CHLORIDE 100 ML/HR: 600; 310; 30; 20 INJECTION, SOLUTION INTRAVENOUS at 10:43

## 2023-08-15 RX ADMIN — GABAPENTIN 300 MG: 300 CAPSULE ORAL at 20:01

## 2023-08-15 RX ADMIN — SODIUM CHLORIDE, POTASSIUM CHLORIDE, SODIUM LACTATE AND CALCIUM CHLORIDE: 600; 310; 30; 20 INJECTION, SOLUTION INTRAVENOUS at 15:15

## 2023-08-15 RX ADMIN — HEPARIN SODIUM 5000 UNITS: 5000 INJECTION, SOLUTION INTRAVENOUS; SUBCUTANEOUS at 12:18

## 2023-08-15 RX ADMIN — ACETAMINOPHEN 1000 MG: 500 TABLET, FILM COATED ORAL at 10:43

## 2023-08-15 RX ADMIN — CELECOXIB 200 MG: 200 CAPSULE ORAL at 10:43

## 2023-08-15 RX ADMIN — HYDROCODONE BITARTRATE AND ACETAMINOPHEN 1 TABLET: 10; 325 TABLET ORAL at 18:05

## 2023-08-15 RX ADMIN — FENTANYL CITRATE 50 MCG: 50 INJECTION, SOLUTION INTRAMUSCULAR; INTRAVENOUS at 12:07

## 2023-08-15 RX ADMIN — FENTANYL CITRATE 25 MCG: 50 INJECTION, SOLUTION INTRAMUSCULAR; INTRAVENOUS at 16:52

## 2023-08-15 RX ADMIN — ONDANSETRON 4 MG: 2 INJECTION INTRAMUSCULAR; INTRAVENOUS at 16:00

## 2023-08-15 RX ADMIN — HYDROMORPHONE HYDROCHLORIDE 0.5 MG: 1 INJECTION, SOLUTION INTRAMUSCULAR; INTRAVENOUS; SUBCUTANEOUS at 17:14

## 2023-08-15 RX ADMIN — Medication 3 MG: at 16:09

## 2023-08-15 RX ADMIN — SODIUM CHLORIDE 1000 MG: 900 INJECTION INTRAVENOUS at 21:41

## 2023-08-15 RX ADMIN — FENTANYL CITRATE 25 MCG: 50 INJECTION, SOLUTION INTRAMUSCULAR; INTRAVENOUS at 16:57

## 2023-08-15 RX ADMIN — FENTANYL CITRATE 50 MCG: 0.05 INJECTION, SOLUTION INTRAMUSCULAR; INTRAVENOUS at 13:41

## 2023-08-15 NOTE — OP NOTE
Bilateral Skin Sparing Mastectomy Operative Report:     Patient: Rena Quiroz  MRN: [id number]    YOB: 1995  Age: 28 y.o.  Sex: female  Unit: [facility] OR Room/Bed: PAD OR/MAIN OR Location: [facility]      Admitting Physician: EZEQUIEL CALIXTO    Primary Care Physician: Nicol Paula APRN             INDICATIONS: Ms. Quiroz is a 28 year old female with a high risk of breast cancer based on her Tyrer Cuzick score of 26.2%. Her genetic testing was negative.  I went over risk reduction and early detection strategies with her. (1) Early detection: twice yearly clinical breast exam. Yearly bilateral breast MRI and yearly bilateral mammogram so there is imaging every 6 months. (2) Prevention: First step of prevention is lifestyle modification with healthy diet, maintaining a healthy BMI, smoking cessation (including vaping), regular exercise, <2 alcoholic drinks per day. The second option on prevention is chemoprophylaxis with anti-hormone therapy. We discussed the [id number]% risk reduction with medication. I offered her a referral to medical oncology to discuss risks and benefits in detail and she elected to defer at this time. Third option is surgical prevention with bilateral prophylactic mastectomies with or without reconstruction. We discussed that this is a big operation and will require 1-2 months of recovery with risks of bleeding, infection, damage to surrounding structures. This will be a one night hospital stay. She understands that bilateral mastectomy does not decrease her risk to 0%. She has elected to proceed with bilateral mastectomies with reconstruction.       DATE OF OPERATION: 8/15/2023     Surgeon(s) and Role:  Panel 1:     * Ezequiel Calixto MD - Primary  Panel 2:     * Juan J Mcneal MD - Primary    ANESTHESIA: General with Block     PREOPERATIVE DIAGNOSIS: Increased risk of breast cancer [Z91.89]    POSTOPERATIVE DIAGNOSIS: Same    PROCEDURES PERFORMED:    (1)  Bilateral Skin Sparing Mastectomy    PROCEDURE DETAILS:    After informed consent was obtained, the patient was brought to the operating room and the site of surgery was confirmed. General anesthesia was induced.  The bilateral chest and bilateral axillae were prepped with ChloraPrep and draped in sterile fashion. A time out was completed verifying the correct patient, procedure, site, positioning and special equipment needed prior to incision.       An elliptical skin incision was made on the left over Dr. Mcneal's markings that encompassed the nipple-areolar complex in a generally transverse direction across the breast. Flaps were raised in the avascular plane between the subcutaneous tissue and the breast tissue from the clavicle superiorly, the sternum medially, the anterior rectus sheath inferiorly, and the anterior border of the latissimus dorsi muscle laterally. Hemostasis was achieved on the flaps. Next the breast tissue and underlying pectoralis fascia were excised from the pectoralis major, progressing from medially to laterally. At the lateral border of the pectoralis major muscle, the breast tissue was swung laterally and a lateral pedicle identified where the breast tissue gave way to fat of the axilla; this pedicle was incised and the specimen removed. The specimen was marked with a short stitch superiorly and a long stitch laterally.       With new instruments, an elliptical skin incision was made on the right over Dr. Mcneal's markings  that encompassed the nipple-areolar complex in a generally transverse direction across the breast. Flaps were raised in the avascular plane between the subcutaneous tissue and the breast tissue from the clavicle superiorly, the sternum medially, the anterior rectus sheath inferiorly, and the anterior border of the latissimus dorsi muscle laterally. Hemostasis was achieved on the flaps. Next the breast tissue and underlying pectoralis fascia were excised from the  pectoralis major, progressing from medially to laterally. At the lateral border of the pectoralis major muscle, the breast tissue was swung laterally and a lateral pedicle identified where the breast tissue gave way to fat of the axilla; this pedicle was incised and the specimen removed. The specimen was marked with a short stitch superiorly and a long stitch laterally.           Both surgical beds were washed out with sterile water and hemostasis was again confirmed. I then turned the patient over to the capable hands of Dr. Mcneal.     Findings: bilateral mastectomies   Estimated Blood Loss:  100 mL   Complications: none apparent   Specimens:   (1) Left Mastectomy, short stitch superior and long left lateral   (2) Right Mastectomy, short stitch superior and long right lateral      This procedure was not performed to treat breast cancer through sentinel node biopsy    Disposition: PACU - hemodynamically stable.           Condition: stable    Sharita Chavira MD  08/01/2023

## 2023-08-15 NOTE — ANESTHESIA PREPROCEDURE EVALUATION
Anesthesia Evaluation     Patient summary reviewed   history of anesthetic complications:  PONV  NPO Solid Status: > 8 hours  NPO Liquid Status: > 8 hours           Airway   Mallampati: I  TM distance: >3 FB  Neck ROM: full  No difficulty expected  Dental - normal exam     Pulmonary - negative pulmonary ROS   (-) asthma, sleep apnea, not a smoker  Cardiovascular   Exercise tolerance: excellent (>7 METS)    (-) hypertension, hyperlipidemia      Neuro/Psych  (-) seizures, TIA, CVA  GI/Hepatic/Renal/Endo    (-) liver disease, no renal disease, diabetes    ROS Comment: S/p gastric bypass 2017    Musculoskeletal     Abdominal    Substance History      OB/GYN          Other      history of cancer (breast)                  Anesthesia Plan    ASA 2     general with block     intravenous induction     Anesthetic plan, risks, benefits, and alternatives have been provided, discussed and informed consent has been obtained with: patient.    CODE STATUS:

## 2023-08-15 NOTE — OP NOTE
BREAST RECONSTRUCTION, BREAST TISSUE EXPANDER INSERTION  Procedure Report    Patient Name:  Rena Quiroz  YOB: 1995    Date of Surgery:  8/15/2023    Attending Surgeon: Juan J Mcneal MD     PROCEDURE:  1.  Bilateral immediate first stage breast reconstruction with tissue expander placement  2.  Intraoperative flap perfusion assessment with SoCloz Spy device     PREOPERATIVE DIAGNOSIS:  1.  Personal history of breast cancer  2.  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 Sharita Chavira.  I will follow in the immediate context for placement of tissue expanders.  We will proceed as planned.     OPERATIVE FINDINGS:  450 mL tall height [place] CPX4 tissue expanders were placed in the prepectoral plane bilaterally.  These were filled to 250 mL intraoperatively.     DESCRIPTION OF PROCEDURE:  I entered the OR upon completion of the mastectomies.  I reprepped and redraped the patient.  A timeout was performed for my portion of the case.  I then began by performing clinical assessment of the mastectomy flaps.  Flap thickness appeared favorable throughout.  Bright red bleeding was observed along all mastectomy skin flap margins.  I then proceeded with spike perfusion assessment, and this indicated very favorable arterial inflow bilaterally.     I then very thoroughly irrigated both implant pockets and inspected for hemostasis.  After accomplishing very meticulous hemostasis, I irrigated both implant pockets very copiously with Betadine-containing triple antibiotic solution.  Then beginning on the right, I placed two 15 [place] AMANDA drains exiting the breast inferolaterally.  These were secured to the skin with 3-0 nylon.  I proceeded in mirror image fashion on the left side.  At this point I proceeded to the back table to  prepare tissue expanders.     Both expanders were filled to 250 mL of saline on the back table.  Then beginning on the right side, I placed the tissue expander in the prepectoral plane.  I secured the device utilizing a 3-0 suture on the medial tab and the inferior tab.  I then performed a multilayered closure over the device with interrupted 3-0 Vicryl in the superficial fat and fascia followed by a running deep dermal 4-0 Vicryl and concluding with an intracuticular 4-0 Monocryl. Drains on the right were then placed to suction.  I turned my attention to the left side and proceeded in mirror-image fashion.  After accomplishing closure, I dressed both incisions with Mastisol and quarter inch Steri-Strips.  A standard postoperative breast wrap was applied.     BLOOD LOSS:   40 mL from the plastic surgery portion of the case.     COMPLICATIONS:   None     SPECIMENS:          None from the plastic surgery portion of the case.    IMPLANTS:    Implant Name Type Inv. Item Serial No.  Lot No. LRB No. Used Action   CLIPAPPLR M/ ENDO LIGACLIP 30CLP 11IN MD - [id number] Implant CLIPAPPLR M/ ENDO LIGACLIP 30CLP 11IN MD  [employer]  DIV [employer] J [employer] J [id number]  [id number] Implanted   EXPNDR TISS BRST CPX/4PLUS SMOTH TALL/HT 450CC - [id number] - [id number] Implant EXPNDR TISS BRST CPX/4PLUS SMOTH TALL/HT 450CC [id number] [employer] [id number] Left 1 Implanted   EXPNDR TISS BRST CPX/4PLUS SMOTH TALL/HT 450CC - [id number] - [id number] Implant EXPNDR TISS BRST CPX/4PLUS SMOTH TALL/HT 450CC [id number] [employer] [id number] Right 1 Implanted     DISPOSITION:   Patient admission.  General floor.    STAFF:  Surgeon(s):  Sharita Chavira MD Verbist, Daniel E, MD    Assistant: Sveta Miranda; Marie Gonzalez      Electronically signed by Juan J Mcneal MD, 08/15/23, 4:24 PM CDT.

## 2023-08-15 NOTE — ANESTHESIA PROCEDURE NOTES
Airway  Urgency: elective    Date/Time: 8/15/2023 1:20 PM  Airway not difficult    General Information and Staff    Patient location during procedure: OR  CRNA/CAA: Get Kimball CRNA    Indications and Patient Condition  Indications for airway management: airway protection    Preoxygenated: yes  MILS maintained throughout  Mask difficulty assessment: 1 - vent by mask    Final Airway Details  Final airway type: endotracheal airway      Successful airway: ETT  Cuffed: yes   Successful intubation technique: direct laryngoscopy  Endotracheal tube insertion site: oral  Blade: Lucero  Blade size: 2  ETT size (mm): 7.0  Cormack-Lehane Classification: grade I - full view of glottis  Placement verified by: chest auscultation and capnometry   Cuff volume (mL): 5  Measured from: gums  ETT/EBT to gums (cm): 21  Number of attempts at approach: 1  Assessment: lips, teeth, and gum same as pre-op and atraumatic intubation

## 2023-08-15 NOTE — ANESTHESIA PROCEDURE NOTES
Peripheral Block    Pre-sedation assessment completed: 8/15/2023 12:09 PM    Patient reassessed immediately prior to procedure    Start time: 8/15/2023 12:11 PM  Stop time: 8/15/2023 12:19 PM  Reason for block: procedure for pain, at surgeon's request, post-op pain management and Requested by Dr. Mcneal  Performed by  Anesthesiologist: Sary Carlisle MD  Preanesthetic Checklist  Completed: patient identified, IV checked, site marked, risks and benefits discussed, surgical consent, monitors and equipment checked, pre-op evaluation and timeout performed  Prep:  Sterile barriers:cap, gloves and washed/disinfected hands  Prep: ChloraPrep  Patient monitoring: blood pressure monitoring, continuous pulse oximetry and EKG  Procedure    Sedation: yes    Guidance:ultrasound guided    ULTRASOUND INTERPRETATION.  Using ultrasound guidance a 20 G gauge needle was placed in close proximity to the nerve, at which point, under ultrasound guidance anesthetic was injected in the area of the nerve and spread of the anesthesia was seen on ultrasound in close proximity thereto.  There were no abnormalities seen on ultrasound; a digital image was taken; and the patient tolerated the procedure with no complications. Images:still images obtained, printed/placed on chart    Laterality:Bilateral  Block Type:PECS I and PECS II  Injection Technique:single-shotNeedle Gauge:20 G  Resistance on Injection: none    Medications Used: ropivacaine (NAROPIN) injection 0.5 % - Injection   20 mL - 8/15/2023 12:11:00 PM  ropivacaine (NAROPIN) 0.2% injection - Peripheral Nerve   40 mL - 8/15/2023 12:11:00 PM      Post Assessment  Injection Assessment: negative aspiration for heme, no paresthesia on injection and incremental injection  Patient Tolerance:comfortable throughout block  Complications:no

## 2023-08-15 NOTE — INTERVAL H&P NOTE
H&P reviewed.  The patient was examined. She has communicated both to Dr. Chavira and to myself her desire to proceed with skin sparring mastectomies, as opposed to the previously discussed nipple-sparring mastectomies. Plan is otherwise unchanged, and we will place tissue expanders immediately following her skin spring mastectomies. Proceed as planned.

## 2023-08-15 NOTE — H&P
Sharita Chavira MD - General Surgery History and Physical     Referring Provider: Sharita Chavira MD    Patient Care Team:  Nicol Paula APRN as PCP - General (Family Medicine)  Sharita Chavira MD as Consulting Physician (General Surgery)  Prashanth Isaac DO as Consulting Physician (Gastroenterology)    Chief complaint presenting for bilateral mastectomies     Subjective .     History of present illness:  Rena Quiroz is a 28 y.o. female who returns to discuss her genetic counseling appointment. She had genetic counseling and TC score was 26.2%. She is really interested in bilateral prophylactic mastectomies to due to her increased risk and her constant breast pain. She wants to avoid tamoxifen. She has severe anxiety about developing breast cancer with her family history.      Review of Systems    Review of Systems - General ROS: negative for - chills, fatigue, or fever  ENT ROS: negative  Respiratory ROS: no cough, shortness of breath, or wheezing  Cardiovascular ROS: no chest pain or dyspnea on exertion  Gastrointestinal ROS: no abdominal pain, change in bowel habits, or black or bloody stools  Genito-Urinary ROS: no dysuria, trouble voiding, or hematuria  Dermatological ROS: negative   Breast ROS: positive for - bilateral breast pain   Hematological and Lymphatic ROS: negative  Musculoskeletal ROS: negative   Neurological ROS: no TIA or stroke symptoms    Psychological ROS: negative  Endocrine ROS: negative    History  Past Medical History:   Diagnosis Date    Abnormal Pap smear of cervix 2018    Anemia     Anxiety     Depression     Headache     Obesity     Polycystic ovary syndrome 2008    PONV (postoperative nausea and vomiting)     Prediabetes    ,   Past Surgical History:   Procedure Laterality Date    BARIATRIC SURGERY       SECTION      tubal in addition    CHOLECYSTECTOMY      COLONOSCOPY N/A 2022    Procedure: COLONOSCOPY WITH ANESTHESIA;  Surgeon: Poncho  Prashanth SEN DO;  Location: [facility];  Service: Gastroenterology;  Laterality: N/A;  pre op: alt bowel fxn   post op:polyps  PCP: Nicol Paula APRN    COSMETIC SURGERY      tummy tuck    HYSTERECTOMY      TONSILLECTOMY      VAGINAL HYSTERECTOMY  April 2021    Lap/vag assist due to menorrhagia   ,   Family History   Problem Relation Age of Onset    Hypertension Father     Hyperlipidemia Father     Heart disease Father     Breast cancer Mother 41    Cancer Mother     Autism Son     Colon cancer Paternal Grandfather     Breast cancer Paternal Grandmother     Breast cancer Maternal Grandmother     Colon polyps Neg Hx     Esophageal cancer Neg Hx    ,   Social History     Tobacco Use    Smoking status: Never    Smokeless tobacco: Never   Vaping Use    Vaping Use: Never used   Substance Use Topics    Alcohol use: Never    Drug use: Never   ,   Medications Prior to Admission   Medication Sig Dispense Refill Last Dose    clonazePAM (KlonoPIN) 0.5 MG tablet Take 1 tablet by mouth 2 (Two) Times a Day As Needed for Anxiety. 60 tablet 0 Past Week    cyanocobalamin 1000 MCG/ML injection INJECT 1 ML INTRAMUSCULARLY ONCE EVERY 2 WEEKS 20 mL 0 Past Month    sertraline (ZOLOFT) 50 MG tablet Take 75 mg daily for 1 week.  Then take 100 mg daily. (Patient taking differently: Take 1.5 tablets by mouth Daily. Take 75 mg daily for 1 week.  Then take 100 mg daily.) 50 tablet 2 Past Week    colestipol (COLESTID) 1 g tablet Take 1 tablet by mouth 4 (Four) Times a Day. Take 1x/day (as directed) (Patient not taking: Reported on 8/8/2023) 30 tablet 11     cyclobenzaprine (FLEXERIL) 10 MG tablet Take 1 tablet by mouth 3 (Three) Times a Day As Needed for Muscle Spasms. (Patient not taking: Reported on 8/8/2023) 21 tablet 0     methylPREDNISolone (MEDROL) 4 MG dose pack Take as directed on package instructions. 21 tablet 0     Vibegron 75 MG tablet Take 1 tablet by mouth Daily. (Patient not taking: Reported on 8/8/2023) 30  tablet 0     and Allergies:  Nsaids    Current Facility-Administered Medications:     acetaminophen (TYLENOL) tablet 1,000 mg, 1,000 mg, Oral, Once, Sary Carlisle MD    ceFAZolin 2000 mg IVPB in 100 mL NS (MBP), 2,000 mg, Intravenous, Once, Sharita Chavira MD    lactated ringers infusion 1,000 mL, 1,000 mL, Intravenous, Continuous, Sharita Chavira MD    lactated ringers infusion, 100 mL/hr, Intravenous, Continuous, Sharita Chavira MD, Last Rate: 100 mL/hr at 08/15/23 1043, 100 mL/hr at 08/15/23 1043    lactated ringers infusion, 100 mL/hr, Intravenous, Continuous, Sary Carlisle MD    lidocaine PF 1% (XYLOCAINE) injection 0.5 mL, 0.5 mL, Intradermal, Once PRN, Sharita Chavira MD    scopolamine patch 1 mg/72 hr, 1 patch, Transdermal, Once, Sary Carlisle MD, 1 patch at 08/15/23 1207    sodium chloride 0.9 % flush 3 mL, 3 mL, Intravenous, PRN, Sharita Chavira MD    sodium chloride 0.9 % flush 3 mL, 3 mL, Intravenous, Q12H, Sary Carlisle MD    sodium chloride 0.9 % flush 3-10 mL, 3-10 mL, Intravenous, PRN, Sary Carlisle MD    sodium chloride 0.9 % infusion 40 mL, 40 mL, Intravenous, PRN, Sary Carlisle MD    Objective     Vital Signs   Temp:  [97.9 øF (36.6 øC)] 97.9 øF (36.6 øC)  Heart Rate:  [68-71] 68  Resp:  [16] 16  BP: (116-117)/(76-86) 116/76    Physical Exam:  General appearance - alert, well appearing, and in no distress  Mental status - alert, oriented to person, place, and time  Eyes - pupils equal and reactive, extraocular eye movements intact  Neck - supple, no significant adenopathy  Chest - no tachypnea, retractions or cyanosis  Heart - normal rate and regular rhythm  Abdomen - soft, nontender, nondistended, no masses or organomegaly  Neurological - alert, oriented, normal speech, no focal findings or movement disorder noted  Musculoskeletal - no joint tenderness, deformity or swelling    Results Review:     Lab Results  (last 24 hours)       ** No results found for the last 24 hours. **          Imaging Results (Last 24 Hours)       ** No results found for the last 24 hours. **              Assessment & Plan       Ms. Quiroz is a 28 year old female with a high risk of breast cancer based on her Tyrer Cuzick score of 26.2%. Her genetic testing was negative.  I went over risk reduction and early detection strategies with her. (1) Early detection: twice yearly clinical breast exam. Yearly bilateral breast MRI and yearly bilateral mammogram so there is imaging every 6 months. (2) Prevention: First step of prevention is lifestyle modification with healthy diet, maintaining a healthy BMI, smoking cessation (including vaping), regular exercise, <2 alcoholic drinks per day. The second option on prevention is chemoprophylaxis with anti-hormone therapy. We discussed the [id number]% risk reduction with medication. I offered her a referral to medical oncology to discuss risks and benefits in detail and she elected to defer at this time. Third option is surgical prevention with bilateral prophylactic mastectomies with or without reconstruction. We discussed that this is a big operation and will require 1-2 months of recovery with risks of bleeding, infection, damage to surrounding structures. This will be a one night hospital stay. She understands that bilateral mastectomy does not decrease her risk to 0%. She has elected to proceed with bilateral mastectomies with reconstruction.        Sharita Chavira MD  08/15/23  12:58 CDT

## 2023-08-16 VITALS
HEART RATE: 79 BPM | RESPIRATION RATE: 18 BRPM | OXYGEN SATURATION: 100 % | TEMPERATURE: 98.4 F | DIASTOLIC BLOOD PRESSURE: 72 MMHG | SYSTOLIC BLOOD PRESSURE: 108 MMHG

## 2023-08-16 LAB
ANION GAP SERPL CALCULATED.3IONS-SCNC: 10 MMOL/L (ref 5–15)
BASOPHILS # BLD AUTO: 0.01 10*3/MM3 (ref 0–0.2)
BASOPHILS NFR BLD AUTO: 0.1 % (ref 0–1.5)
BUN SERPL-MCNC: 10 MG/DL (ref 6–20)
BUN/CREAT SERPL: 16.7 (ref 7–25)
CALCIUM SPEC-SCNC: 8.6 MG/DL (ref 8.6–10.5)
CHLORIDE SERPL-SCNC: 104 MMOL/L (ref 98–107)
CO2 SERPL-SCNC: 25 MMOL/L (ref 22–29)
CREAT SERPL-MCNC: 0.6 MG/DL (ref 0.57–1)
DEPRECATED RDW RBC AUTO: 39.8 FL (ref 37–54)
EGFRCR SERPLBLD CKD-EPI 2021: 124 ML/MIN/1.73
EOSINOPHIL # BLD AUTO: 0.01 10*3/MM3 (ref 0–0.4)
EOSINOPHIL NFR BLD AUTO: 0.1 % (ref 0.3–6.2)
ERYTHROCYTE [DISTWIDTH] IN BLOOD BY AUTOMATED COUNT: 12.4 % (ref 12.3–15.4)
GLUCOSE SERPL-MCNC: 96 MG/DL (ref 65–99)
HCT VFR BLD AUTO: 33.8 % (ref 34–46.6)
HGB BLD-MCNC: 10.3 G/DL (ref 12–15.9)
IMM GRANULOCYTES # BLD AUTO: 0.04 10*3/MM3 (ref 0–0.05)
IMM GRANULOCYTES NFR BLD AUTO: 0.4 % (ref 0–0.5)
LYMPHOCYTES # BLD AUTO: 1.92 10*3/MM3 (ref 0.7–3.1)
LYMPHOCYTES NFR BLD AUTO: 21 % (ref 19.6–45.3)
MCH RBC QN AUTO: 26.8 PG (ref 26.6–33)
MCHC RBC AUTO-ENTMCNC: 30.5 G/DL (ref 31.5–35.7)
MCV RBC AUTO: 88 FL (ref 79–97)
MONOCYTES # BLD AUTO: 0.7 10*3/MM3 (ref 0.1–0.9)
MONOCYTES NFR BLD AUTO: 7.7 % (ref 5–12)
NEUTROPHILS NFR BLD AUTO: 6.46 10*3/MM3 (ref 1.7–7)
NEUTROPHILS NFR BLD AUTO: 70.7 % (ref 42.7–76)
NRBC BLD AUTO-RTO: 0 /100 WBC (ref 0–0.2)
PLATELET # BLD AUTO: 174 10*3/MM3 (ref 140–450)
PMV BLD AUTO: 11.2 FL (ref 6–12)
POTASSIUM SERPL-SCNC: 4.1 MMOL/L (ref 3.5–5.2)
RBC # BLD AUTO: 3.84 10*6/MM3 (ref 3.77–5.28)
SODIUM SERPL-SCNC: 139 MMOL/L (ref 136–145)
WBC NRBC COR # BLD: 9.14 10*3/MM3 (ref 3.4–10.8)

## 2023-08-16 PROCEDURE — 25010000002 ENOXAPARIN PER 10 MG: Performed by: SURGERY

## 2023-08-16 PROCEDURE — 85025 COMPLETE CBC W/AUTO DIFF WBC: CPT | Performed by: SURGERY

## 2023-08-16 PROCEDURE — G0378 HOSPITAL OBSERVATION PER HR: HCPCS

## 2023-08-16 PROCEDURE — 25010000002 CEFAZOLIN PER 500 MG: Performed by: SURGERY

## 2023-08-16 PROCEDURE — 80048 BASIC METABOLIC PNL TOTAL CA: CPT | Performed by: SURGERY

## 2023-08-16 RX ORDER — CEPHALEXIN 500 MG/1
500 CAPSULE ORAL 2 TIMES DAILY
Qty: 20 CAPSULE | Refills: 0 | Status: SHIPPED | OUTPATIENT
Start: 2023-08-16 | End: 2023-08-26

## 2023-08-16 RX ORDER — NALOXONE HYDROCHLORIDE 4 MG/.1ML
SPRAY NASAL
Qty: 2 EACH | Refills: 0 | Status: SHIPPED | OUTPATIENT
Start: 2023-08-16

## 2023-08-16 RX ORDER — ONDANSETRON 4 MG/1
4 TABLET, FILM COATED ORAL EVERY 8 HOURS PRN
Qty: 6 TABLET | Refills: 0 | Status: SHIPPED | OUTPATIENT
Start: 2023-08-16

## 2023-08-16 RX ORDER — TRAMADOL HYDROCHLORIDE 50 MG/1
50 TABLET ORAL EVERY 6 HOURS PRN
Qty: 15 TABLET | Refills: 0 | Status: SHIPPED | OUTPATIENT
Start: 2023-08-16 | End: 2023-08-18

## 2023-08-16 RX ADMIN — TRAMADOL HYDROCHLORIDE 100 MG: 50 TABLET, COATED ORAL at 10:29

## 2023-08-16 RX ADMIN — TRAMADOL HYDROCHLORIDE 100 MG: 50 TABLET, COATED ORAL at 04:04

## 2023-08-16 RX ADMIN — SODIUM CHLORIDE 1000 MG: 900 INJECTION INTRAVENOUS at 06:01

## 2023-08-16 RX ADMIN — DOCUSATE SODIUM 100 MG: 100 CAPSULE, LIQUID FILLED ORAL at 08:25

## 2023-08-16 RX ADMIN — ENOXAPARIN SODIUM 40 MG: 100 INJECTION SUBCUTANEOUS at 08:25

## 2023-08-16 NOTE — DISCHARGE INSTRUCTIONS
Breast Surgery Postoperative Instructions  Juan J Mcneal MD    ACTIVITY:  Please do not lift more than 10 pounds.    Do not stretch behind your back.   Do not reach over your head.      CLINIC FOLLOW UP:  You will have scheduled follow-up in my clinic on 8/21/23.    Please be sure that this appointment time has been confirmed before you leave the hospital. If you have not been given an appointment time, please call my office to schedule an appointment.    MEDICATIONS:  Please take your prescription pain medication only as needed.    It is ok to take over-the-counter pain medications as well per package instructions.    Please wean off of your prescription pain medication as soon as possible.  If you have been prescribed antibiotics, please take them as instructed.    DRAIN CARE:  If you have drains in place, please follow the instructions provided by your nurse for drain care.  Please keep a very accurate record of your drain output for each individual drain.    Questions or Concerns    If you have additional questions or concerns, please contact [facility] at [phone] during or after office hours. After hours, you will have the option to press #1 to speak to the answering service.  They will be able to put you in touch with Dr. Mcneal if necessary.     In the case of an emergency, please call 911.    Signs and Symptoms of Infection and/or Complication:  Rapid or asymmetric swelling  Swelling that worsens after the first 48 hours  Redness and warmth developing on previously normal-appearing skin  Drainage other than scant blood or blood-tinged clear fluid  Fever or chills  Nausea and vomiting or diarrhea   Separation of the incision  Bleeding that does not stop with pressure    Signs and Symptoms of a Potential Emergency:    If you experience any of the following during your postoperative recovery, this may represent an emergency.  Please call 911 and seek immediate medical  "attention:    Loss of consciousness or "blacking out"  Worsening shortness of breath or inability to catch your breath  No onset chest, shoulder, or neck pain  Leg pain or swelling  Light-headedness or dizziness when attempting to stand or walk    "

## 2023-08-16 NOTE — PLAN OF CARE
Goal Outcome Evaluation:           Progress: improving   VSS, voiding and ambulating, po pain meds given and effective per pt, AMANDA drains bloody drainage, resting between care.

## 2023-08-16 NOTE — DISCHARGE SUMMARY
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 female admitted on 8/15/23 undergo bilateral skin sparing mastectomies with Dr. Chavira followed by immediate first stage breast reconstruction with tissue expander placement.  She was admitted postoperatively per standard of care.  She met discharge criteria on the morning after surgery and was discharged to home.  She has scheduled follow-up in my office on Monday, 8/21/23.   She was provided with written and verbal instructions regarding activity restrictions, support bra use, and drain care prior to discharge.    Procedures Performed  Procedure(s):  BILATERAL MASTECTOMY  BILATERAL FIRST STAGE BREAST RECONSTRUCTION WITH  TISSUE EXPANDER PLACEMENT       Consults:   Consults       No orders found for last 30 day(s).            Pertinent Test Results: Hgb 10.3    Condition on Discharge:  good      Physical Exam:     General Appearance:    Alert, cooperative, in no acute distress   Head:    Normocephalic, without obvious abnormality, atraumatic   Eyes:            Lids and lashes normal, conjunctivae and sclerae normal, no   icterus, no pallor, corneas clear, PERRLA   Ears:    Ears appear intact with no abnormalities noted   Throat:   No oral lesions, no thrush, oral mucosa moist   Neck:   No adenopathy, supple, trachea midline, no thyromegaly, no   carotid bruit, no JVD   Back:     No kyphosis present, no scoliosis present, no skin lesions,      erythema or scars, no tenderness to percussion or                   palpation,   range of motion normal   Lungs:     Clear to auscultation,respirations regular, even and                  unlabored    Heart:    Regular rhythm and normal rate, normal S1 and S2, no            murmur, no gallop, no  rub, no click     Chest Wall:    No abnormalities observed. No evidence of hematoma.  All visible in margins appear perfused and viable.  Drain sites clean and dry.     Abdomen:     Normal bowel sounds, no masses, no organomegaly, soft        non-tender, non-distended, no guarding, no rebound                tenderness   Rectal:     Deferred   Extremities:   Moves all extremities well, no edema, no cyanosis, no             redness   Pulses:   Pulses palpable and equal bilaterally   Skin:   No bleeding, bruising or rash   Lymph nodes:   No palpable adenopathy   Neurologic:   Cranial nerves 2 - 12 grossly intact, sensation intact, DTR       present and equal bilaterally       Discharge Disposition  Home or Self Care    Discharge Medications     Discharge Medications        New Medications        Instructions Start Date   cephalexin 500 MG capsule  Commonly known as: KEFLEX   500 mg, Oral, 2 Times Daily      naloxone 4 MG/0.1ML nasal spray  Commonly known as: NARCAN   Call 911. Don't prime. [place] in 1 nostril for overdose. Repeat in 2-3 minutes in other nostril if no or minimal breathing/responsiveness.      ondansetron 4 MG tablet  Commonly known as: Zofran   4 mg, Oral, Every 8 Hours PRN      traMADol 50 MG tablet  Commonly known as: Ultram   50 mg, Oral, Every 6 Hours PRN             Changes to Medications        Instructions Start Date   sertraline 50 MG tablet  Commonly known as: ZOLOFT  What changed:   how much to take  how to take this  when to take this   Take 75 mg daily for 1 week.  Then take 100 mg daily.             Continue These Medications        Instructions Start Date   clonazePAM 0.5 MG tablet  Commonly known as: KlonoPIN   0.5 mg, Oral, 2 Times Daily PRN      cyanocobalamin 1000 MCG/ML injection   INJECT 1 ML INTRAMUSCULARLY ONCE EVERY 2 WEEKS      methylPREDNISolone 4 MG dose pack  Commonly known as: MEDROL   Take as directed on package instructions.             ASK your doctor about these  medications        Instructions Start Date   colestipol 1 g tablet  Commonly known as: COLESTID   1 g, Oral, 4 Times Daily, Take 1x/day (as directed)      cyclobenzaprine 10 MG tablet  Commonly known as: FLEXERIL   10 mg, Oral, 3 Times Daily PRN      Vibegron 75 MG tablet   75 mg, Oral, Daily               Discharge Diet:   regular    Activity at Discharge:   See discharge instructions    Follow-up Appointments:    Dr. Mcneal, Monday, 8/21    Future Appointments   Date Time Provider Department Center   12/14/2023  1:00 PM Sharita Chavira MD [facility]         Test Results Pending at Discharge  Pending Labs       Order Current Status    Tissue Pathology Exam In process               Electronically signed by Juan J Mcneal MD, 08/16/23, 12:46 PM CDT.    Time: 30 minute

## 2023-08-16 NOTE — ANESTHESIA POSTPROCEDURE EVALUATION
Patient: Rena Quiroz    Procedure Summary       Date: 08/15/23 Room / Location:  [facility] OR 06 /  [facility] OR    Anesthesia Start: 1314 Anesthesia Stop: 1628    Procedures:       BILATERAL MASTECTOMY (Bilateral: Breast)      BILATERAL FIRST STAGE BREAST RECONSTRUCTION WITH  TISSUE EXPANDER PLACEMENT (Bilateral: Breast) Diagnosis:       Increased risk of breast cancer      (Increased risk of breast cancer [Z91.89])    Surgeons: Sharita Chavira MD; Juan J Mcneal MD Provider: Get Kimball CRNA    Anesthesia Type: general with block ASA Status: 2            Anesthesia Type: general with block    Vitals  Vitals Value Taken Time   /67 08/15/23 1830   Temp 97.8 øF (36.6 øC) 08/15/23 1830   Pulse 76 08/15/23 1830   Resp 16 08/15/23 1830   SpO2 100 % 08/15/23 1830           Post Anesthesia Care and Evaluation    Patient location during evaluation: PACU  Patient participation: complete - patient participated  Level of consciousness: awake and alert  Pain management: adequate    Airway patency: patent  Anesthetic complications: No anesthetic complications    Cardiovascular status: acceptable  Respiratory status: acceptable  Hydration status: acceptable    Comments: Blood pressure 108/72, pulse 79, temperature 98.4 øF (36.9 øC), temperature source Oral, resp. rate 18, SpO2 100 %.    Pt discharged from PACU based on angie score >8

## 2023-08-17 ENCOUNTER — READMISSION MANAGEMENT (OUTPATIENT)
Dept: CALL CENTER | Facility: HOSPITAL | Age: 28
End: 2023-08-17
Payer: OTHER GOVERNMENT

## 2023-08-17 ENCOUNTER — TRANSITIONAL CARE MANAGEMENT TELEPHONE ENCOUNTER (OUTPATIENT)
Dept: CALL CENTER | Facility: HOSPITAL | Age: 28
End: 2023-08-17
Payer: OTHER GOVERNMENT

## 2023-08-17 LAB
CYTO UR: NORMAL
LAB AP CASE REPORT: NORMAL
Lab: NORMAL
PATH REPORT.FINAL DX SPEC: NORMAL
PATH REPORT.GROSS SPEC: NORMAL

## 2023-08-17 NOTE — OUTREACH NOTE
Call Center TCM Note      Flowsheet Row Responses   [facility] patient discharged from? [place]   Does the patient have one of the following disease processes/diagnoses(primary or secondary)? General Surgery   TCM attempt successful? Yes   Call start time 1158   Call end time 1204   Discharge diagnosis mastectomy   Meds reviewed with patient/caregiver? Yes   Is the patient having any side effects they believe may be caused by any medication additions or changes? No   Does the patient have all medications related to this admission filled (includes all antibiotics, pain medications, etc.) Yes   Is the patient taking all medications as directed (includes completed medication regime)? Yes   Does the patient have an appointment with their PCP within 7-14 days of discharge? No   Nursing Interventions Assisted patient with making appointment per protocol   Has home health visited the patient within 72 hours of discharge? N/A   Psychosocial issues? No   Did the patient receive a copy of their discharge instructions? Yes   Nursing interventions Reviewed instructions with patient   What is the patient's perception of their health status since discharge? Improving   Is the patient /caregiver able to teach back basic post-op care? Drive as instructed by MD in discharge instructions, Take showers only when approved by MD-sponge bathe until then, Keep incision areas clean,dry and protected, Lifting as instructed by MD in discharge instructions   Is the patient/caregiver able to teach back signs and symptoms of incisional infection? Increased redness, swelling or pain at the incisonal site, Increased drainage or bleeding, Incisional warmth, Pus or odor from incision, Fever   Is the patient/caregiver able to teach back steps to recovery at home? Set small, achievable goals for return to baseline health, Rest and rebuild strength, gradually increase activity, Make a list of questions for surgeon's appointment   If the patient  is a current smoker, are they able to teach back resources for cessation? Not a smoker   Is the patient/caregiver able to teach back the hierarchy of who to call/visit for symptoms/problems? PCP, Specialist, Home health nurse, Urgent Care, ED, 911 Yes   TCM call completed? Yes   Call end time 1204   Would this patient benefit from a Referral to [facility] Social Work? No   Is the patient interested in additional calls from an ambulatory ? No            Farzana Lunsford LPN    8/17/2023, 12:06 CDT

## 2023-08-17 NOTE — OUTREACH NOTE
Prep Survey      Flowsheet Row Responses   [facility] facility patient discharged from? [place]   Is LACE score < 7 ? Yes   Eligibility [facility]   Date of Admission 08/15/23   Date of Discharge 08/16/23   Discharge Disposition Home or Self Care   Discharge diagnosis mastectomy   Does the patient have one of the following disease processes/diagnoses(primary or secondary)? General Surgery   Does the patient have Home health ordered? No   Is there a DME ordered? No   Comments regarding appointments call for apmt   Prep survey completed? Yes            Emily JARAMILLO - Registered Nurse

## 2023-08-18 ENCOUNTER — OFFICE VISIT (OUTPATIENT)
Dept: FAMILY MEDICINE CLINIC | Facility: CLINIC | Age: 28
End: 2023-08-18
Payer: OTHER GOVERNMENT

## 2023-08-18 ENCOUNTER — TELEPHONE (OUTPATIENT)
Dept: SURGERY | Facility: CLINIC | Age: 28
End: 2023-08-18

## 2023-08-18 VITALS
HEIGHT: 69 IN | BODY MASS INDEX: 26.36 KG/M2 | SYSTOLIC BLOOD PRESSURE: 107 MMHG | RESPIRATION RATE: 18 BRPM | WEIGHT: 178 LBS | TEMPERATURE: 98 F | DIASTOLIC BLOOD PRESSURE: 77 MMHG | OXYGEN SATURATION: 99 % | HEART RATE: 69 BPM

## 2023-08-18 DIAGNOSIS — Z92.89 HISTORY OF RECENT HOSPITALIZATION: Primary | ICD-10-CM

## 2023-08-18 DIAGNOSIS — N64.4 BREAST PAIN: ICD-10-CM

## 2023-08-18 DIAGNOSIS — Z90.13 ACQUIRED ABSENCE OF BREAST AND ABSENT NIPPLE, BILATERAL: ICD-10-CM

## 2023-08-18 RX ORDER — TRAMADOL HYDROCHLORIDE 100 MG/1
50-100 TABLET, COATED ORAL EVERY 6 HOURS PRN
Qty: 12 TABLET | Refills: 0 | Status: SHIPPED | OUTPATIENT
Start: 2023-08-18 | End: 2023-08-21

## 2023-08-18 RX ORDER — GABAPENTIN 300 MG/1
CAPSULE ORAL
COMMUNITY
Start: 2023-08-17

## 2023-08-18 NOTE — TELEPHONE ENCOUNTER
Caller: TERRI TOUSSAINT    Relationship: SELF    Best call back number: [phone]    What is the best time to reach you: ANY    Who are you requesting to speak with (clinical staff, provider,  specific staff member): NON    Do you know the name of the person who called: NA    What was the call regarding: PT CALLED TO [facility] POST OP FOR 8-21-23. PT HAD BILATERAL MASTECTOMY & BILATERAL FIRST STAGE BREAST RECONSTRUCTION WITH TISSUE EXPANDER PLACEMENT ON 8-15-23. THERE IS NO AVL UNTIL 9-20-23       Is it okay if the provider responds through MyChart: NO      SENDING TE AS PHONES ARE RING FAST BUSY.

## 2023-08-18 NOTE — PROGRESS NOTES
Transitional Care Follow Up Visit   [unfilled]         Subjective     Chief Complaint   Patient presents with    Hospital Follow Up Visit       History of Present Illness  Rena Quiroz is a 28 y.o. female who presents for a transitional care management visit.    Within 48 business hours after discharge our office contacted her via telephone to coordinate her care and needs.      I reviewed and discussed the details of that call along with the discharge summary, hospital problems, inpatient lab results, inpatient diagnostic studies, and consultation reports with Rena.     Current outpatient and discharge medications have been reconciled for the patient.  Reviewed by: MEGHANA Ayers          8/17/2023    12:57 AM   Date of TCM Phone Call   [facility] in [place]   Date of Admission 8/15/2023   Date of Discharge 8/16/2023   Discharge Disposition Home or Self Care     Risk for Readmission (LACE) Score: 1 (8/16/2023  5:00 AM)    Course During Hospital Stay:  [facility] undergo bilateral skin sparing mastectomies with immediate first stage breast reconstruction with tissue expander placement.  She stayed overnight for observation.  She was given tramadol for pain and zofran for nausea and tolerated it well.  She was discharged on 8/16/23 with a prescription for cephalexin, zofran, and tramadol.  She was discharged with a breast binder and four drains and was instructed in postop care.  She is to followup with her surgeon on 8/21/23.    Patient states that she is in a great deal of pain.  She has taken pain medication as prescribed but postop pain is10/10, constant, sore, aching.    Patient's PMR from outside medical facility reviewed and noted.    Review of Systems     Otherwise complete ROS reviewed and negative except as mentioned in the HPI.    Past Medical History:   Past Medical History:   Diagnosis Date    Abnormal Pap smear of cervix 2018    Anemia      Anxiety     Depression     Headache     Obesity     Polycystic ovary syndrome     PONV (postoperative nausea and vomiting)     Prediabetes      Past Surgical History:  Past Surgical History:   Procedure Laterality Date    BARIATRIC SURGERY      BREAST RECONSTRUCTION, BREAST TISSUE EXPANDER INSERTION Bilateral 8/15/2023    Procedure: BILATERAL FIRST STAGE BREAST RECONSTRUCTION WITH  TISSUE EXPANDER PLACEMENT;  Surgeon: Juan J Mcneal MD;  Location: [facility] OR;  Service: Plastics;  Laterality: Bilateral;     SECTION      tubal in addition    CHOLECYSTECTOMY      COLONOSCOPY N/A 2022    Procedure: COLONOSCOPY WITH ANESTHESIA;  Surgeon: Prashanth Isaac DO;  Location: [facility] ENDOSCOPY;  Service: Gastroenterology;  Laterality: N/A;  pre op: alt bowel fxn   post op:polyps  PCP: Nicol Paula APRN    COSMETIC SURGERY      tummy tuck    HYSTERECTOMY      MASTECTOMY Bilateral 8/15/2023    Procedure: BILATERAL MASTECTOMY;  Surgeon: Sharita Chavira MD;  Location: [facility] OR;  Service: General;  Laterality: Bilateral;    TONSILLECTOMY      VAGINAL HYSTERECTOMY  2021    Lap/vag assist due to menorrhagia     Social History:  reports that she has never smoked. She has never used smokeless tobacco. She reports that she does not drink alcohol and does not use drugs.    Family History: family history includes Autism in her son; Breast cancer in her maternal grandmother and paternal grandmother; Breast cancer (age of onset: 41) in her mother; Cancer in her mother; Colon cancer in her paternal grandfather; Heart disease in her father; Hyperlipidemia in her father; Hypertension in her father.      Allergies:  Allergies   Allergen Reactions    Nsaids Other (See Comments)     Previous weight loss surgery     Medications:  Prior to Admission medications    Medication Sig Start Date End Date Taking? Authorizing Provider   cephalexin (KEFLEX) 500 MG capsule Take 1 capsule by mouth 2 (Two) Times a  "Day for 10 days. 8/16/23 8/26/23 Yes Juan J Mcneal MD   clonazePAM (KlonoPIN) 0.5 MG tablet Take 1 tablet by mouth 2 (Two) Times a Day As Needed for Anxiety. 2/1/23  Yes Nicol Paula APRN   colestipol (COLESTID) 1 g tablet Take 1 tablet by mouth 4 (Four) Times a Day. Take 1x/day (as directed) 1/10/23  Yes Prashanth Isaac W,    cyanocobalamin 1000 MCG/ML injection INJECT 1 ML INTRAMUSCULARLY ONCE EVERY 2 WEEKS 5/2/23  Yes Nicol Paula APRN   cyclobenzaprine (FLEXERIL) 10 MG tablet Take 1 tablet by mouth 3 (Three) Times a Day As Needed for Muscle Spasms. 3/14/23  Yes Nicol Paula APRN   gabapentin (NEURONTIN) 300 MG capsule  8/17/23  Yes ProviderAmarjit MD   methylPREDNISolone (MEDROL) 4 MG dose pack Take as directed on package instructions. 7/31/23  Yes Nicol Paula APRN   naloxone (NARCAN) 4 MG/0.1ML nasal spray Call 911. Don't prime. [place] in 1 nostril for overdose. Repeat in 2-3 minutes in other nostril if no or minimal breathing/responsiveness. 8/16/23  Yes Juan J Mcneal MD   ondansetron (Zofran) 4 MG tablet Take 1 tablet by mouth Every 8 (Eight) Hours As Needed for Nausea or Vomiting. 8/16/23  Yes Juan J Mcneal MD   sertraline (ZOLOFT) 50 MG tablet Take 75 mg daily for 1 week.  Then take 100 mg daily.  Patient taking differently: Take 1.5 tablets by mouth Daily. Take 75 mg daily for 1 week.  Then take 100 mg daily. 2/15/23  Yes Nicol Paula APRN   traMADol (Ultram) 50 MG tablet Take 1 tablet by mouth Every 6 (Six) Hours As Needed for Moderate Pain or Severe Pain for up to 15 doses. 8/16/23  Yes Juan J Mcneal MD   Vibegron 75 MG tablet Take 1 tablet by mouth Daily. 3/28/23  Yes Nicol Paula APRN         Objective     Vital Signs: /77 (BP Location: Left arm, Patient Position: Sitting, Cuff Size: Adult)   Pulse 69   Temp 98 øF (36.7 øC) (Infrared)   Resp 18   Ht 174 cm (68.5\")   Wt 80.7 kg (178 lb)  "  SpO2 99%   BMI 26.67 kg/mý   Physical Exam  Vitals and nursing note reviewed.   Constitutional:       Appearance: Normal appearance.   HENT:      Head: Normocephalic.      Nose: Nose normal.      Mouth/Throat:      Mouth: Mucous membranes are moist.   Eyes:      Conjunctiva/sclera: Conjunctivae normal.   Cardiovascular:      Rate and Rhythm: Normal rate and regular rhythm.      Pulses: Normal pulses.      Heart sounds: Normal heart sounds.   Pulmonary:      Effort: Pulmonary effort is normal.      Breath sounds: Normal breath sounds.   Chest:      Chest wall: Tenderness present.      Comments: Incisions clean, dry, and intact.  Postoperative changes to breast, nipple removal, tissue expanders present.  Patient wearing binder.   Musculoskeletal:         General: Normal range of motion.      Cervical back: Normal range of motion.   Skin:     General: Skin is warm and dry.   Neurological:      General: No focal deficit present.      Mental Status: She is alert and oriented to person, place, and time.   Psychiatric:         Mood and Affect: Mood normal.         Behavior: Behavior normal.            Results Reviewed:  Glucose   Date Value Ref Range Status   08/16/2023 96 65 - 99 mg/dL Final     BUN   Date Value Ref Range Status   08/16/2023 10 6 - 20 mg/dL Final     Creatinine   Date Value Ref Range Status   08/16/2023 0.60 0.57 - 1.00 mg/dL Final   06/02/2023 0.60 0.60 - 1.30 mg/dL Final     Comment:     Serial Number: [id number]:  [id number]     Sodium   Date Value Ref Range Status   08/16/2023 139 136 - 145 mmol/L Final     Potassium   Date Value Ref Range Status   08/16/2023 4.1 3.5 - 5.2 mmol/L Final     Chloride   Date Value Ref Range Status   08/16/2023 104 98 - 107 mmol/L Final     CO2   Date Value Ref Range Status   08/16/2023 25.0 22.0 - 29.0 mmol/L Final     Calcium   Date Value Ref Range Status   08/16/2023 8.6 8.6 - 10.5 mg/dL Final     ALT (SGPT)   Date Value Ref Range Status   08/08/2023 15 1 - 33 U/L  Final     AST (SGOT)   Date Value Ref Range Status   08/08/2023 15 1 - 32 U/L Final     WBC   Date Value Ref Range Status   08/16/2023 9.14 3.40 - 10.80 10*3/mm3 Final   12/05/2022 4.1 3.4 - 10.8 x10E3/uL Final     Hematocrit   Date Value Ref Range Status   08/16/2023 33.8 (L) 34.0 - 46.6 % Final     Platelets   Date Value Ref Range Status   08/16/2023 174 140 - 450 10*3/mm3 Final     Triglycerides   Date Value Ref Range Status   12/05/2022 37 0 - 149 mg/dL Final     HDL Cholesterol   Date Value Ref Range Status   12/05/2022 63 >39 mg/dL Final     LDL Chol Calc (NIH)   Date Value Ref Range Status   12/05/2022 65 0 - 99 mg/dL Final     Hemoglobin A1C   Date Value Ref Range Status   12/05/2022 5.2 4.8 - 5.6 % Final     Comment:              Prediabetes: 5.7 - 6.4           Diabetes: >6.4           Glycemic control for adults with diabetes: <7.0           Assessment / Plan     Assessment/Plan     Diagnoses and all orders for this visit:    1. History of recent hospitalization (Primary)    2. Acquired absence of breast and absent nipple, bilateral  -     Discontinue: traMADol HCl (ULTRAM) 100 MG tablet; Take 0.5-1 tablets by mouth Every 6 (Six) Hours As Needed (moderate pain) for up to 3 days.  Dispense: 12 tablet; Refill: 0    3. Breast pain  -     Discontinue: traMADol HCl (ULTRAM) 100 MG tablet; Take 0.5-1 tablets by mouth Every 6 (Six) Hours As Needed (moderate pain) for up to 3 days.  Dispense: 12 tablet; Refill: 0         An After Visit Summary was printed and given to the patient at discharge.  Return in about 1 month (around 9/18/2023) for Recheck post mastectomy recovery .    I have discussed the patient results/orders and plan/recommendation with them at today's visit.      Nicol Paula, MEGHANA   08/18/2023

## 2023-08-21 ENCOUNTER — TELEPHONE (OUTPATIENT)
Dept: FAMILY MEDICINE CLINIC | Facility: CLINIC | Age: 28
End: 2023-08-21
Payer: OTHER GOVERNMENT

## 2023-08-21 ENCOUNTER — OFFICE VISIT (OUTPATIENT)
Dept: SURGERY | Facility: CLINIC | Age: 28
End: 2023-08-21
Payer: OTHER GOVERNMENT

## 2023-08-21 VITALS
BODY MASS INDEX: 26.35 KG/M2 | SYSTOLIC BLOOD PRESSURE: 107 MMHG | HEIGHT: 69 IN | DIASTOLIC BLOOD PRESSURE: 77 MMHG | WEIGHT: 177.91 LBS | HEART RATE: 69 BPM

## 2023-08-21 DIAGNOSIS — Z90.13 ACQUIRED ABSENCE OF BREAST AND ABSENT NIPPLE, BILATERAL: ICD-10-CM

## 2023-08-21 DIAGNOSIS — Z91.89 INCREASED RISK OF BREAST CANCER: ICD-10-CM

## 2023-08-21 DIAGNOSIS — N64.4 BREAST PAIN: Primary | ICD-10-CM

## 2023-08-21 DIAGNOSIS — Z90.13 S/P BILATERAL MASTECTOMY: Primary | ICD-10-CM

## 2023-08-21 DIAGNOSIS — Z80.3 FAMILY HISTORY OF BREAST CANCER: ICD-10-CM

## 2023-08-21 DIAGNOSIS — E66.3 OVERWEIGHT (BMI 25.0-29.9): ICD-10-CM

## 2023-08-21 PROCEDURE — 99024 POSTOP FOLLOW-UP VISIT: CPT | Performed by: STUDENT IN AN ORGANIZED HEALTH CARE EDUCATION/TRAINING PROGRAM

## 2023-08-21 RX ORDER — TRAMADOL HYDROCHLORIDE 50 MG/1
50 TABLET ORAL 2 TIMES DAILY
Qty: 14 TABLET | Refills: 0 | Status: SHIPPED | OUTPATIENT
Start: 2023-08-21

## 2023-08-21 NOTE — PROGRESS NOTES
"Patient: Rena Quiroz    YOB: 1995    Date: 08/21/2023    Primary Care Provider: Nicol Paula APRN    Vital Signs:   Vitals:    08/21/23 1450   BP: 107/77   BP Location: Left arm   Patient Position: Sitting   Cuff Size: Adult   Pulse: 69   Weight: 80.7 kg (177 lb 14.6 oz)   Height: 174 cm (68.5\")       The patient is tolerating a regular diet and has no complaints s/p bilateral mastectomies with reconstruction (co-case with Dr. Mcneal). The patient denies fevers, chills, nausea, vomiting, and excessive pain. Her incisions are well healed. No signs of infection.      Results Review:   I reviewed the patient's new clinical results.  Tissue Pathology Exam (08/15/2023 13:49)   1.  Left breast, mastectomy:  Benign breast with scattered fibrocystic change.  No evidence of atypical hyperplasia, in situ carcinoma, or invasive carcinoma.     2.  Right breast, mastectomy:  Benign breast with scattered fibrocystic change.  No evidence of atypical hyperplasia, in situ carcinoma, or invasive carcinoma.    Assessment / Plan:    Diagnoses and all orders for this visit:    1. S/P bilateral mastectomy (Primary)    2. Increased risk of breast cancer    3. Family history of breast cancer    4. Overweight (BMI 25.0-29.9)      She is doing well post op. Pathology reviewed. Follow up in 6 months. Follow up with Dr. Mcneal today as scheduled.     Electronically signed by Sharita Chavira MD  08/21/23  14:55 CDT                  "

## 2023-08-21 NOTE — PATIENT INSTRUCTIONS

## 2023-08-21 NOTE — TELEPHONE ENCOUNTER
Patient called and stated that she spoke with her insurance and they stated that the prescription needs to be written as 50 mg 2x a day in order for them to approve it. Patient is in a lot of pain from her double mastectomy and has not had pain meds all weekend.

## 2023-11-29 ENCOUNTER — PRE-ADMISSION TESTING (OUTPATIENT)
Dept: PREADMISSION TESTING | Facility: HOSPITAL | Age: 28
End: 2023-11-29
Payer: OTHER GOVERNMENT

## 2023-11-29 VITALS
HEIGHT: 69 IN | OXYGEN SATURATION: 100 % | HEART RATE: 70 BPM | RESPIRATION RATE: 16 BRPM | BODY MASS INDEX: 25.93 KG/M2 | WEIGHT: 175.04 LBS | SYSTOLIC BLOOD PRESSURE: 109 MMHG | DIASTOLIC BLOOD PRESSURE: 69 MMHG

## 2023-11-29 LAB
DEPRECATED RDW RBC AUTO: 41 FL (ref 37–54)
ERYTHROCYTE [DISTWIDTH] IN BLOOD BY AUTOMATED COUNT: 12.7 % (ref 12.3–15.4)
HCT VFR BLD AUTO: 41.7 % (ref 34–46.6)
HGB BLD-MCNC: 12.9 G/DL (ref 12–15.9)
MCH RBC QN AUTO: 27.2 PG (ref 26.6–33)
MCHC RBC AUTO-ENTMCNC: 30.9 G/DL (ref 31.5–35.7)
MCV RBC AUTO: 87.8 FL (ref 79–97)
PLATELET # BLD AUTO: 189 10*3/MM3 (ref 140–450)
PMV BLD AUTO: 11.5 FL (ref 6–12)
RBC # BLD AUTO: 4.75 10*6/MM3 (ref 3.77–5.28)
WBC NRBC COR # BLD AUTO: 5.93 10*3/MM3 (ref 3.4–10.8)

## 2023-11-29 PROCEDURE — 85027 COMPLETE CBC AUTOMATED: CPT

## 2023-11-29 PROCEDURE — 36415 COLL VENOUS BLD VENIPUNCTURE: CPT

## 2023-11-29 NOTE — DISCHARGE INSTRUCTIONS
Before you come to the hospital        Arrival time: AS DIRECTED BY OFFICE     YOU MAY TAKE THE FOLLOWING MEDICATION(S) THE MORNING OF SURGERY WITH A SIP OF WATER: KLONOPIN, GABAPENTIN, ULTRAM           ALL OTHER HOME MEDICATION CHECK WITH YOUR PHYSICIAN (especially if   you are taking diabetes medicines or blood thinners)          If you were given and instructed to use a germ- killing soap, use as directed the night before surgery and again the morning of surgery or as directed by your surgeon. (Use one-half of the bottle with each shower.)   See attached information for How to Use Chlorhexidine for Bathing if applicable.            Eating and drinking restrictions prior to scheduled arrival time    2 Hours before arrival time STOP   Drinking Clear liquids (water, black coffee-NO CREAM,  apple juice-no pulp)    Clear Liquids    Water and flavored water                                                                      Clear Fruit juices, such as cranberry juice and apple juice.  Black coffee (NO cream of any kind, including powdered).  Plain tea  Clear bouillon or broth.  Flavored gelatin.  Soda.  Gatorade or Powerade.    8 Hours before arrival time STOP   All food, full liquids, and dairy products  Full liquid examples  Juices that have pulp.  Frozen ice pops that contain fruit pieces.  Coffee with creamer  Milk.  Yogurt.    (It is extremely important that you follow these guidelines to prevent delay or cancelation of your procedure)                       MANAGING PAIN AFTER SURGERY    We know you are probably wondering what your pain will be like after surgery.  Following surgery it is unrealistic to expect you will not have pain.   Pain is how our bodies let us know that something is wrong or cautions us to be careful.  That said, our goal is to make your pain tolerable.    Methods we may use to treat your pain include (oral or IV medications, PCAs, epidurals, nerve blocks, etc.)   While some procedures  require IV pain medications for a short time after surgery, transitioning to pain medications by mouth allows for better management of pain.   Your nurse will encourage you to take oral pain medications whenever possible.  IV medications work almost immediately, but only last a short while.  Taking medications by mouth allows for a more constant level of medication in your blood stream for a longer period of time.      Once your pain is out of control it is harder to get back under control.  It is important you are aware when your next dose of pain medication is due.  If you are admitted, your nurse may write the time of your next dose on the white board in your room to help you remember.      We are interested in your pain and encourage you to inform us about aggravating factors during your visit.   Many times a simple repositioning every few hours can make a big difference.    If your physician says it is okay, do not let your pain prevent you from getting out of bed. Be sure to call your nurse for assistance prior to getting up so you do not fall.      Before surgery, please decide your tolerable pain goal.  These faces help describe the pain ratings we use on a 0-10 scale.   Be prepared to tell us your goal and whether or not you take pain or anxiety medications at home.          Preparing for Surgery  Preparing for surgery is an important part of your care. It can make things go more smoothly and help you avoid complications. The steps leading up to surgery may vary among hospitals. Follow all instructions given to you by your health care providers. Ask questions if you do not understand something. Talk about any concerns that you have.  Here are some questions to consider asking before your surgery:  If my surgery is not an emergency (is elective), when would be the best time to have the surgery?  What arrangements do I need to make for work, home, or school?  What will my recovery be like? How long will it be  before I can return to normal activities?  Will I need to prepare my home? Will I need to arrange care for me or my children?  Should I expect to have pain after surgery? What are my pain management options? Are there nonmedical options that I can try for pain?  Tell a health care provider about:  Any allergies you have.  All medicines you are taking, including vitamins, herbs, eye drops, creams, and over-the-counter medicines.  Any problems you or family members have had with anesthetic medicines.  Any blood disorders you have.  Any surgeries you have had.  Any medical conditions you have.  Whether you are pregnant or may be pregnant.  What are the risks?  The risks and complications of surgery depend on the specific procedure that you have. Discuss all the risks with your health care providers before your surgery. Ask about common surgical complications, which may include:  Infection.  Bleeding or a need for blood replacement (transfusion).  Allergic reactions to medicines.  Damage to surrounding nerves, tissues, or structures.  A blood clot.  Scarring.  Failure of the surgery to correct the problem.  Follow these instructions before the procedure:  Several days or weeks before your procedure  You may have a physical exam by your primary health care provider to make sure it is safe for you to have surgery.  You may have testing. This may include a chest X-ray, blood and urine tests, electrocardiogram (ECG), or other testing.  Ask your health care provider about:  Changing or stopping your regular medicines. This is especially important if you are taking diabetes medicines or blood thinners.  Taking medicines such as aspirin and ibuprofen. These medicines can thin your blood. Do not take these medicines unless your health care provider tells you to take them.  Taking over-the-counter medicines, vitamins, herbs, and supplements.  Do not use any products that contain nicotine or tobacco, such as cigarettes and  e-cigarettes. If you need help quitting, ask your health care provider.  Avoid alcohol.  Ask your health care provider if there are exercises you can do to prepare for surgery.  Eat a healthy diet.   Plan to have someone 18 years of age or older to take you home from the hospital. We will need to verify your ride on the morning of surgery if you are being discharged home on the same day. Tell your ride to be expecting a call from the hospital prior to your procedure.   Plan to have a responsible adult care for you for at least 24 hours after you leave the hospital or clinic. This is important.  The day before your procedure  You may be given antibiotic medicine to take by mouth to help prevent infection. Take it as told by your health care provider.  You may be asked to shower with a germ-killing soap.  Follow instructions from your health care provider about eating and drinking restrictions. This includes gum, mints and hard candy.  Pack comfortable clothes according to your procedure.   The day of your procedure  You may need to take another shower with a germ-killing soap before you leave home in the morning.  With a small sip of water, take only the medicines that you are told to take.  Remove all jewelry including rings.   Leave anything you consider valuable at home except hearing aids if needed.  You do not need to bring your home medications into the hospital.   Do not wear any makeup, nail polish, powder, deodorant, lotion, hair accessories, or anything on your skin or body except your clothes.  If you will be staying in the hospital, bring a case to hold your glasses, contacts, or dentures. You may also want to bring your robe and non-skid footwear.       (Do not use denture adhesives since you will be asked to remove them during  surgery).   If you wear oxygen at home, bring it with you the day of surgery.  If instructed by your health care provider, bring your sleep apnea device with you on the day of  your surgery (if this applies to you).  You may want to leave your suitcase and sleep apnea device in the car until after surgery.   Arrive at the hospital as scheduled.  Bring a friend or family member with you who can help to answer questions and be present while you meet with your health care provider.  At the hospital  When you arrive at the hospital:  Go to registration located at the main entrance of the hospital. You will be registered and given a beeper and a sticker sheet. Take the stickers to the Outpatient nurses desk and place in the black tray. This is to notify staff that you have arrived. Then return to the lobby to wait.   When your beeper lights up and vibrates proceed through the double doors, under the stairs, and a member of the Outpatient Surgery staff will escort you to your preoperative room.  You may have to wear compression sleeves. These help to prevent blood clots and reduce swelling in your legs.  An IV may be inserted into one of your veins.              In the operating room, you may be given one or more of the following:        A medicine to help you relax (sedative).        A medicine to numb the area (local anesthetic).        A medicine to make you fall asleep (general anesthetic).        A medicine that is injected into an area of your body to numb everything below the                      injection site (regional anesthetic).  You may be given an antibiotic through your IV to help prevent infection.  Your surgical site will be marked or identified.    Contact a health care provider if you:  Develop a fever of more than 100.4°F (38°C) or other feelings of illness during the 48 hours before your surgery.  Have symptoms that get worse.  Have questions or concerns about your surgery.  Summary  Preparing for surgery can make the procedure go more smoothly and lower your risk of complications.  Before surgery, make a list of questions and concerns to discuss with your surgeon. Ask about  the risks and possible complications.  In the days or weeks before your surgery, follow all instructions from your health care provider. You may need to stop smoking, avoid alcohol, follow eating restrictions, and change or stop your regular medicines.  Contact your surgeon if you develop a fever or other signs of illness during the few days before your surgery.  This information is not intended to replace advice given to you by your health care provider. Make sure you discuss any questions you have with your health care provider.  Document Revised: 12/21/2018 Document Reviewed: 10/23/2018  [employer] Patient Education © 2021 [employer]         How to Use Chlorhexidine Before Surgery  Chlorhexidine gluconate (CHG) is a germ-killing (antiseptic) solution that is used to clean the skin. It can get rid of the bacteria that normally live on the skin and can keep them away for about 24 hours. To clean your skin with CHG, you may be given:  A CHG solution to use in the shower or as part of a sponge bath.  A prepackaged cloth that contains CHG.  Cleaning your skin with CHG may help lower the risk for infection:  While you are staying in the intensive care unit of the hospital.  If you have a vascular access, such as a central line, to provide short-term or long-term access to your veins.  If you have a catheter to drain urine from your bladder.  If you are on a ventilator. A ventilator is a machine that helps you breathe by moving air in and out of your lungs.  After surgery.  What are the risks?  Risks of using CHG include:  A skin reaction.  Hearing loss, if CHG gets in your ears and you have a perforated eardrum.  Eye injury, if CHG gets in your eyes and is not rinsed out.  The CHG product catching fire.  Make sure that you avoid smoking and flames after applying CHG to your skin.  Do not use CHG:  If you have a chlorhexidine allergy or have previously reacted to chlorhexidine.  On babies younger than 2 months of  age.  How to use CHG solution  Use CHG only as told by your health care provider, and follow the instructions on the label.  Use the full amount of CHG as directed. Usually, this is one bottle.  During a shower    Follow these steps when using CHG solution during a shower (unless your health care provider gives you different instructions):  Start the shower.  Use your normal soap and shampoo to wash your face and hair.  Turn off the shower or move out of the shower stream.  Pour the CHG onto a clean washcloth. Do not use any type of brush or rough-edged sponge.  Starting at your neck, lather your body down to your toes. Make sure you follow these instructions:  If you will be having surgery, pay special attention to the part of your body where you will be having surgery. Scrub this area for at least 1 minute.  Do not use CHG on your head or face. If the solution gets into your ears or eyes, rinse them well with water.  Avoid your genital area.  Avoid any areas of skin that have broken skin, cuts, or scrapes.  Scrub your back and under your arms. Make sure to wash skin folds.  Let the lather sit on your skin for 1-2 minutes or as long as told by your health care provider.  Thoroughly rinse your entire body in the shower. Make sure that all body creases and crevices are rinsed well.  Dry off with a clean towel. Do not put any substances on your body afterward--such as powder, lotion, or perfume--unless you are told to do so by your health care provider. Only use lotions that are recommended by the .  Put on clean clothes or pajamas.  If it is the night before your surgery, sleep in clean sheets.     During a sponge bath  Follow these steps when using CHG solution during a sponge bath (unless your health care provider gives you different instructions):  Use your normal soap and shampoo to wash your face and hair.  Pour the CHG onto a clean washcloth.  Starting at your neck, lather your body down to your  toes. Make sure you follow these instructions:  If you will be having surgery, pay special attention to the part of your body where you will be having surgery. Scrub this area for at least 1 minute.  Do not use CHG on your head or face. If the solution gets into your ears or eyes, rinse them well with water.  Avoid your genital area.  Avoid any areas of skin that have broken skin, cuts, or scrapes.  Scrub your back and under your arms. Make sure to wash skin folds.  Let the lather sit on your skin for 1-2 minutes or as long as told by your health care provider.  Using a different clean, wet washcloth, thoroughly rinse your entire body. Make sure that all body creases and crevices are rinsed well.  Dry off with a clean towel. Do not put any substances on your body afterward--such as powder, lotion, or perfume--unless you are told to do so by your health care provider. Only use lotions that are recommended by the .  Put on clean clothes or pajamas.  If it is the night before your surgery, sleep in clean sheets.  How to use CHG prepackaged cloths  Only use CHG cloths as told by your health care provider, and follow the instructions on the label.  Use the CHG cloth on clean, dry skin.  Do not use the CHG cloth on your head or face unless your health care provider tells you to.  When washing with the CHG cloth:  Avoid your genital area.  Avoid any areas of skin that have broken skin, cuts, or scrapes.  Before surgery    Follow these steps when using a CHG cloth to clean before surgery (unless your health care provider gives you different instructions):  Using the CHG cloth, vigorously scrub the part of your body where you will be having surgery. Scrub using a back-and-forth motion for 3 minutes. The area on your body should be completely wet with CHG when you are done scrubbing.  Do not rinse. Discard the cloth and let the area air-dry. Do not put any substances on the area afterward, such as powder, lotion,  or perfume.  Put on clean clothes or pajamas.  If it is the night before your surgery, sleep in clean sheets.     For general bathing  Follow these steps when using CHG cloths for general bathing (unless your health care provider gives you different instructions).  Use a separate CHG cloth for each area of your body. Make sure you wash between any folds of skin and between your fingers and toes. Wash your body in the following order, switching to a new cloth after each step:  The front of your neck, shoulders, and chest.  Both of your arms, under your arms, and your hands.  Your stomach and groin area, avoiding the genitals.  Your right leg and foot.  Your left leg and foot.  The back of your neck, your back, and your buttocks.  Do not rinse. Discard the cloth and let the area air-dry. Do not put any substances on your body afterward--such as powder, lotion, or perfume--unless you are told to do so by your health care provider. Only use lotions that are recommended by the .  Put on clean clothes or pajamas.  Contact a health care provider if:  Your skin gets irritated after scrubbing.  You have questions about using your solution or cloth.  You swallow any chlorhexidine. Call your local poison control center (1-408.930.8905 in the [place]  Get help right away if:  Your eyes itch badly, or they become very red or swollen.  Your skin itches badly and is red or swollen.  Your hearing changes.  You have trouble seeing.  You have swelling or tingling in your mouth or throat.  You have trouble breathing.  These symptoms may represent a serious problem that is an emergency. Do not wait to see if the symptoms will go away. Get medical help right away. Call your local emergency services (656 in the [place] Do not drive yourself to the hospital.  Summary  Chlorhexidine gluconate (CHG) is a germ-killing (antiseptic) solution that is used to clean the skin. Cleaning your skin with CHG may help to lower your risk for  infection.  You may be given CHG to use for bathing. It may be in a bottle or in a prepackaged cloth to use on your skin. Carefully follow your health care provider's instructions and the instructions on the product label.  Do not use CHG if you have a chlorhexidine allergy.  Contact your health care provider if your skin gets irritated after scrubbing.  This information is not intended to replace advice given to you by your health care provider. Make sure you discuss any questions you have with your health care provider.  Document Revised: 04/17/2023 Document Reviewed: 02/28/2022  [employer] Patient Education © 2023 [employer]

## 2023-12-12 ENCOUNTER — ANESTHESIA EVENT (OUTPATIENT)
Dept: PERIOP | Facility: HOSPITAL | Age: 28
End: 2023-12-12
Payer: OTHER GOVERNMENT

## 2023-12-12 ENCOUNTER — HOSPITAL ENCOUNTER (OUTPATIENT)
Facility: HOSPITAL | Age: 28
Setting detail: HOSPITAL OUTPATIENT SURGERY
Discharge: HOME | End: 2023-12-12
Attending: SURGERY | Admitting: SURGERY
Payer: OTHER GOVERNMENT

## 2023-12-12 ENCOUNTER — ANESTHESIA (OUTPATIENT)
Dept: PERIOP | Facility: HOSPITAL | Age: 28
End: 2023-12-12
Payer: OTHER GOVERNMENT

## 2023-12-12 VITALS
RESPIRATION RATE: 16 BRPM | DIASTOLIC BLOOD PRESSURE: 66 MMHG | TEMPERATURE: 98.9 F | SYSTOLIC BLOOD PRESSURE: 111 MMHG | OXYGEN SATURATION: 94 % | HEART RATE: 63 BPM

## 2023-12-12 DIAGNOSIS — Z90.13 ACQUIRED ABSENCE OF BREAST AND ABSENT NIPPLE, BILATERAL: ICD-10-CM

## 2023-12-12 DIAGNOSIS — Z91.89 AT HIGH RISK FOR BREAST CANCER: Primary | ICD-10-CM

## 2023-12-12 PROCEDURE — 25810000003 LACTATED RINGERS PER 1000 ML: Performed by: SURGERY

## 2023-12-12 PROCEDURE — 25010000002 CEFAZOLIN PER 500 MG: Performed by: SURGERY

## 2023-12-12 PROCEDURE — 25010000002 DEXAMETHASONE PER 1 MG: Performed by: ANESTHESIOLOGY

## 2023-12-12 PROCEDURE — 25010000002 PROPOFOL 1000 MG/100ML EMULSION: Performed by: NURSE ANESTHETIST, CERTIFIED REGISTERED

## 2023-12-12 PROCEDURE — C1789 PROSTHESIS, BREAST, IMP: HCPCS | Performed by: SURGERY

## 2023-12-12 PROCEDURE — 25010000002 FENTANYL CITRATE (PF) 100 MCG/2ML SOLUTION: Performed by: NURSE ANESTHETIST, CERTIFIED REGISTERED

## 2023-12-12 PROCEDURE — 25010000002 PROPOFOL 10 MG/ML EMULSION: Performed by: NURSE ANESTHETIST, CERTIFIED REGISTERED

## 2023-12-12 PROCEDURE — 25010000002 ONDANSETRON PER 1 MG: Performed by: NURSE ANESTHETIST, CERTIFIED REGISTERED

## 2023-12-12 PROCEDURE — 25010000002 GENTAMICIN PER 80 MG: Performed by: SURGERY

## 2023-12-12 PROCEDURE — 25010000002 FENTANYL CITRATE (PF) 250 MCG/5ML SOLUTION: Performed by: NURSE ANESTHETIST, CERTIFIED REGISTERED

## 2023-12-12 DEVICE — SMOOTH MODERATE HIGH PROFILE XTRA, 630 CC  SMOOTH ROUND SILICONE
Type: IMPLANTABLE DEVICE | Site: BREAST | Status: FUNCTIONAL
Brand: MENTOR MEMORYGEL XTRA BREAST IMPLANT

## 2023-12-12 RX ORDER — TRAMADOL HYDROCHLORIDE 50 MG/1
50 TABLET ORAL EVERY 6 HOURS PRN
Qty: 12 TABLET | Refills: 0 | Status: SHIPPED | OUTPATIENT
Start: 2023-12-12 | End: 2023-12-15

## 2023-12-12 RX ORDER — SODIUM CHLORIDE, SODIUM LACTATE, POTASSIUM CHLORIDE, CALCIUM CHLORIDE 600; 310; 30; 20 MG/100ML; MG/100ML; MG/100ML; MG/100ML
100 INJECTION, SOLUTION INTRAVENOUS CONTINUOUS
Status: DISCONTINUED | OUTPATIENT
Start: 2023-12-12 | End: 2023-12-12 | Stop reason: HOSPADM

## 2023-12-12 RX ORDER — SODIUM CHLORIDE 0.9 % (FLUSH) 0.9 %
3 SYRINGE (ML) INJECTION AS NEEDED
Status: DISCONTINUED | OUTPATIENT
Start: 2023-12-12 | End: 2023-12-12 | Stop reason: HOSPADM

## 2023-12-12 RX ORDER — LIDOCAINE HYDROCHLORIDE AND EPINEPHRINE 10; 10 MG/ML; UG/ML
INJECTION, SOLUTION INFILTRATION; PERINEURAL AS NEEDED
Status: DISCONTINUED | OUTPATIENT
Start: 2023-12-12 | End: 2023-12-12 | Stop reason: HOSPADM

## 2023-12-12 RX ORDER — ONDANSETRON 4 MG/1
4 TABLET, FILM COATED ORAL EVERY 8 HOURS PRN
Qty: 6 TABLET | Refills: 0 | Status: SHIPPED | OUTPATIENT
Start: 2023-12-12 | End: 2023-12-14

## 2023-12-12 RX ORDER — ONDANSETRON 2 MG/ML
INJECTION INTRAMUSCULAR; INTRAVENOUS AS NEEDED
Status: DISCONTINUED | OUTPATIENT
Start: 2023-12-12 | End: 2023-12-12 | Stop reason: SURG

## 2023-12-12 RX ORDER — ATRACURIUM BESYLATE 10 MG/ML
INJECTION, SOLUTION INTRAVENOUS AS NEEDED
Status: DISCONTINUED | OUTPATIENT
Start: 2023-12-12 | End: 2023-12-12 | Stop reason: SURG

## 2023-12-12 RX ORDER — SODIUM CHLORIDE, SODIUM LACTATE, POTASSIUM CHLORIDE, CALCIUM CHLORIDE 600; 310; 30; 20 MG/100ML; MG/100ML; MG/100ML; MG/100ML
1000 INJECTION, SOLUTION INTRAVENOUS CONTINUOUS
Status: DISCONTINUED | OUTPATIENT
Start: 2023-12-12 | End: 2023-12-12 | Stop reason: HOSPADM

## 2023-12-12 RX ORDER — LABETALOL HYDROCHLORIDE 5 MG/ML
5 INJECTION, SOLUTION INTRAVENOUS
Status: DISCONTINUED | OUTPATIENT
Start: 2023-12-12 | End: 2023-12-12 | Stop reason: HOSPADM

## 2023-12-12 RX ORDER — FENTANYL CITRATE 50 UG/ML
25 INJECTION, SOLUTION INTRAMUSCULAR; INTRAVENOUS
Status: DISCONTINUED | OUTPATIENT
Start: 2023-12-12 | End: 2023-12-12 | Stop reason: HOSPADM

## 2023-12-12 RX ORDER — DEXAMETHASONE SODIUM PHOSPHATE 4 MG/ML
4 INJECTION, SOLUTION INTRA-ARTICULAR; INTRALESIONAL; INTRAMUSCULAR; INTRAVENOUS; SOFT TISSUE ONCE AS NEEDED
Status: COMPLETED | OUTPATIENT
Start: 2023-12-12 | End: 2023-12-12

## 2023-12-12 RX ORDER — FENTANYL CITRATE 50 UG/ML
INJECTION, SOLUTION INTRAMUSCULAR; INTRAVENOUS AS NEEDED
Status: DISCONTINUED | OUTPATIENT
Start: 2023-12-12 | End: 2023-12-12 | Stop reason: SURG

## 2023-12-12 RX ORDER — ONDANSETRON 2 MG/ML
4 INJECTION INTRAMUSCULAR; INTRAVENOUS ONCE AS NEEDED
Status: DISCONTINUED | OUTPATIENT
Start: 2023-12-12 | End: 2023-12-12 | Stop reason: HOSPADM

## 2023-12-12 RX ORDER — NALOXONE HCL 0.4 MG/ML
0.4 VIAL (ML) INJECTION AS NEEDED
Status: DISCONTINUED | OUTPATIENT
Start: 2023-12-12 | End: 2023-12-12 | Stop reason: HOSPADM

## 2023-12-12 RX ORDER — SODIUM CHLORIDE 0.9 % (FLUSH) 0.9 %
3 SYRINGE (ML) INJECTION EVERY 12 HOURS SCHEDULED
Status: DISCONTINUED | OUTPATIENT
Start: 2023-12-12 | End: 2023-12-12 | Stop reason: HOSPADM

## 2023-12-12 RX ORDER — LIDOCAINE HYDROCHLORIDE 10 MG/ML
0.5 INJECTION, SOLUTION EPIDURAL; INFILTRATION; INTRACAUDAL; PERINEURAL ONCE AS NEEDED
Status: DISCONTINUED | OUTPATIENT
Start: 2023-12-12 | End: 2023-12-12 | Stop reason: HOSPADM

## 2023-12-12 RX ORDER — LIDOCAINE HYDROCHLORIDE 20 MG/ML
INJECTION, SOLUTION EPIDURAL; INFILTRATION; INTRACAUDAL; PERINEURAL AS NEEDED
Status: DISCONTINUED | OUTPATIENT
Start: 2023-12-12 | End: 2023-12-12 | Stop reason: SURG

## 2023-12-12 RX ORDER — BUPIVACAINE HCL/0.9 % NACL/PF 0.125 %
PLASTIC BAG, INJECTION (ML) EPIDURAL AS NEEDED
Status: DISCONTINUED | OUTPATIENT
Start: 2023-12-12 | End: 2023-12-12 | Stop reason: SURG

## 2023-12-12 RX ORDER — MIDAZOLAM HYDROCHLORIDE 1 MG/ML
1 INJECTION INTRAMUSCULAR; INTRAVENOUS
Status: DISCONTINUED | OUTPATIENT
Start: 2023-12-12 | End: 2023-12-12 | Stop reason: HOSPADM

## 2023-12-12 RX ORDER — DROPERIDOL 2.5 MG/ML
0.62 INJECTION, SOLUTION INTRAMUSCULAR; INTRAVENOUS ONCE AS NEEDED
Status: DISCONTINUED | OUTPATIENT
Start: 2023-12-12 | End: 2023-12-12 | Stop reason: HOSPADM

## 2023-12-12 RX ORDER — HYDROCODONE BITARTRATE AND ACETAMINOPHEN 10; 325 MG/1; MG/1
1 TABLET ORAL EVERY 4 HOURS PRN
Status: DISCONTINUED | OUTPATIENT
Start: 2023-12-12 | End: 2023-12-12 | Stop reason: HOSPADM

## 2023-12-12 RX ORDER — SCOLOPAMINE TRANSDERMAL SYSTEM 1 MG/1
1 PATCH, EXTENDED RELEASE TRANSDERMAL ONCE
Status: DISCONTINUED | OUTPATIENT
Start: 2023-12-12 | End: 2023-12-12 | Stop reason: HOSPADM

## 2023-12-12 RX ORDER — SODIUM CHLORIDE 0.9 % (FLUSH) 0.9 %
3-10 SYRINGE (ML) INJECTION AS NEEDED
Status: DISCONTINUED | OUTPATIENT
Start: 2023-12-12 | End: 2023-12-12 | Stop reason: HOSPADM

## 2023-12-12 RX ORDER — CEPHALEXIN 500 MG/1
500 CAPSULE ORAL 4 TIMES DAILY
Qty: 12 CAPSULE | Refills: 0 | Status: SHIPPED | OUTPATIENT
Start: 2023-12-12 | End: 2023-12-15

## 2023-12-12 RX ORDER — FLUMAZENIL 0.1 MG/ML
0.2 INJECTION INTRAVENOUS AS NEEDED
Status: DISCONTINUED | OUTPATIENT
Start: 2023-12-12 | End: 2023-12-12 | Stop reason: HOSPADM

## 2023-12-12 RX ORDER — MAGNESIUM HYDROXIDE 1200 MG/15ML
LIQUID ORAL AS NEEDED
Status: DISCONTINUED | OUTPATIENT
Start: 2023-12-12 | End: 2023-12-12 | Stop reason: HOSPADM

## 2023-12-12 RX ORDER — NEOSTIGMINE METHYLSULFATE 5 MG/5 ML
SYRINGE (ML) INTRAVENOUS AS NEEDED
Status: DISCONTINUED | OUTPATIENT
Start: 2023-12-12 | End: 2023-12-12 | Stop reason: SURG

## 2023-12-12 RX ORDER — PROPOFOL 10 MG/ML
INJECTION, EMULSION INTRAVENOUS AS NEEDED
Status: DISCONTINUED | OUTPATIENT
Start: 2023-12-12 | End: 2023-12-12 | Stop reason: SURG

## 2023-12-12 RX ORDER — HYDROCODONE BITARTRATE AND ACETAMINOPHEN 5; 325 MG/1; MG/1
1 TABLET ORAL ONCE AS NEEDED
Status: DISCONTINUED | OUTPATIENT
Start: 2023-12-12 | End: 2023-12-12 | Stop reason: HOSPADM

## 2023-12-12 RX ORDER — FAMOTIDINE 10 MG/ML
20 INJECTION, SOLUTION INTRAVENOUS
Status: COMPLETED | OUTPATIENT
Start: 2023-12-12 | End: 2023-12-12

## 2023-12-12 RX ORDER — ACETAMINOPHEN 500 MG
1000 TABLET ORAL ONCE
Status: COMPLETED | OUTPATIENT
Start: 2023-12-12 | End: 2023-12-12

## 2023-12-12 RX ORDER — SODIUM CHLORIDE 9 MG/ML
40 INJECTION, SOLUTION INTRAVENOUS AS NEEDED
Status: DISCONTINUED | OUTPATIENT
Start: 2023-12-12 | End: 2023-12-12 | Stop reason: HOSPADM

## 2023-12-12 RX ORDER — ACETAMINOPHEN 650 MG
TABLET, EXTENDED RELEASE ORAL AS NEEDED
Status: DISCONTINUED | OUTPATIENT
Start: 2023-12-12 | End: 2023-12-12 | Stop reason: HOSPADM

## 2023-12-12 RX ORDER — KETAMINE HCL IN NACL, ISO-OSM 100MG/10ML
SYRINGE (ML) INJECTION AS NEEDED
Status: DISCONTINUED | OUTPATIENT
Start: 2023-12-12 | End: 2023-12-12 | Stop reason: SURG

## 2023-12-12 RX ADMIN — Medication 50 MG: at 11:37

## 2023-12-12 RX ADMIN — FENTANYL CITRATE 250 MCG: 50 INJECTION, SOLUTION INTRAMUSCULAR; INTRAVENOUS at 11:37

## 2023-12-12 RX ADMIN — ONDANSETRON 4 MG: 2 INJECTION INTRAMUSCULAR; INTRAVENOUS at 13:26

## 2023-12-12 RX ADMIN — PROPOFOL 150 MCG/KG/MIN: 10 INJECTION, EMULSION INTRAVENOUS at 11:38

## 2023-12-12 RX ADMIN — GLYCOPYRROLATE 0.4 MG: 0.2 INJECTION INTRAMUSCULAR; INTRAVENOUS at 13:26

## 2023-12-12 RX ADMIN — PROPOFOL 150 MG: 10 INJECTION, EMULSION INTRAVENOUS at 11:37

## 2023-12-12 RX ADMIN — ACETAMINOPHEN 1000 MG: 500 TABLET ORAL at 10:43

## 2023-12-12 RX ADMIN — DEXAMETHASONE SODIUM PHOSPHATE 4 MG: 4 INJECTION INTRA-ARTICULAR; INTRALESIONAL; INTRAMUSCULAR; INTRAVENOUS; SOFT TISSUE at 10:43

## 2023-12-12 RX ADMIN — SODIUM CHLORIDE, POTASSIUM CHLORIDE, SODIUM LACTATE AND CALCIUM CHLORIDE 1000 ML: 600; 310; 30; 20 INJECTION, SOLUTION INTRAVENOUS at 08:45

## 2023-12-12 RX ADMIN — Medication 100 MCG: at 12:45

## 2023-12-12 RX ADMIN — Medication 3 MG: at 13:26

## 2023-12-12 RX ADMIN — ATRACURIUM BESYLATE 30 MG: 10 INJECTION, SOLUTION INTRAVENOUS at 11:37

## 2023-12-12 RX ADMIN — FENTANYL CITRATE 100 MCG: 50 INJECTION INTRAMUSCULAR; INTRAVENOUS at 12:16

## 2023-12-12 RX ADMIN — SCOPALAMINE 1 PATCH: 1 PATCH, EXTENDED RELEASE TRANSDERMAL at 10:43

## 2023-12-12 RX ADMIN — FAMOTIDINE 20 MG: 10 INJECTION INTRAVENOUS at 10:43

## 2023-12-12 RX ADMIN — CEFAZOLIN 2000 MG: 2 INJECTION, POWDER, FOR SOLUTION INTRAMUSCULAR; INTRAVENOUS at 11:44

## 2023-12-12 RX ADMIN — LIDOCAINE HYDROCHLORIDE 60 MG: 20 INJECTION, SOLUTION EPIDURAL; INFILTRATION; INTRACAUDAL; PERINEURAL at 11:37

## 2023-12-12 NOTE — ANESTHESIA PROCEDURE NOTES
Airway  Urgency: elective    Date/Time: 12/12/2023 11:37 AM  Airway not difficult    General Information and Staff    Patient location during procedure: OR  CRNA/CAA: Rafa Webber CRNA    Indications and Patient Condition  Indications for airway management: airway protection    Preoxygenated: yes  MILS maintained throughout  Mask difficulty assessment: 1 - vent by mask    Final Airway Details  Final airway type: endotracheal airway      Successful airway: ETT  Cuffed: yes   Successful intubation technique: direct laryngoscopy  Endotracheal tube insertion site: oral  Blade: Lucero  Blade size: 2  ETT size (mm): 7.5  Cormack-Lehane Classification: grade I - full view of glottis  Placement verified by: chest auscultation and capnometry   Cuff volume (mL): 5  Measured from: lips  ETT/EBT  to lips (cm): 20  Number of attempts at approach: 1

## 2023-12-12 NOTE — ANESTHESIA POSTPROCEDURE EVALUATION
Patient: Rena Quiroz    Procedure Summary       Date: 12/12/23 Room / Location:  [facility]  /  [facility]    Anesthesia Start: 1134 Anesthesia Stop: 1355    Procedure: SECOND STAGE BILATERAL BREAST RECONSTRUCTION  WITH IMPLANT EXCHANGE (Bilateral: Breast) Diagnosis:     Surgeons: Juan J Mcneal MD Provider: Alen Loza CRNA    Anesthesia Type: general ASA Status: 2            Anesthesia Type: general    Vitals  Vitals Value Taken Time   /73 12/12/23 1510   Temp 98.9 °F (37.2 °C) 12/12/23 1510   Pulse 61 12/12/23 1513   Resp 16 12/12/23 1510   SpO2 95 % 12/12/23 1513   Vitals shown include unfiled device data.        Post Anesthesia Care and Evaluation    Patient location during evaluation: PACU  Patient participation: complete - patient participated  Level of consciousness: awake and alert  Pain management: adequate    Airway patency: patent  Anesthetic complications: No anesthetic complications    Cardiovascular status: acceptable  Respiratory status: acceptable  Hydration status: acceptable    Comments: Blood pressure 111/66, pulse 63, temperature 98.9 °F (37.2 °C), resp. rate 16, SpO2 94%, not currently breastfeeding.    Pt discharged from PACU based on angie score >8

## 2023-12-12 NOTE — ANESTHESIA PREPROCEDURE EVALUATION
Anesthesia Evaluation     Patient summary reviewed   history of anesthetic complications:  PONV  NPO Solid Status: > 8 hours  NPO Liquid Status: > 8 hours           Airway   Mallampati: I  TM distance: >3 FB  Neck ROM: full  No difficulty expected  Dental - normal exam     Pulmonary - negative pulmonary ROS   (-) asthma, sleep apnea, not a smoker  Cardiovascular   Exercise tolerance: excellent (>7 METS)    (-) hypertension, hyperlipidemia      Neuro/Psych  (+) headaches, psychiatric history Anxiety and Depression  (-) seizures, TIA, CVA  GI/Hepatic/Renal/Endo    (+) diabetes mellitus (borderline)  (-) liver disease, no renal disease    ROS Comment: S/p gastric bypass 2017    Musculoskeletal     Abdominal    Substance History      OB/GYN          Other      history of cancer (breast)                Anesthesia Plan    ASA 2     general     intravenous induction     Anesthetic plan, risks, benefits, and alternatives have been provided, discussed and informed consent has been obtained with: patient.    CODE STATUS:

## 2023-12-12 NOTE — OP NOTE
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  We will proceed with expanders and placement of gel implants as planned.     OPERATIVE FINDINGS:  The final right breast implant was a 630 mL moderate high extra smooth, round, gel, [place] implant  The final left breast implant was a 630 mL moderate high extra smooth, round, gel, [employer] implant.     DESCRIPTION OF PROCEDURE:  Beginning on the right side, I incised along the existing mastectomy scar.   I then dissected through the subcutaneous tissue with electrocautery and encountered the tissue expander pocket.  I performed a capsulotomy and remove the tissue expander.  I then released the superior sulcus from 10:00 through 2:00 clockwise and performed advancement superiorly to expand the superior pole.  I also performed modest dart capsulotomies along the superior sulcus.  I then irrigated this pocket with Betadine containing triple antibiotic solution and obtained hemostasis.  I placed a temporary sizer in the breast and reapproximated skin with staples.     I then moved to the left side and proceeded in similar fashion.  I performed mirror image capsulotomies.  I then irrigated and hemostasis the left side as well.  I placed a temporary sizer in the left breast and reapproximated the skin with staples.     I then placed the patient in an upright position on the table.  I compared various volumes in the expanders ranging from  500 to 650 mL.  I decided upon a 630 mL implant volume bilaterally.  The patient was returned to a supine position, staples were removed, sizers were taken out, and both implant pockets were again inspected for hemostasis.  A second round of irrigation with Betadine containing triple antibiotic solution was performed.     Beginning on the right side, the final implant was placed.  A layered closure was performed over the implant with 4-0 Vicryl and 4-0 Monocryl.  I then moved to the left side and proceeded in mirror-image fashion.  Both incisions were dressed with Steri-Strips.  A postoperative breast wrap was applied.     BLOOD LOSS:   10 mL     COMPLICATIONS:   None     SPECIMENS:          None    IMPLANTS:    Implant Name Type Inv. Item Serial No.  Lot No. LRB No. Used Action   BRST KYLEE MEMORYGEL XTRA/SMOTH RND MOD H/P 630CC - [id number] Implant BRST KYLEE MEMORYGEL XTRA/SMOTH RND MOD H/P 630CC  MENTOR DraftMix [id number] Left 1 Implanted   BRST KYLEE MEMORYGEL XTRA/SMOTH RND MOD H/P 630CC - [id number] Implant BRST KYLEE MEMORYGEL XTRA/SMOTH RND MOD H/P 630CC  MENTOR PRATEEK [id number] Right 1 Implanted     DISPOSITION:   Home and self-care.  Return to clinic tomorrow.    STAFF:  Surgeon(s):  Juan J Mcneal MD           Electronically signed by Juan J Mcneal MD, 12/12/23, 1:37 PM CST.

## 2023-12-12 NOTE — DISCHARGE INSTRUCTIONS
Breast Surgery Postoperative Instructions  Juan J Mcneal MD    DRESSING CARE:  Please keep your breast wrap in place until you return to clinic.    It is important for this dressing to remain clean and dry.      ACTIVITY:  Please spend at least 5 to 10 minutes on your feet every hour from the time you discharge until the time you normally go to bed.  You do not have to wake up through the night to perform this activity.  Please do not lift more than 10 pounds.    Do not stretch behind your back.   Do not reach over your head.      CLINIC FOLLOW UP:  You will have scheduled follow-up in my clinic tomorrow.    Please be sure that this appointment time has been confirmed before you leave the hospital. If you have not been given an appointment time, please call my office to schedule an appointment.    MEDICATIONS:  Please take your prescription pain medication only as needed.    It is ok to take over-the-counter pain medications as well per package instructions.    Please wean off of your prescription pain medication as soon as possible.  If you have been prescribed antibiotics, please take them as instructed.    DRAIN CARE:  If you have drains in place, please follow the instructions provided by your nurse for drain care.  Please keep a very accurate record of your drain output for each individual drain.    Questions or Concerns    If you have additional questions or concerns, please contact [facility] at [phone] during or after office hours. After hours, you will have the option to press #1 to speak to the answering service.  They will be able to put you in touch with Dr. Mcneal if necessary.     In the case of an emergency, please call [phone].    Signs and Symptoms of Infection and/or Complication:  Rapid or asymmetric swelling  Swelling that worsens after the first 48 hours  Redness and warmth developing on previously normal-appearing skin  Drainage other than scant  blood or blood-tinged clear fluid  Fever or chills  Nausea and vomiting or diarrhea   Separation of the incision  Bleeding that does not stop with pressure    Signs and Symptoms of a Potential Emergency:    If you experience any of the following during your postoperative recovery, this may represent an emergency.  Please call [phone] and seek immediate medical attention:    Loss of consciousness or “blacking out”  Worsening shortness of breath or inability to catch your breath  No onset chest, shoulder, or neck pain  Leg pain or swelling  Light-headedness or dizziness when attempting to stand or walk

## 2023-12-13 ENCOUNTER — OFFICE VISIT (OUTPATIENT)
Dept: SURGERY | Facility: CLINIC | Age: 28
End: 2023-12-13
Payer: OTHER GOVERNMENT

## 2023-12-13 VITALS — OXYGEN SATURATION: 97 % | HEART RATE: 73 BPM | WEIGHT: 175 LBS | BODY MASS INDEX: 25.92 KG/M2 | HEIGHT: 69 IN

## 2023-12-13 DIAGNOSIS — Z90.13 S/P BILATERAL MASTECTOMY: Primary | ICD-10-CM

## 2023-12-13 DIAGNOSIS — Z91.89 INCREASED RISK OF BREAST CANCER: ICD-10-CM

## 2023-12-13 PROCEDURE — 99213 OFFICE O/P EST LOW 20 MIN: CPT

## 2023-12-13 NOTE — ANESTHESIA POSTPROCEDURE EVALUATION
Patient: Rena Quiroz    Procedure Summary       Date: 12/12/23 Room / Location:  [facility]  /  [facility]    Anesthesia Start: 1134 Anesthesia Stop: 1355    Procedure: SECOND STAGE BILATERAL BREAST RECONSTRUCTION  WITH IMPLANT EXCHANGE (Bilateral: Breast) Diagnosis:     Surgeons: Juan J Mcneal MD Provider: Alen Loza CRNA    Anesthesia Type: general ASA Status: 2            Anesthesia Type: general    Vitals  Vitals Value Taken Time   /73 12/12/23 1510   Temp 98.9 °F (37.2 °C) 12/12/23 1510   Pulse 61 12/12/23 1513   Resp 16 12/12/23 1510   SpO2 95 % 12/12/23 1513   Vitals shown include unfiled device data.        Post Anesthesia Care and Evaluation    Patient location during evaluation: PHASE II  Patient participation: complete - patient participated  Level of consciousness: awake and alert  Pain score: 0  Pain management: adequate    Airway patency: patent  Anesthetic complications: No anesthetic complications  PONV Status: none  Cardiovascular status: acceptable and blood pressure returned to baseline  Respiratory status: acceptable  Hydration status: acceptable    Comments: Patient discharged from PACU based on Melecio score >8  No anesthesia care post op

## 2023-12-13 NOTE — PROGRESS NOTES
Office Established Patient Note:     Referring Provider: No ref. provider found    No chief complaint on file.      Subjective .     History of present illness:  Rena Quiroz is a 28 y.o. female who presents for 3 month follow up after prophylactic bilateral mastectomy by Dr. Chavira in 2023. She elected to have reconstruction with Dr. Mcneal, who placed tissue expanders temporarily before placing permanent implants yesterday.     BMI is 25.8. She is a nonsmoker. She does not take any blood thinners.     History  Past Medical History:   Diagnosis Date    Abnormal Pap smear of cervix     Anemia     Anxiety     Depression     Headache     Obesity     Polycystic ovary syndrome     PONV (postoperative nausea and vomiting)     Prediabetes    ,   Past Surgical History:   Procedure Laterality Date    BARIATRIC SURGERY      BREAST RECONSTRUCTION Bilateral 2023    Procedure: SECOND STAGE BILATERAL BREAST RECONSTRUCTION  WITH IMPLANT EXCHANGE;  Surgeon: Juan J Mcneal MD;  Location: [facility] OR;  Service: Plastics;  Laterality: Bilateral;    BREAST RECONSTRUCTION, BREAST TISSUE EXPANDER INSERTION Bilateral 8/15/2023    Procedure: BILATERAL FIRST STAGE BREAST RECONSTRUCTION WITH  TISSUE EXPANDER PLACEMENT;  Surgeon: Juan J Mcneal MD;  Location: [facility] OR;  Service: Plastics;  Laterality: Bilateral;     SECTION      tubal in addition    CHOLECYSTECTOMY      COLONOSCOPY N/A 2022    Procedure: COLONOSCOPY WITH ANESTHESIA;  Surgeon: Prashanth Isaac DO;  Location: [facility] ENDOSCOPY;  Service: Gastroenterology;  Laterality: N/A;  pre op: alt bowel fxn   post op:polyps  PCP: Nicol Paula APRN    COSMETIC SURGERY      tummy tuck    HYSTERECTOMY      MASTECTOMY Bilateral 8/15/2023    Procedure: BILATERAL MASTECTOMY;  Surgeon: Sharita Chavira MD;  Location: [facility] OR;  Service: General;  Laterality: Bilateral;    TONSILLECTOMY      VAGINAL HYSTERECTOMY  2021     Lap/vag assist due to menorrhagia   ,   Family History   Problem Relation Age of Onset    Hypertension Father     Hyperlipidemia Father     Heart disease Father     Breast cancer Mother 41    Cancer Mother     Autism Son     Colon cancer Paternal Grandfather     Breast cancer Paternal Grandmother     Breast cancer Maternal Grandmother     Colon polyps Neg Hx     Esophageal cancer Neg Hx    ,   Social History     Tobacco Use    Smoking status: Never    Smokeless tobacco: Never   Vaping Use    Vaping Use: Never used   Substance Use Topics    Alcohol use: Never    Drug use: Never   , (Not in a hospital admission)   and Allergies:  Nsaids    Current Outpatient Medications:     cephalexin (KEFLEX) 500 MG capsule, Take 1 capsule by mouth 4 (Four) Times a Day for 3 days., Disp: 12 capsule, Rfl: 0    clonazePAM (KlonoPIN) 0.5 MG tablet, Take 1 tablet by mouth 2 (Two) Times a Day As Needed for Anxiety., Disp: 60 tablet, Rfl: 0    cyanocobalamin 1000 MCG/ML injection, INJECT 1 ML INTRAMUSCULARLY ONCE EVERY 2 WEEKS (Patient taking differently: Every 30 (Thirty) Days. INJECT 1 ML INTRAMUSCULARLY ONCE EVERY 2 WEEKS), Disp: 20 mL, Rfl: 0    gabapentin (NEURONTIN) 300 MG capsule, , Disp: , Rfl:     naloxone (NARCAN) 4 MG/0.1ML nasal spray, Call 911. Don't prime. [place] in 1 nostril for overdose. Repeat in 2-3 minutes in other nostril if no or minimal breathing/responsiveness., Disp: 2 each, Rfl: 0    ondansetron (Zofran) 4 MG tablet, Take 1 tablet by mouth Every 8 (Eight) Hours As Needed for Nausea or Vomiting., Disp: 6 tablet, Rfl: 0    ondansetron (Zofran) 4 MG tablet, Take 1 tablet by mouth Every 8 (Eight) Hours As Needed for Nausea or Vomiting for up to 2 days., Disp: 6 tablet, Rfl: 0    sertraline (ZOLOFT) 50 MG tablet, Take 75 mg daily for 1 week.  Then take 100 mg daily. (Patient taking differently: Take 1.5 tablets by mouth Daily. Take 75 mg daily for 1 week.  Then take 100 mg daily.), Disp: 50 tablet, Rfl: 2    traMADol  "(ULTRAM) 50 MG tablet, Take 1 tablet by mouth 2 (Two) Times a Day., Disp: 14 tablet, Rfl: 0    traMADol (Ultram) 50 MG tablet, Take 1 tablet by mouth Every 6 (Six) Hours As Needed for Severe Pain or Moderate Pain for up to 3 days., Disp: 12 tablet, Rfl: 0    Objective     Vital Signs   Pulse 73   Ht 175.3 cm (69\")   Wt 79.4 kg (175 lb)   SpO2 97%   BMI 25.84 kg/m²      Physical Exam:  General appearance - alert, well appearing, and in no distress and oriented to person, place, and time  Mental status - alert, oriented to person, place, and time, normal mood, behavior, speech, dress, motor activity, and thought processes  Eyes - sclera anicteric  Neck - supple, no significant adenopathy  Chest - no tachypnea, retractions or cyanosis  Heart - normal rate and regular rhythm  Breasts - bilateral implants, no palpable abnormalities otherwise  Neurological - alert, oriented, normal speech, no focal findings or movement disorder noted  Skin - normal coloration and turgor, no rashes, no suspicious skin lesions noted    Results Review:  Result Review :          Op Note by Sharita Chavira MD (08/15/2023 13:33)     PROCEDURES PERFORMED:    (1) Bilateral Skin Sparing Mastectomy    Progress Notes by Sharita Chavira MD (08/21/2023 14:30)   She is doing well post op. Pathology reviewed. Follow up in 6 months. Follow up with Dr. Mcneal today as scheduled.      Assessment & Plan       Diagnoses and all orders for this visit:    1. S/P bilateral mastectomy (Primary)    2. Increased risk of breast cancer     Mrs. Quiroz is a 27 y/o female who presents for 3 month follow up after a bilateral mastectomy with reconstruction. She had her permanent implants were placed yesterday by Dr. Mcneal. She is doing well. She is scheduled to see Dr. Chavira in February of 2024. She will keep that appointment and follow up with her then. She is agreeable to the plan.     Follow up:      Return for Next scheduled follow up with Dr." Fan .      Wendy Carvalho PA-C  12/14/23  16:04 CST

## 2024-01-22 ENCOUNTER — OFFICE VISIT (OUTPATIENT)
Dept: FAMILY MEDICINE CLINIC | Facility: CLINIC | Age: 29
End: 2024-01-22
Payer: OTHER GOVERNMENT

## 2024-01-22 VITALS
HEIGHT: 69 IN | RESPIRATION RATE: 18 BRPM | SYSTOLIC BLOOD PRESSURE: 113 MMHG | BODY MASS INDEX: 25.62 KG/M2 | OXYGEN SATURATION: 99 % | HEART RATE: 91 BPM | TEMPERATURE: 98.4 F | DIASTOLIC BLOOD PRESSURE: 84 MMHG | WEIGHT: 173 LBS

## 2024-01-22 DIAGNOSIS — R19.7 DIARRHEA, UNSPECIFIED TYPE: ICD-10-CM

## 2024-01-22 DIAGNOSIS — J06.9 UPPER RESPIRATORY TRACT INFECTION, UNSPECIFIED TYPE: ICD-10-CM

## 2024-01-22 DIAGNOSIS — H10.32 ACUTE BACTERIAL CONJUNCTIVITIS OF LEFT EYE: ICD-10-CM

## 2024-01-22 DIAGNOSIS — R11.11 VOMITING WITHOUT NAUSEA, UNSPECIFIED VOMITING TYPE: ICD-10-CM

## 2024-01-22 DIAGNOSIS — R05.9 COUGH, UNSPECIFIED TYPE: Primary | ICD-10-CM

## 2024-01-22 DIAGNOSIS — E66.3 OVERWEIGHT (BMI 25.0-29.9): ICD-10-CM

## 2024-01-22 LAB
EXPIRATION DATE: NORMAL
FLUAV AG UPPER RESP QL IA.RAPID: NOT DETECTED
FLUBV AG UPPER RESP QL IA.RAPID: NOT DETECTED
INTERNAL CONTROL: NORMAL
Lab: NORMAL
SARS-COV-2 AG UPPER RESP QL IA.RAPID: NOT DETECTED

## 2024-01-22 PROCEDURE — 96372 THER/PROPH/DIAG INJ SC/IM: CPT | Performed by: NURSE PRACTITIONER

## 2024-01-22 PROCEDURE — 99213 OFFICE O/P EST LOW 20 MIN: CPT | Performed by: NURSE PRACTITIONER

## 2024-01-22 PROCEDURE — 87428 SARSCOV & INF VIR A&B AG IA: CPT | Performed by: NURSE PRACTITIONER

## 2024-01-22 RX ORDER — GUAIFENESIN AND DEXTROMETHORPHAN HYDROBROMIDE 600; 30 MG/1; MG/1
2 TABLET, EXTENDED RELEASE ORAL EVERY 12 HOURS SCHEDULED
Qty: 28 TABLET | Refills: 0 | Status: SHIPPED | OUTPATIENT
Start: 2024-01-22 | End: 2024-01-29

## 2024-01-22 RX ORDER — AZITHROMYCIN 250 MG/1
TABLET, FILM COATED ORAL
Qty: 6 TABLET | Refills: 0 | Status: SHIPPED | OUTPATIENT
Start: 2024-01-22

## 2024-01-22 RX ORDER — DEXAMETHASONE SODIUM PHOSPHATE 10 MG/ML
10 INJECTION INTRAMUSCULAR; INTRAVENOUS ONCE
Status: COMPLETED | OUTPATIENT
Start: 2024-01-22 | End: 2024-01-22

## 2024-01-22 RX ORDER — TOBRAMYCIN 3 MG/ML
1 SOLUTION/ DROPS OPHTHALMIC
Qty: 2 ML | Refills: 0 | Status: SHIPPED | OUTPATIENT
Start: 2024-01-22 | End: 2024-01-29

## 2024-01-22 RX ORDER — BENZONATATE 100 MG/1
100 CAPSULE ORAL 3 TIMES DAILY PRN
Qty: 21 CAPSULE | Refills: 0 | Status: SHIPPED | OUTPATIENT
Start: 2024-01-22

## 2024-01-22 RX ORDER — METHYLPREDNISOLONE 4 MG/1
TABLET ORAL
Qty: 21 TABLET | Refills: 0 | Status: SHIPPED | OUTPATIENT
Start: 2024-01-22

## 2024-01-22 RX ADMIN — DEXAMETHASONE SODIUM PHOSPHATE 10 MG: 10 INJECTION INTRAMUSCULAR; INTRAVENOUS at 10:43

## 2024-01-22 NOTE — PROGRESS NOTES
Subjective     Chief Complaint   Patient presents with    Cough    Vomiting    Diarrhea    Eye Problem       Cough    Vomiting   Associated symptoms include coughing and diarrhea.   Diarrhea   Associated symptoms include coughing and vomiting.   Eye Problem   Associated symptoms include vomiting.       Patient states that symptoms started a week ago and are worsening.  Fever started Saturday afternoon and is now having diarrhea.  She has vomitted related to post nasal drainage.  She has body aches.  Left eye is red, irritated and is crusting starting today.  She is taking OTC Dayquil and Nyquil and saline nasal spray.      Patient's PMR from outside medical facility reviewed and noted.    Review of Systems   Respiratory:  Positive for cough.    Gastrointestinal:  Positive for diarrhea and vomiting.        Otherwise complete ROS reviewed and negative except as mentioned in the HPI.    Past Medical History:   Past Medical History:   Diagnosis Date    Abnormal Pap smear of cervix 2018    Anemia     Anxiety     Depression     Headache     Obesity     Polycystic ovary syndrome     PONV (postoperative nausea and vomiting)     Prediabetes      Past Surgical History:  Past Surgical History:   Procedure Laterality Date    BARIATRIC SURGERY      BREAST RECONSTRUCTION Bilateral 2023    Procedure: SECOND STAGE BILATERAL BREAST RECONSTRUCTION  WITH IMPLANT EXCHANGE;  Surgeon: Juan J Mcneal MD;  Location: [facility] OR;  Service: Plastics;  Laterality: Bilateral;    BREAST RECONSTRUCTION, BREAST TISSUE EXPANDER INSERTION Bilateral 8/15/2023    Procedure: BILATERAL FIRST STAGE BREAST RECONSTRUCTION WITH  TISSUE EXPANDER PLACEMENT;  Surgeon: Juan J Mcneal MD;  Location: [facility] OR;  Service: Plastics;  Laterality: Bilateral;     SECTION      tubal in addition    CHOLECYSTECTOMY      COLONOSCOPY N/A 2022    Procedure: COLONOSCOPY WITH ANESTHESIA;  Surgeon: Prashanth Isaac DO;  Location: [facility]  ENDOSCOPY;  Service: Gastroenterology;  Laterality: N/A;  pre op: alt bowel fxn   post op:polyps  PCP: Nicol Paula APRN    COSMETIC SURGERY      tummy tuck    HYSTERECTOMY      MASTECTOMY Bilateral 8/15/2023    Procedure: BILATERAL MASTECTOMY;  Surgeon: Sharita Chavira MD;  Location: [facility] OR;  Service: General;  Laterality: Bilateral;    TONSILLECTOMY      VAGINAL HYSTERECTOMY  April 2021    Lap/vag assist due to menorrhagia     Social History:  reports that she has never smoked. She has never used smokeless tobacco. She reports that she does not drink alcohol and does not use drugs.    Family History: family history includes Autism in her son; Breast cancer in her maternal grandmother and paternal grandmother; Breast cancer (age of onset: 41) in her mother; Cancer in her mother; Colon cancer in her paternal grandfather; Heart disease in her father; Hyperlipidemia in her father; Hypertension in her father.      Allergies:  Allergies   Allergen Reactions    Nsaids Other (See Comments)     Previous weight loss surgery--CAN'T TOLERATE LARGE DOSAGES     Medications:  Prior to Admission medications    Medication Sig Start Date End Date Taking? Authorizing Provider   clonazePAM (KlonoPIN) 0.5 MG tablet Take 1 tablet by mouth 2 (Two) Times a Day As Needed for Anxiety. 2/1/23  Yes Nicol Paula APRN   cyanocobalamin 1000 MCG/ML injection INJECT 1 ML INTRAMUSCULARLY ONCE EVERY 2 WEEKS  Patient taking differently: Every 30 (Thirty) Days. INJECT 1 ML INTRAMUSCULARLY ONCE EVERY 2 WEEKS 5/2/23  Yes Nicol Paula APRN   gabapentin (NEURONTIN) 300 MG capsule  8/17/23  Yes Provider, MD Amarjit   naloxone (NARCAN) 4 MG/0.1ML nasal spray Call 911. Don't prime. Jeannette in 1 nostril for overdose. Repeat in 2-3 minutes in other nostril if no or minimal breathing/responsiveness. 8/16/23  Yes Juan J Mcneal MD   ondansetron (Zofran) 4 MG tablet Take 1 tablet by mouth Every 8 (Eight) Hours  "As Needed for Nausea or Vomiting. 8/16/23  Yes Juan J Mcneal MD   sertraline (ZOLOFT) 50 MG tablet Take 75 mg daily for 1 week.  Then take 100 mg daily.  Patient taking differently: Take 1.5 tablets by mouth Daily. Take 75 mg daily for 1 week.  Then take 100 mg daily. 2/15/23  Yes Nicol Paula APRN   traMADol (ULTRAM) 50 MG tablet Take 1 tablet by mouth 2 (Two) Times a Day. 8/21/23  Yes Nicol Paula APRN       PHQ-9 Depression Screening  Little interest or pleasure in doing things? 0-->not at all   Feeling down, depressed, or hopeless? 0-->not at all   Trouble falling or staying asleep, or sleeping too much?     Feeling tired or having little energy?     Poor appetite or overeating?     Feeling bad about yourself - or that you are a failure or have let yourself or your family down?     Trouble concentrating on things, such as reading the newspaper or watching television?     Moving or speaking so slowly that other people could have noticed? Or the opposite - being so fidgety or restless that you have been moving around a lot more than usual?     Thoughts that you would be better off dead, or of hurting yourself in some way?     PHQ-9 Total Score 0   If you checked off any problems, how difficult have these problems made it for you to do your work, take care of things at home, or get along with other people?         PHQ-9 Total Score: 0   0 (Negative screening for depression)  Support given, observe for worsening symptoms    Objective     Vital Signs: /84 (BP Location: Left arm, Patient Position: Sitting, Cuff Size: Adult)   Pulse 91   Temp 98.4 °F (36.9 °C) (Infrared)   Resp 18   Ht 175.3 cm (69\")   Wt 78.5 kg (173 lb)   SpO2 99%   BMI 25.55 kg/m²   Physical Exam  Vitals and nursing note reviewed.   Constitutional:       Appearance: She is overweight. She is ill-appearing.   HENT:      Head: Normocephalic.      Right Ear: Tympanic membrane normal.      Left Ear: Tympanic " membrane normal.      Nose: Congestion present.      Mouth/Throat:      Mouth: Mucous membranes are moist.   Eyes:      General:         Left eye: Discharge present.     Conjunctiva/sclera:      Right eye: Right conjunctiva is not injected.      Left eye: Left conjunctiva is injected.   Cardiovascular:      Rate and Rhythm: Normal rate and regular rhythm.      Pulses: Normal pulses.      Heart sounds: Normal heart sounds.   Pulmonary:      Effort: Pulmonary effort is normal.      Breath sounds: Normal breath sounds.      Comments: Congested cough    Musculoskeletal:         General: Normal range of motion.      Cervical back: Normal range of motion.   Skin:     General: Skin is warm and dry.   Neurological:      General: No focal deficit present.      Mental Status: She is alert and oriented to person, place, and time.   Psychiatric:         Mood and Affect: Mood normal.         Behavior: Behavior normal.         Results Reviewed:  Glucose   Date Value Ref Range Status   08/16/2023 96 65 - 99 mg/dL Final     BUN   Date Value Ref Range Status   08/16/2023 10 6 - 20 mg/dL Final     Creatinine   Date Value Ref Range Status   08/16/2023 0.60 0.57 - 1.00 mg/dL Final   06/02/2023 0.60 0.60 - 1.30 mg/dL Final     Comment:     Serial Number: [id number]:  [id number]     Sodium   Date Value Ref Range Status   08/16/2023 139 136 - 145 mmol/L Final     Potassium   Date Value Ref Range Status   08/16/2023 4.1 3.5 - 5.2 mmol/L Final     Chloride   Date Value Ref Range Status   08/16/2023 104 98 - 107 mmol/L Final     CO2   Date Value Ref Range Status   08/16/2023 25.0 22.0 - 29.0 mmol/L Final     Calcium   Date Value Ref Range Status   08/16/2023 8.6 8.6 - 10.5 mg/dL Final     ALT (SGPT)   Date Value Ref Range Status   08/08/2023 15 1 - 33 U/L Final     AST (SGOT)   Date Value Ref Range Status   08/08/2023 15 1 - 32 U/L Final     WBC   Date Value Ref Range Status   11/29/2023 5.93 3.40 - 10.80 10*3/mm3 Final   12/05/2022 4.1 3.4  - 10.8 x10E3/uL Final     Hematocrit   Date Value Ref Range Status   11/29/2023 41.7 34.0 - 46.6 % Final     Platelets   Date Value Ref Range Status   11/29/2023 189 140 - 450 10*3/mm3 Final     Triglycerides   Date Value Ref Range Status   12/05/2022 37 0 - 149 mg/dL Final     HDL Cholesterol   Date Value Ref Range Status   12/05/2022 63 >39 mg/dL Final     LDL Chol Calc (NIH)   Date Value Ref Range Status   12/05/2022 65 0 - 99 mg/dL Final     Hemoglobin A1C   Date Value Ref Range Status   12/05/2022 5.2 4.8 - 5.6 % Final     Comment:              Prediabetes: 5.7 - 6.4           Diabetes: >6.4           Glycemic control for adults with diabetes: <7.0       POCT SARS-CoV-2 Antigen CAROL + Flu (01/22/2024 10:21)     Assessment / Plan     Assessment/Plan     Diagnoses and all orders for this visit:    1. Cough, unspecified type (Primary)  -     POCT SARS-CoV-2 Antigen CAROL + Flu  -     benzonatate (Tessalon Perles) 100 MG capsule; Take 1 capsule by mouth 3 (Three) Times a Day As Needed for Cough.  Dispense: 21 capsule; Refill: 0  -     methylPREDNISolone (MEDROL) 4 MG dose pack; Take as directed on package instructions.  Dispense: 21 tablet; Refill: 0    2. Vomiting without nausea, unspecified vomiting type  -     POCT SARS-CoV-2 Antigen CAROL + Flu    3. Diarrhea, unspecified type  -     POCT SARS-CoV-2 Antigen CAROL + Flu    4. Upper respiratory tract infection, unspecified type  -     guaifenesin-dextromethorphan (MUCINEX DM)  MG tablet sustained-release 12 hour tablet; Take 2 tablets by mouth Every 12 (Twelve) Hours for 7 days.  Dispense: 28 tablet; Refill: 0  -     dexAMETHasone (DECADRON) injection 10 mg  -     azithromycin (Zithromax Z-Elijah) 250 MG tablet; Take 2 tablets by mouth on day 1, then 1 tablet daily on days 2-5  Dispense: 6 tablet; Refill: 0    5. Acute bacterial conjunctivitis of left eye  -     tobramycin 0.3 % solution ophthalmic solution; Administer 1 drop into the left eye Every 4 (Four) Hours  While Awake for 7 days.  Dispense: 2 mL; Refill: 0    6. Overweight (BMI 25.0-29.9)         An After Visit Summary was printed and given to the patient at discharge.  Return if symptoms worsen or fail to improve.    I have discussed the patient results/orders and plan/recommendation with them at today's visit.      Nicol Paula, APRN   01/22/2024

## 2024-02-21 ENCOUNTER — OFFICE VISIT (OUTPATIENT)
Dept: SURGERY | Facility: CLINIC | Age: 29
End: 2024-02-21
Payer: OTHER GOVERNMENT

## 2024-02-21 VITALS
DIASTOLIC BLOOD PRESSURE: 60 MMHG | WEIGHT: 172 LBS | BODY MASS INDEX: 25.48 KG/M2 | HEIGHT: 69 IN | SYSTOLIC BLOOD PRESSURE: 126 MMHG

## 2024-02-21 DIAGNOSIS — Z90.13 S/P BILATERAL MASTECTOMY: Primary | ICD-10-CM

## 2024-02-21 PROCEDURE — 99212 OFFICE O/P EST SF 10 MIN: CPT | Performed by: STUDENT IN AN ORGANIZED HEALTH CARE EDUCATION/TRAINING PROGRAM

## 2024-02-22 NOTE — PATIENT INSTRUCTIONS

## 2024-02-22 NOTE — PROGRESS NOTES
Office Established Patient Note:     Referring Provider: No ref. provider found    Chief Complaint   Patient presents with    Follow-up     Patient is here for a 6 month follow up from B/L mastectomy        Subjective .     History of present illness:  Rena Quiroz is a 28 y.o. female s/p bilateral prophylactic mastectomies on 8/15/23 with reconstruction by Dr. Mcneal. She denies any breast complaints - no masses, no skin changes.     History  Past Medical History:   Diagnosis Date    Abnormal Pap smear of cervix     Anemia     Anxiety     Depression     Headache     Obesity     Polycystic ovary syndrome     PONV (postoperative nausea and vomiting)     Prediabetes    ,   Past Surgical History:   Procedure Laterality Date    BARIATRIC SURGERY      BREAST RECONSTRUCTION Bilateral 2023    Procedure: SECOND STAGE BILATERAL BREAST RECONSTRUCTION  WITH IMPLANT EXCHANGE;  Surgeon: Juan J Mcneal MD;  Location: [facility] OR;  Service: Plastics;  Laterality: Bilateral;    BREAST RECONSTRUCTION, BREAST TISSUE EXPANDER INSERTION Bilateral 8/15/2023    Procedure: BILATERAL FIRST STAGE BREAST RECONSTRUCTION WITH  TISSUE EXPANDER PLACEMENT;  Surgeon: Juan J Mcneal MD;  Location: [facility] OR;  Service: Plastics;  Laterality: Bilateral;     SECTION      tubal in addition    CHOLECYSTECTOMY      COLONOSCOPY N/A 2022    Procedure: COLONOSCOPY WITH ANESTHESIA;  Surgeon: Prashanth Isaac DO;  Location: [facility] ENDOSCOPY;  Service: Gastroenterology;  Laterality: N/A;  pre op: alt bowel fxn   post op:polyps  PCP: Nicol Paula APRN    COSMETIC SURGERY      tummy tuck    HYSTERECTOMY      MASTECTOMY Bilateral 8/15/2023    Procedure: BILATERAL MASTECTOMY;  Surgeon: Sharita Chavira MD;  Location: [facility] OR;  Service: General;  Laterality: Bilateral;    TONSILLECTOMY      VAGINAL HYSTERECTOMY  2021    Lap/vag assist due to menorrhagia   ,   Family History   Problem Relation Age of  "Onset    Hypertension Father     Hyperlipidemia Father     Heart disease Father     Breast cancer Mother 41    Cancer Mother     Autism Son     Colon cancer Paternal Grandfather     Breast cancer Paternal Grandmother     Breast cancer Maternal Grandmother     Colon polyps Neg Hx     Esophageal cancer Neg Hx    ,   Social History     Tobacco Use    Smoking status: Never    Smokeless tobacco: Never   Vaping Use    Vaping Use: Never used   Substance Use Topics    Alcohol use: Never    Drug use: Never   , (Not in a hospital admission)   and Allergies:  Nsaids    Current Outpatient Medications:     clonazePAM (KlonoPIN) 0.5 MG tablet, Take 1 tablet by mouth 2 (Two) Times a Day As Needed for Anxiety., Disp: 60 tablet, Rfl: 0    cyanocobalamin 1000 MCG/ML injection, INJECT 1 ML INTRAMUSCULARLY ONCE EVERY 2 WEEKS (Patient taking differently: Every 30 (Thirty) Days. INJECT 1 ML INTRAMUSCULARLY ONCE EVERY 2 WEEKS), Disp: 20 mL, Rfl: 0    sertraline (ZOLOFT) 50 MG tablet, Take 75 mg daily for 1 week.  Then take 100 mg daily. (Patient taking differently: Take 1.5 tablets by mouth Daily. Take 75 mg daily for 1 week.  Then take 100 mg daily.), Disp: 50 tablet, Rfl: 2      Objective     Vital Signs   /60 (BP Location: Left arm, Patient Position: Sitting, Cuff Size: Adult)   Ht 175.3 cm (69.02\")   Wt 78 kg (172 lb)   BMI 25.39 kg/m²      Physical Exam:  General appearance - alert, well appearing, and in no distress  Mental status - alert, oriented to person, place, and time  Eyes - pupils equal and reactive, extraocular eye movements intact  Neck - supple, no significant adenopathy  Chest - no tachypnea, retractions or cyanosis  Heart - normal rate and regular rhythm  Abdomen - soft, nontender, nondistended, no masses or organomegaly  Neurological - alert, oriented, normal speech, no focal findings or movement disorder noted  Breast Exam: S/p bilateral mastectomies - incisions well healed. Implants intact.No palpable " masses bilaterally. No palpable axillary nor supraclavicular adenopathy bilaterally.       Results Review:     The following data was reviewed by: Sharita Chavira MD on 02/21/2024:  Op Note by Juan J Mcneal MD (12/12/2023 11:57)     Assessment & Plan       Diagnoses and all orders for this visit:    1. S/P bilateral mastectomy (Primary)       She is doing well overall. No concerning findings on physical exam today. Follow up in 6 months.         Sharita Chavira MD  02/21/24  19:53 CST

## 2024-03-25 ENCOUNTER — OFFICE VISIT (OUTPATIENT)
Dept: FAMILY MEDICINE CLINIC | Facility: CLINIC | Age: 29
End: 2024-03-25
Payer: OTHER GOVERNMENT

## 2024-03-25 VITALS
HEART RATE: 105 BPM | WEIGHT: 177 LBS | RESPIRATION RATE: 18 BRPM | HEIGHT: 69 IN | DIASTOLIC BLOOD PRESSURE: 79 MMHG | SYSTOLIC BLOOD PRESSURE: 135 MMHG | BODY MASS INDEX: 26.22 KG/M2 | TEMPERATURE: 98 F | OXYGEN SATURATION: 99 %

## 2024-03-25 DIAGNOSIS — J40 BRONCHITIS: Primary | ICD-10-CM

## 2024-03-25 DIAGNOSIS — E66.3 OVERWEIGHT (BMI 25.0-29.9): ICD-10-CM

## 2024-03-25 RX ORDER — LEVOFLOXACIN 750 MG/1
750 TABLET, FILM COATED ORAL DAILY
Qty: 7 TABLET | Refills: 0 | Status: SHIPPED | OUTPATIENT
Start: 2024-03-25 | End: 2024-04-01

## 2024-03-25 RX ORDER — DIPHENHYDRAMINE HCL 25 MG
25 CAPSULE ORAL NIGHTLY PRN
COMMUNITY

## 2024-03-25 RX ORDER — PREDNISONE 10 MG/1
TABLET ORAL
Qty: 1 EACH | Refills: 0 | Status: SHIPPED | OUTPATIENT
Start: 2024-03-25

## 2024-03-25 NOTE — PROGRESS NOTES
Subjective     Chief Complaint   Patient presents with    Cough    Nasal Congestion              Cough      Patient presents today with URI symptoms.  Cough, runny nose, nasal congestion.  Nasal drainage is clear.  She states that she feels heaviness in her lungs that started Friday.  She feels very tired.  Denies fever.  Her twins had pneumonia last week with mycoplasma pneumonia last week.      Patient's PMR from outside medical facility reviewed and noted.    Review of Systems   Respiratory:  Positive for cough.         Otherwise complete ROS reviewed and negative except as mentioned in the HPI.    Past Medical History:   Past Medical History:   Diagnosis Date    Abnormal Pap smear of cervix 2018    Adverse effect of anesthesia     corneal abrasions    Anemia     Anxiety     Depression     Headache     Obesity     Personal history of COVID-19     Polycystic ovary syndrome     PONV (postoperative nausea and vomiting)     Prediabetes      Past Surgical History:  Past Surgical History:   Procedure Laterality Date    BARIATRIC SURGERY      BREAST RECONSTRUCTION Bilateral 2023    Procedure: SECOND STAGE BILATERAL BREAST RECONSTRUCTION  WITH IMPLANT EXCHANGE;  Surgeon: Juan J Mcneal MD;  Location: [facility] OR;  Service: Plastics;  Laterality: Bilateral;    BREAST RECONSTRUCTION, BREAST TISSUE EXPANDER INSERTION Bilateral 08/15/2023    Procedure: BILATERAL FIRST STAGE BREAST RECONSTRUCTION WITH  TISSUE EXPANDER PLACEMENT;  Surgeon: Juan J Mcneal MD;  Location: [facility] OR;  Service: Plastics;  Laterality: Bilateral;     SECTION      tubal in addition    CHOLECYSTECTOMY      COLONOSCOPY N/A 2022    Procedure: COLONOSCOPY WITH ANESTHESIA;  Surgeon: Prashanth Isaac DO;  Location: [facility] ENDOSCOPY;  Service: Gastroenterology;  Laterality: N/A;  pre op: alt bowel fxn   post op:polyps  PCP: Nicol Paula APRN    COSMETIC SURGERY      tummy tuck    MASTECTOMY Bilateral  08/15/2023    Procedure: BILATERAL MASTECTOMY;  Surgeon: Sharita Chavira MD;  Location: [facility];  Service: General;  Laterality: Bilateral;    TONSILLECTOMY      VAGINAL HYSTERECTOMY  April 2021    Lap/vag assist due to menorrhagia     Social History:  reports that she has never smoked. She has never used smokeless tobacco. She reports that she does not drink alcohol and does not use drugs.    Family History: family history includes Autism in her son; Breast cancer in her maternal grandmother and paternal grandmother; Breast cancer (age of onset: 41) in her mother; Cancer in her mother; Colon cancer in her paternal grandfather; Heart disease in her father; Hyperlipidemia in her father; Hypertension in her father.      Allergies:  Allergies   Allergen Reactions    Nsaids Other (See Comments)     Previous weight loss surgery--CAN'T TOLERATE LARGE DOSAGES     Medications:  Prior to Admission medications    Medication Sig Start Date End Date Taking? Authorizing Provider   clonazePAM (KlonoPIN) 0.5 MG tablet Take 1 tablet by mouth 2 (Two) Times a Day As Needed for Anxiety. 2/1/23  Yes Nicol Paula APRN   cyanocobalamin 1000 MCG/ML injection INJECT 1 ML INTRAMUSCULARLY ONCE EVERY 2 WEEKS  Patient taking differently: Inject 0.1 mL into the appropriate muscle as directed by prescriber Every 28 (Twenty-Eight) Days. 5/2/23  Yes Nicol Paula APRN   sertraline (ZOLOFT) 50 MG tablet Take 75 mg daily for 1 week.  Then take 100 mg daily.  Patient taking differently: Take 1.5 tablets by mouth Daily. Take 75 mg daily for 1 week.  Then take 100 mg daily. 2/15/23  Yes Nicol Paual APRN     PHQ-9 Depression Screening  Little interest or pleasure in doing things? 0-->not at all   Feeling down, depressed, or hopeless? 0-->not at all   Trouble falling or staying asleep, or sleeping too much?     Feeling tired or having little energy?     Poor appetite or overeating?     Feeling bad about  "yourself - or that you are a failure or have let yourself or your family down?     Trouble concentrating on things, such as reading the newspaper or watching television?     Moving or speaking so slowly that other people could have noticed? Or the opposite - being so fidgety or restless that you have been moving around a lot more than usual?     Thoughts that you would be better off dead, or of hurting yourself in some way?     PHQ-9 Total Score 0   If you checked off any problems, how difficult have these problems made it for you to do your work, take care of things at home, or get along with other people?         PHQ-9 Total Score: 0   0 (Negative screening for depression)  Support given, observe for worsening symptoms    Objective     Vital Signs: /79 (BP Location: Left arm, Patient Position: Sitting, Cuff Size: Adult)   Pulse 105   Temp 98 °F (36.7 °C) (Infrared)   Resp 18   Ht 175.3 cm (69\")   Wt 80.3 kg (177 lb)   SpO2 99%   BMI 26.14 kg/m²   Physical Exam  Vitals and nursing note reviewed.   Constitutional:       Appearance: She is overweight.   HENT:      Head: Normocephalic.      Right Ear: Tympanic membrane normal.      Left Ear: Tympanic membrane normal.      Nose: Nose normal.      Mouth/Throat:      Mouth: Mucous membranes are moist.      Pharynx: No posterior oropharyngeal erythema.   Eyes:      Conjunctiva/sclera: Conjunctivae normal.   Cardiovascular:      Rate and Rhythm: Normal rate and regular rhythm.      Pulses: Normal pulses.      Heart sounds: Normal heart sounds.   Pulmonary:      Effort: Pulmonary effort is normal.      Breath sounds: Normal breath sounds.      Comments: Chest tightness noted  Musculoskeletal:         General: Normal range of motion.      Cervical back: Normal range of motion.   Skin:     General: Skin is warm and dry.   Neurological:      General: No focal deficit present.      Mental Status: She is alert and oriented to person, place, and time.   Psychiatric:  "        Mood and Affect: Mood normal.         Behavior: Behavior normal.            Results Reviewed:  Glucose   Date Value Ref Range Status   08/16/2023 96 65 - 99 mg/dL Final     BUN   Date Value Ref Range Status   08/16/2023 10 6 - 20 mg/dL Final   08/12/2018 9 7 - 21 mg/dL Final     Creatinine   Date Value Ref Range Status   08/16/2023 0.60 0.57 - 1.00 mg/dL Final   06/02/2023 0.60 0.60 - 1.30 mg/dL Final     Comment:     Serial Number: [id number]:  [id number]   08/12/2018 0.72 0.57 - 1.11 mg/dL Final     Sodium   Date Value Ref Range Status   08/16/2023 139 136 - 145 mmol/L Final   08/12/2018 137 136 - 144 mmol/L Final     Potassium   Date Value Ref Range Status   08/16/2023 4.1 3.5 - 5.2 mmol/L Final   08/12/2018 3.2 (L) 3.3 - 4.8 mmol/L Final     Comment:     Serum Potassium Reference Range  = 3.5-5.1  mmol/L     Chloride   Date Value Ref Range Status   08/16/2023 104 98 - 107 mmol/L Final   08/12/2018 100 98 - 107 mmol/L Final     CO2   Date Value Ref Range Status   08/16/2023 25.0 22.0 - 29.0 mmol/L Final     Total CO2   Date Value Ref Range Status   08/12/2018 24 21 - 29 mmol/L Final     Calcium   Date Value Ref Range Status   08/16/2023 8.6 8.6 - 10.5 mg/dL Final   08/12/2018 9.7 8.4 - 10.5 mg/dL Final     ALT (SGPT)   Date Value Ref Range Status   08/08/2023 15 1 - 33 U/L Final     AST (SGOT)   Date Value Ref Range Status   08/08/2023 15 1 - 32 U/L Final     WBC   Date Value Ref Range Status   11/29/2023 5.93 3.40 - 10.80 10*3/mm3 Final   12/05/2022 4.1 3.4 - 10.8 x10E3/uL Final     Hematocrit   Date Value Ref Range Status   11/29/2023 41.7 34.0 - 46.6 % Final     Platelets   Date Value Ref Range Status   11/29/2023 189 140 - 450 10*3/mm3 Final     Triglycerides   Date Value Ref Range Status   12/05/2022 37 0 - 149 mg/dL Final     HDL Cholesterol   Date Value Ref Range Status   12/05/2022 63 >39 mg/dL Final     LDL Chol Calc ([facility])   Date Value Ref Range Status   12/05/2022 65 0 - 99 mg/dL Final      Hemoglobin A1C   Date Value Ref Range Status   12/05/2022 5.2 4.8 - 5.6 % Final     Comment:              Prediabetes: 5.7 - 6.4           Diabetes: >6.4           Glycemic control for adults with diabetes: <7.0           Assessment / Plan     Assessment/Plan     Diagnoses and all orders for this visit:    1. Bronchitis (Primary)  -     levoFLOXacin (Levaquin) 750 MG tablet; Take 1 tablet by mouth Daily for 7 days.  Dispense: 7 tablet; Refill: 0  -     predniSONE (DELTASONE) 10 MG (21) dose pack; Use as directed on package  Dispense: 1 each; Refill: 0    2. Overweight (BMI 25.0-29.9)         An After Visit Summary was printed and given to the patient at discharge.  Return if symptoms worsen or fail to improve.    I have discussed the patient results/orders and plan/recommendation with them at today's visit.      Nicol Paula, APRN   03/25/2024

## 2024-04-09 ENCOUNTER — OFFICE VISIT (OUTPATIENT)
Dept: FAMILY MEDICINE CLINIC | Facility: CLINIC | Age: 29
End: 2024-04-09
Payer: OTHER GOVERNMENT

## 2024-04-09 VITALS
RESPIRATION RATE: 16 BRPM | HEART RATE: 74 BPM | WEIGHT: 176.8 LBS | TEMPERATURE: 98 F | BODY MASS INDEX: 26.19 KG/M2 | DIASTOLIC BLOOD PRESSURE: 83 MMHG | HEIGHT: 69 IN | SYSTOLIC BLOOD PRESSURE: 113 MMHG

## 2024-04-09 DIAGNOSIS — B37.31 YEAST INFECTION OF THE VAGINA: Primary | ICD-10-CM

## 2024-04-09 PROCEDURE — 99213 OFFICE O/P EST LOW 20 MIN: CPT

## 2024-04-09 RX ORDER — FLUCONAZOLE 150 MG/1
150 TABLET ORAL ONCE
Qty: 2 TABLET | Refills: 0 | Status: SHIPPED | OUTPATIENT
Start: 2024-04-09 | End: 2024-04-09

## 2024-04-09 NOTE — PROGRESS NOTES
Chief Complaint  Vaginal Discharge (Possible UTI from recent antibiotics taken)    Subjective        Rena Quiroz presents to [facility]  History of Present Illness  Patient is a 28-year-old female presenting with vaginal complaints. Previously treated with antibiotic for bronchitis 3/25/2024. Saturday started having complaints of vaginal itching. Has burning with urination but reports it does not feel like a UTI. Has had relief with use of cool cloths. Denies frequency, urgency, or abnormal vaginal discharge.     Past Medical History:   Diagnosis Date    Abnormal Pap smear of cervix 2018    Adverse effect of anesthesia     corneal abrasions    Anemia     Anxiety     Depression     Headache     Obesity     Personal history of COVID-19     Polycystic ovary syndrome     PONV (postoperative nausea and vomiting)     Prediabetes      Past Surgical History:   Procedure Laterality Date    BARIATRIC SURGERY      BREAST RECONSTRUCTION Bilateral 2023    Procedure: SECOND STAGE BILATERAL BREAST RECONSTRUCTION  WITH IMPLANT EXCHANGE;  Surgeon: Juan J Mcneal MD;  Location: [facility] OR;  Service: Plastics;  Laterality: Bilateral;    BREAST RECONSTRUCTION, BREAST TISSUE EXPANDER INSERTION Bilateral 08/15/2023    Procedure: BILATERAL FIRST STAGE BREAST RECONSTRUCTION WITH  TISSUE EXPANDER PLACEMENT;  Surgeon: Juan J Mcneal MD;  Location: [facility] OR;  Service: Plastics;  Laterality: Bilateral;     SECTION      tubal in addition    CHOLECYSTECTOMY      COLONOSCOPY N/A 2022    Procedure: COLONOSCOPY WITH ANESTHESIA;  Surgeon: Prashanth Isaac DO;  Location: [facility] ENDOSCOPY;  Service: Gastroenterology;  Laterality: N/A;  pre op: alt bowel fxn   post op:polyps  PCP: Nicol Paula APRN    COSMETIC SURGERY      tummy tuck    MASTECTOMY Bilateral 08/15/2023    Procedure: BILATERAL MASTECTOMY;  Surgeon: Sharita Chavira MD;  Location: [facility] OR;   "Service: General;  Laterality: Bilateral;    TONSILLECTOMY      VAGINAL HYSTERECTOMY  April 2021    Lap/vag assist due to menorrhagia     Social History     Socioeconomic History    Marital status:    Tobacco Use    Smoking status: Never     Passive exposure: Never    Smokeless tobacco: Never   Vaping Use    Vaping status: Never Used   Substance and Sexual Activity    Alcohol use: Never    Drug use: Never    Sexual activity: Defer     Birth control/protection: Hysterectomy     Family History   Problem Relation Age of Onset    Hypertension Father     Hyperlipidemia Father     Heart disease Father     Breast cancer Mother 41    Cancer Mother     Autism Son     Colon cancer Paternal Grandfather     Breast cancer Paternal Grandmother     Breast cancer Maternal Grandmother     Colon polyps Neg Hx     Esophageal cancer Neg Hx        Review of Systems  Review of systems is negative unless otherwise specified in HPI.    Objective   Vital Signs:  /83 (BP Location: Left arm, Patient Position: Sitting, Cuff Size: Adult)   Pulse 74   Temp 98 °F (36.7 °C) (Infrared)   Resp 16   Ht 175.3 cm (69\")   Wt 80.2 kg (176 lb 12.8 oz)   BMI 26.11 kg/m²   Estimated body mass index is 26.11 kg/m² as calculated from the following:    Height as of this encounter: 175.3 cm (69\").    Weight as of this encounter: 80.2 kg (176 lb 12.8 oz).          Physical Exam  Vitals and nursing note reviewed.   Constitutional:       General: She is not in acute distress.     Appearance: Normal appearance. She is not ill-appearing.   HENT:      Head: Normocephalic.      Nose: Nose normal.      Mouth/Throat:      Mouth: Mucous membranes are moist.      Pharynx: Oropharynx is clear.   Eyes:      Pupils: Pupils are equal, round, and reactive to light.   Cardiovascular:      Rate and Rhythm: Normal rate.   Pulmonary:      Effort: Pulmonary effort is normal. No respiratory distress.   Abdominal:      Palpations: Abdomen is soft. "   Musculoskeletal:         General: Normal range of motion.      Cervical back: Normal range of motion.   Skin:     General: Skin is warm and dry.   Neurological:      General: No focal deficit present.      Mental Status: She is alert.          Result Review :                   Assessment and Plan   Diagnoses and all orders for this visit:    1. Yeast infection of the vagina (Primary)  -     fluconazole (Diflucan) 150 MG tablet; Take 1 tablet by mouth 1 (One) Time for 1 dose. May take second dose if not resolved in 3 days.  Dispense: 2 tablet; Refill: 0      - Will treat with diflucan for yeast infection at this time.          Follow Up   Return for Annual physical.  Patient was given instructions and counseling regarding her condition or for health maintenance advice. Please see specific information pulled into the AVS if appropriate.     Signed by:    MEGHANA Turner Date: 04/09/24

## 2024-05-29 ENCOUNTER — PRE-ADMISSION TESTING (OUTPATIENT)
Dept: PREADMISSION TESTING | Facility: HOSPITAL | Age: 29
End: 2024-05-29
Payer: OTHER GOVERNMENT

## 2024-05-29 VITALS
SYSTOLIC BLOOD PRESSURE: 114 MMHG | DIASTOLIC BLOOD PRESSURE: 80 MMHG | RESPIRATION RATE: 18 BRPM | HEART RATE: 64 BPM | WEIGHT: 173.72 LBS | HEIGHT: 69 IN | BODY MASS INDEX: 25.73 KG/M2 | OXYGEN SATURATION: 100 %

## 2024-05-29 LAB
ANION GAP SERPL CALCULATED.3IONS-SCNC: 8 MMOL/L (ref 5–15)
BUN SERPL-MCNC: 8 MG/DL (ref 6–20)
BUN/CREAT SERPL: 14.3 (ref 7–25)
CALCIUM SPEC-SCNC: 9.1 MG/DL (ref 8.6–10.5)
CHLORIDE SERPL-SCNC: 104 MMOL/L (ref 98–107)
CO2 SERPL-SCNC: 28 MMOL/L (ref 22–29)
CREAT SERPL-MCNC: 0.56 MG/DL (ref 0.57–1)
DEPRECATED RDW RBC AUTO: 39.6 FL (ref 37–54)
EGFRCR SERPLBLD CKD-EPI 2021: 125.3 ML/MIN/1.73
ERYTHROCYTE [DISTWIDTH] IN BLOOD BY AUTOMATED COUNT: 12.7 % (ref 12.3–15.4)
GLUCOSE SERPL-MCNC: 92 MG/DL (ref 65–99)
HCT VFR BLD AUTO: 42 % (ref 34–46.6)
HGB BLD-MCNC: 13.3 G/DL (ref 12–15.9)
MCH RBC QN AUTO: 27.1 PG (ref 26.6–33)
MCHC RBC AUTO-ENTMCNC: 31.7 G/DL (ref 31.5–35.7)
MCV RBC AUTO: 85.5 FL (ref 79–97)
PLATELET # BLD AUTO: 208 10*3/MM3 (ref 140–450)
PMV BLD AUTO: 10.6 FL (ref 6–12)
POTASSIUM SERPL-SCNC: 3.9 MMOL/L (ref 3.5–5.2)
RBC # BLD AUTO: 4.91 10*6/MM3 (ref 3.77–5.28)
SODIUM SERPL-SCNC: 140 MMOL/L (ref 136–145)
WBC NRBC COR # BLD AUTO: 4.58 10*3/MM3 (ref 3.4–10.8)

## 2024-05-29 PROCEDURE — 85027 COMPLETE CBC AUTOMATED: CPT

## 2024-05-29 PROCEDURE — 80048 BASIC METABOLIC PNL TOTAL CA: CPT

## 2024-05-29 PROCEDURE — 36415 COLL VENOUS BLD VENIPUNCTURE: CPT

## 2024-05-29 NOTE — DISCHARGE INSTRUCTIONS
Preparing for Surgery  Follow these instructions before the procedure:  Several days or weeks before your procedure  Ask your health care provider about:  Changing or stopping your regular medicines. This is especially important if you are taking diabetes medicines or blood thinners.  Taking medicines such as aspirin and ibuprofen. These medicines can thin your blood. Do not take these medicines unless your health care provider tells you to take them.  Taking over-the-counter medicines, vitamins, herbs, and supplements.    Contact your surgeon if you:  Develop a fever of more than 100.4°F (38°C) or other feelings of illness during the 48 hours before your surgery.  Have symptoms that get worse.  Have questions or concerns about your surgery.  If you are going home the same day of your surgery you will need to arrange for a responsible adult, age 18 years old or older, to drive you home from the hospital and stay with you for 24 hours. Verification of the  will be made prior to any procedure requiring sedation. You may not go home in a taxi or any form of public transportation by yourself.     Day before your procedure  24 hours before your procedure DO NOT drink alcoholic beverages or smoke.  24 hours before your procedure STOP taking Erectile Dysfunction medication (i.e.,Cialis, Viagra)   You may be asked to shower with a germ-killing soap.  Day of your procedure   You may take the following medication(s) the morning of surgery with a sip of water: KLONOPIN       8 hours before your procedure STOP all food, any dairy products, and full liquids. This includes hard candy, chewing gum or mints. This is extremely important to prevent serious complications.     Up to 2 hours before your scheduled arrival time, you may have clear liquids no cream, powder, or pulp of any kind. Safe options are water, black coffee, plain tea, soda, Gatorade/Powerade, clear broth, apple juice.    2 hours before your scheduled arrival  time, STOP drinking clear liquids.    You may need to take another shower with a germ-killing soap before you leave home in the morning. Do not use perfumes, colognes, or body lotions.  Wear comfortable loose-fitting clothing.  Remove all jewelry including body piercing and rings, dark colored nail polish, and make up prior to arrival at the hospital. Leave all valuables at home.   Bring your hearing aids if you rely on them.  Do not wear contact lenses. If you wear eyeglasses remember to bring a case to store them in while you are in surgery.  Do not use denture adhesives since you will be asked to remove them during your surgery.    You do not need to bring your home medications into the hospital.   Bring your sleep apnea device with you on the day of your surgery (if this applies to you).  If you wear portable oxygen, bring it with you.   If you are staying overnight, you may bring a bag of items you may need such as slippers, robe and a change of clothes for your discharge. You may want to leave these items in the car until you are ready for them since your family will take your belongings when you leave the pre-operative area.  Arrive at the hospital as scheduled by the office. You will be asked to arrive 2 hours prior to your surgery time in order to prepare for your procedure.  When you arrive at the hospital  Go to the registration desk located at the main entrance of the hospital.  After registration is completed, you will be given a beeper and a sticker sheet. Take the stickers to Outpatient Surgery and place in the tray at the end of the desk to notify the staff that you have arrived and registered.   Return to the lobby to wait. You are not always called back according to the time of arrival but rather the time your doctor will be ready.  When your beeper lights up and vibrates proceed through the double doors, under the stairs, and a member of the Outpatient Surgery staff will escort you to your  preoperative room.     How to Use Chlorhexidine Before Surgery  Chlorhexidine gluconate (CHG) is a germ-killing (antiseptic) solution that is used to clean the skin. It can get rid of the bacteria that normally live on the skin and can keep them away for about 24 hours. To clean your skin with CHG, you may be given:  A CHG solution to use in the shower or as part of a sponge bath.  A prepackaged cloth that contains CHG.  Cleaning your skin with CHG may help lower the risk for infection:  While you are staying in the intensive care unit of the hospital.  If you have a vascular access, such as a central line, to provide short-term or long-term access to your veins.  If you have a catheter to drain urine from your bladder.  If you are on a ventilator. A ventilator is a machine that helps you breathe by moving air in and out of your lungs.  After surgery.  What are the risks?  Risks of using CHG include:  A skin reaction.  Hearing loss, if CHG gets in your ears and you have a perforated eardrum.  Eye injury, if CHG gets in your eyes and is not rinsed out.  The CHG product catching fire.  Make sure that you avoid smoking and flames after applying CHG to your skin.  Do not use CHG:  If you have a chlorhexidine allergy or have previously reacted to chlorhexidine.  On babies younger than 2 months of age.  How to use CHG solution  Use CHG only as told by your health care provider, and follow the instructions on the label.  Use the full amount of CHG as directed. Usually, this is one bottle.  During a shower    Follow these steps when using CHG solution during a shower (unless your health care provider gives you different instructions):  Start the shower.  Use your normal soap and shampoo to wash your face and hair.  Turn off the shower or move out of the shower stream.  Pour the CHG onto a clean washcloth. Do not use any type of brush or rough-edged sponge.  Starting at your neck, lather your body down to your toes. Make  sure you follow these instructions:  If you will be having surgery, pay special attention to the part of your body where you will be having surgery. Scrub this area for at least 1 minute.  Do not use CHG on your head or face. If the solution gets into your ears or eyes, rinse them well with water.  Avoid your genital area.  Avoid any areas of skin that have broken skin, cuts, or scrapes.  Scrub your back and under your arms. Make sure to wash skin folds.  Let the lather sit on your skin for 1-2 minutes or as long as told by your health care provider.  Thoroughly rinse your entire body in the shower. Make sure that all body creases and crevices are rinsed well.  Dry off with a clean towel. Do not put any substances on your body afterward--such as powder, lotion, or perfume--unless you are told to do so by your health care provider. Only use lotions that are recommended by the .  Put on clean clothes or pajamas.  If it is the night before your surgery, sleep in clean sheets.     During a sponge bath  Follow these steps when using CHG solution during a sponge bath (unless your health care provider gives you different instructions):  Use your normal soap and shampoo to wash your face and hair.  Pour the CHG onto a clean washcloth.  Starting at your neck, lather your body down to your toes. Make sure you follow these instructions:  If you will be having surgery, pay special attention to the part of your body where you will be having surgery. Scrub this area for at least 1 minute.  Do not use CHG on your head or face. If the solution gets into your ears or eyes, rinse them well with water.  Avoid your genital area.  Avoid any areas of skin that have broken skin, cuts, or scrapes.  Scrub your back and under your arms. Make sure to wash skin folds.  Let the lather sit on your skin for 1-2 minutes or as long as told by your health care provider.  Using a different clean, wet washcloth, thoroughly rinse your  entire body. Make sure that all body creases and crevices are rinsed well.  Dry off with a clean towel. Do not put any substances on your body afterward--such as powder, lotion, or perfume--unless you are told to do so by your health care provider. Only use lotions that are recommended by the .  Put on clean clothes or pajamas.  If it is the night before your surgery, sleep in clean sheets.  How to use CHG prepackaged cloths  Only use CHG cloths as told by your health care provider, and follow the instructions on the label.  Use the CHG cloth on clean, dry skin.  Do not use the CHG cloth on your head or face unless your health care provider tells you to.  When washing with the CHG cloth:  Avoid your genital area.  Avoid any areas of skin that have broken skin, cuts, or scrapes.  Before surgery    Follow these steps when using a CHG cloth to clean before surgery (unless your health care provider gives you different instructions):  Using the CHG cloth, vigorously scrub the part of your body where you will be having surgery. Scrub using a back-and-forth motion for 3 minutes. The area on your body should be completely wet with CHG when you are done scrubbing.  Do not rinse. Discard the cloth and let the area air-dry. Do not put any substances on the area afterward, such as powder, lotion, or perfume.  Put on clean clothes or pajamas.  If it is the night before your surgery, sleep in clean sheets.     For general bathing  Follow these steps when using CHG cloths for general bathing (unless your health care provider gives you different instructions).  Use a separate CHG cloth for each area of your body. Make sure you wash between any folds of skin and between your fingers and toes. Wash your body in the following order, switching to a new cloth after each step:  The front of your neck, shoulders, and chest.  Both of your arms, under your arms, and your hands.  Your stomach and groin area, avoiding the  genitals.  Your right leg and foot.  Your left leg and foot.  The back of your neck, your back, and your buttocks.  Do not rinse. Discard the cloth and let the area air-dry. Do not put any substances on your body afterward--such as powder, lotion, or perfume--unless you are told to do so by your health care provider. Only use lotions that are recommended by the .  Put on clean clothes or pajamas.  Contact a health care provider if:  Your skin gets irritated after scrubbing.  You have questions about using your solution or cloth.  You swallow any chlorhexidine. Call your local poison control center (1-246.810.6092 in the [place]  Get help right away if:  Your eyes itch badly, or they become very red or swollen.  Your skin itches badly and is red or swollen.  Your hearing changes.  You have trouble seeing.  You have swelling or tingling in your mouth or throat.  You have trouble breathing.  These symptoms may represent a serious problem that is an emergency. Do not wait to see if the symptoms will go away. Get medical help right away. Call your local emergency services (756 in the [place] Do not drive yourself to the hospital.  Summary  Chlorhexidine gluconate (CHG) is a germ-killing (antiseptic) solution that is used to clean the skin. Cleaning your skin with CHG may help to lower your risk for infection.  You may be given CHG to use for bathing. It may be in a bottle or in a prepackaged cloth to use on your skin. Carefully follow your health care provider's instructions and the instructions on the product label.  Do not use CHG if you have a chlorhexidine allergy.  Contact your health care provider if your skin gets irritated after scrubbing.  This information is not intended to replace advice given to you by your health care provider. Make sure you discuss any questions you have with your health care provider.  Document Revised: 04/17/2023 Document Reviewed: 02/28/2022  Elsevier Patient Education © 2023  [employer]

## 2024-06-03 ENCOUNTER — ANESTHESIA EVENT (OUTPATIENT)
Dept: PERIOP | Facility: HOSPITAL | Age: 29
End: 2024-06-03
Payer: OTHER GOVERNMENT

## 2024-06-04 ENCOUNTER — HOSPITAL ENCOUNTER (OUTPATIENT)
Facility: HOSPITAL | Age: 29
Setting detail: HOSPITAL OUTPATIENT SURGERY
Discharge: HOME | End: 2024-06-04
Attending: SURGERY | Admitting: SURGERY
Payer: OTHER GOVERNMENT

## 2024-06-04 ENCOUNTER — ANESTHESIA (OUTPATIENT)
Dept: PERIOP | Facility: HOSPITAL | Age: 29
End: 2024-06-04
Payer: OTHER GOVERNMENT

## 2024-06-04 VITALS
OXYGEN SATURATION: 100 % | RESPIRATION RATE: 16 BRPM | HEART RATE: 84 BPM | DIASTOLIC BLOOD PRESSURE: 69 MMHG | TEMPERATURE: 97.5 F | SYSTOLIC BLOOD PRESSURE: 106 MMHG

## 2024-06-04 DIAGNOSIS — Z90.13 ACQUIRED ABSENCE OF BREAST AND ABSENT NIPPLE, BILATERAL: Primary | ICD-10-CM

## 2024-06-04 PROCEDURE — 25010000002 ESMOLOL 100 MG/10ML SOLUTION

## 2024-06-04 PROCEDURE — 25010000002 CEFAZOLIN PER 500 MG: Performed by: SURGERY

## 2024-06-04 PROCEDURE — 25010000002 FENTANYL CITRATE (PF) 100 MCG/2ML SOLUTION

## 2024-06-04 PROCEDURE — 25010000002 GLYCOPYRROLATE 0.4 MG/2ML SOLUTION

## 2024-06-04 PROCEDURE — 25010000002 ONDANSETRON PER 1 MG

## 2024-06-04 PROCEDURE — 25010000002 PROPOFOL 10 MG/ML EMULSION

## 2024-06-04 PROCEDURE — 25010000002 GENTAMICIN PER 80 MG: Performed by: SURGERY

## 2024-06-04 PROCEDURE — C1789 PROSTHESIS, BREAST, IMP: HCPCS | Performed by: SURGERY

## 2024-06-04 PROCEDURE — 25010000002 MIDAZOLAM PER 1 MG: Performed by: ANESTHESIOLOGY

## 2024-06-04 PROCEDURE — 25810000003 LACTATED RINGERS PER 1000 ML: Performed by: SURGERY

## 2024-06-04 PROCEDURE — 25010000002 SUGAMMADEX 200 MG/2ML SOLUTION

## 2024-06-04 PROCEDURE — 25010000002 EPINEPHRINE PER 0.1 MG: Performed by: SURGERY

## 2024-06-04 DEVICE — SMOOTH MODERATE HIGH PROFILE, 635CC  SMOOTH ROUND SILICONE
Type: IMPLANTABLE DEVICE | Site: BREAST | Status: FUNCTIONAL
Brand: MENTOR MEMORYGEL BOOST BREAST IMPLANT

## 2024-06-04 RX ORDER — DROPERIDOL 2.5 MG/ML
0.62 INJECTION, SOLUTION INTRAMUSCULAR; INTRAVENOUS ONCE AS NEEDED
Status: DISCONTINUED | OUTPATIENT
Start: 2024-06-04 | End: 2024-06-04 | Stop reason: HOSPADM

## 2024-06-04 RX ORDER — SODIUM CHLORIDE, SODIUM LACTATE, POTASSIUM CHLORIDE, CALCIUM CHLORIDE 600; 310; 30; 20 MG/100ML; MG/100ML; MG/100ML; MG/100ML
100 INJECTION, SOLUTION INTRAVENOUS CONTINUOUS
Status: DISCONTINUED | OUTPATIENT
Start: 2024-06-04 | End: 2024-06-04 | Stop reason: HOSPADM

## 2024-06-04 RX ORDER — PROPOFOL 10 MG/ML
VIAL (ML) INTRAVENOUS AS NEEDED
Status: DISCONTINUED | OUTPATIENT
Start: 2024-06-04 | End: 2024-06-04 | Stop reason: SURG

## 2024-06-04 RX ORDER — ONDANSETRON 4 MG/1
4 TABLET, FILM COATED ORAL EVERY 8 HOURS PRN
Qty: 6 TABLET | Refills: 0 | Status: SHIPPED | OUTPATIENT
Start: 2024-06-04

## 2024-06-04 RX ORDER — FENTANYL CITRATE 50 UG/ML
50 INJECTION, SOLUTION INTRAMUSCULAR; INTRAVENOUS
Status: DISCONTINUED | OUTPATIENT
Start: 2024-06-04 | End: 2024-06-04 | Stop reason: HOSPADM

## 2024-06-04 RX ORDER — LIDOCAINE HYDROCHLORIDE 20 MG/ML
INJECTION, SOLUTION EPIDURAL; INFILTRATION; INTRACAUDAL; PERINEURAL AS NEEDED
Status: DISCONTINUED | OUTPATIENT
Start: 2024-06-04 | End: 2024-06-04 | Stop reason: SURG

## 2024-06-04 RX ORDER — HYDROCODONE BITARTRATE AND ACETAMINOPHEN 10; 325 MG/1; MG/1
1 TABLET ORAL EVERY 4 HOURS PRN
Status: DISCONTINUED | OUTPATIENT
Start: 2024-06-04 | End: 2024-06-04 | Stop reason: HOSPADM

## 2024-06-04 RX ORDER — SCOLOPAMINE TRANSDERMAL SYSTEM 1 MG/1
1 PATCH, EXTENDED RELEASE TRANSDERMAL ONCE
Status: DISCONTINUED | OUTPATIENT
Start: 2024-06-04 | End: 2024-06-04 | Stop reason: HOSPADM

## 2024-06-04 RX ORDER — FLUMAZENIL 0.1 MG/ML
0.2 INJECTION INTRAVENOUS AS NEEDED
Status: DISCONTINUED | OUTPATIENT
Start: 2024-06-04 | End: 2024-06-04 | Stop reason: HOSPADM

## 2024-06-04 RX ORDER — TRAMADOL HYDROCHLORIDE 50 MG/1
50 TABLET ORAL EVERY 6 HOURS PRN
Qty: 8 TABLET | Refills: 0 | Status: SHIPPED | OUTPATIENT
Start: 2024-06-04 | End: 2024-06-06

## 2024-06-04 RX ORDER — GLYCOPYRROLATE 0.2 MG/ML
INJECTION INTRAMUSCULAR; INTRAVENOUS AS NEEDED
Status: DISCONTINUED | OUTPATIENT
Start: 2024-06-04 | End: 2024-06-04 | Stop reason: SURG

## 2024-06-04 RX ORDER — ONDANSETRON 2 MG/ML
4 INJECTION INTRAMUSCULAR; INTRAVENOUS ONCE AS NEEDED
Status: DISCONTINUED | OUTPATIENT
Start: 2024-06-04 | End: 2024-06-04 | Stop reason: HOSPADM

## 2024-06-04 RX ORDER — LIDOCAINE HYDROCHLORIDE 10 MG/ML
0.5 INJECTION, SOLUTION EPIDURAL; INFILTRATION; INTRACAUDAL; PERINEURAL ONCE AS NEEDED
Status: DISCONTINUED | OUTPATIENT
Start: 2024-06-04 | End: 2024-06-04 | Stop reason: HOSPADM

## 2024-06-04 RX ORDER — BUPIVACAINE HCL/0.9 % NACL/PF 0.125 %
PLASTIC BAG, INJECTION (ML) EPIDURAL AS NEEDED
Status: DISCONTINUED | OUTPATIENT
Start: 2024-06-04 | End: 2024-06-04 | Stop reason: SURG

## 2024-06-04 RX ORDER — ROCURONIUM BROMIDE 10 MG/ML
INJECTION, SOLUTION INTRAVENOUS AS NEEDED
Status: DISCONTINUED | OUTPATIENT
Start: 2024-06-04 | End: 2024-06-04 | Stop reason: SURG

## 2024-06-04 RX ORDER — SODIUM CHLORIDE 9 MG/ML
40 INJECTION, SOLUTION INTRAVENOUS AS NEEDED
Status: DISCONTINUED | OUTPATIENT
Start: 2024-06-04 | End: 2024-06-04 | Stop reason: HOSPADM

## 2024-06-04 RX ORDER — MIDAZOLAM HYDROCHLORIDE 1 MG/ML
1 INJECTION INTRAMUSCULAR; INTRAVENOUS
Status: DISCONTINUED | OUTPATIENT
Start: 2024-06-04 | End: 2024-06-04 | Stop reason: HOSPADM

## 2024-06-04 RX ORDER — NALOXONE HCL 0.4 MG/ML
0.4 VIAL (ML) INJECTION AS NEEDED
Status: DISCONTINUED | OUTPATIENT
Start: 2024-06-04 | End: 2024-06-04 | Stop reason: HOSPADM

## 2024-06-04 RX ORDER — SODIUM CHLORIDE 0.9 % (FLUSH) 0.9 %
3 SYRINGE (ML) INJECTION AS NEEDED
Status: DISCONTINUED | OUTPATIENT
Start: 2024-06-04 | End: 2024-06-04 | Stop reason: HOSPADM

## 2024-06-04 RX ORDER — LABETALOL HYDROCHLORIDE 5 MG/ML
5 INJECTION, SOLUTION INTRAVENOUS
Status: DISCONTINUED | OUTPATIENT
Start: 2024-06-04 | End: 2024-06-04 | Stop reason: HOSPADM

## 2024-06-04 RX ORDER — SODIUM CHLORIDE, SODIUM LACTATE, POTASSIUM CHLORIDE, CALCIUM CHLORIDE 600; 310; 30; 20 MG/100ML; MG/100ML; MG/100ML; MG/100ML
1000 INJECTION, SOLUTION INTRAVENOUS CONTINUOUS
Status: DISCONTINUED | OUTPATIENT
Start: 2024-06-04 | End: 2024-06-04 | Stop reason: HOSPADM

## 2024-06-04 RX ORDER — CEPHALEXIN 500 MG/1
500 CAPSULE ORAL 3 TIMES DAILY
Qty: 9 CAPSULE | Refills: 0 | Status: SHIPPED | OUTPATIENT
Start: 2024-06-04 | End: 2024-06-07

## 2024-06-04 RX ORDER — ONDANSETRON 2 MG/ML
INJECTION INTRAMUSCULAR; INTRAVENOUS AS NEEDED
Status: DISCONTINUED | OUTPATIENT
Start: 2024-06-04 | End: 2024-06-04 | Stop reason: SURG

## 2024-06-04 RX ORDER — SODIUM CHLORIDE 0.9 % (FLUSH) 0.9 %
3 SYRINGE (ML) INJECTION EVERY 12 HOURS SCHEDULED
Status: DISCONTINUED | OUTPATIENT
Start: 2024-06-04 | End: 2024-06-04 | Stop reason: HOSPADM

## 2024-06-04 RX ORDER — ESMOLOL HYDROCHLORIDE 10 MG/ML
INJECTION INTRAVENOUS AS NEEDED
Status: DISCONTINUED | OUTPATIENT
Start: 2024-06-04 | End: 2024-06-04 | Stop reason: SURG

## 2024-06-04 RX ORDER — MAGNESIUM HYDROXIDE 1200 MG/15ML
LIQUID ORAL AS NEEDED
Status: DISCONTINUED | OUTPATIENT
Start: 2024-06-04 | End: 2024-06-04 | Stop reason: HOSPADM

## 2024-06-04 RX ORDER — FENTANYL CITRATE 50 UG/ML
INJECTION, SOLUTION INTRAMUSCULAR; INTRAVENOUS AS NEEDED
Status: DISCONTINUED | OUTPATIENT
Start: 2024-06-04 | End: 2024-06-04 | Stop reason: SURG

## 2024-06-04 RX ORDER — ACETAMINOPHEN 500 MG
1000 TABLET ORAL ONCE
Status: COMPLETED | OUTPATIENT
Start: 2024-06-04 | End: 2024-06-04

## 2024-06-04 RX ORDER — SODIUM CHLORIDE 0.9 % (FLUSH) 0.9 %
3-10 SYRINGE (ML) INJECTION AS NEEDED
Status: DISCONTINUED | OUTPATIENT
Start: 2024-06-04 | End: 2024-06-04 | Stop reason: HOSPADM

## 2024-06-04 RX ORDER — HYDROCODONE BITARTRATE AND ACETAMINOPHEN 5; 325 MG/1; MG/1
1 TABLET ORAL EVERY 4 HOURS PRN
Status: DISCONTINUED | OUTPATIENT
Start: 2024-06-04 | End: 2024-06-04 | Stop reason: HOSPADM

## 2024-06-04 RX ADMIN — Medication 100 MCG: at 08:47

## 2024-06-04 RX ADMIN — ONDANSETRON 4 MG: 2 INJECTION INTRAMUSCULAR; INTRAVENOUS at 08:51

## 2024-06-04 RX ADMIN — ESMOLOL HYDROCHLORIDE 20 MG: 10 INJECTION, SOLUTION INTRAVENOUS at 07:12

## 2024-06-04 RX ADMIN — Medication 100 MCG: at 08:04

## 2024-06-04 RX ADMIN — FENTANYL CITRATE 100 MCG: 50 INJECTION, SOLUTION INTRAMUSCULAR; INTRAVENOUS at 08:13

## 2024-06-04 RX ADMIN — Medication 150 MCG: at 08:55

## 2024-06-04 RX ADMIN — FENTANYL CITRATE 100 MCG: 50 INJECTION, SOLUTION INTRAMUSCULAR; INTRAVENOUS at 07:10

## 2024-06-04 RX ADMIN — LIDOCAINE HYDROCHLORIDE 100 MG: 20 INJECTION, SOLUTION EPIDURAL; INFILTRATION; INTRACAUDAL; PERINEURAL at 07:10

## 2024-06-04 RX ADMIN — Medication 50 MCG: at 07:55

## 2024-06-04 RX ADMIN — Medication 100 MCG: at 08:31

## 2024-06-04 RX ADMIN — SODIUM CHLORIDE, POTASSIUM CHLORIDE, SODIUM LACTATE AND CALCIUM CHLORIDE 1000 ML: 600; 310; 30; 20 INJECTION, SOLUTION INTRAVENOUS at 06:15

## 2024-06-04 RX ADMIN — Medication 100 MCG: at 08:39

## 2024-06-04 RX ADMIN — ACETAMINOPHEN 1000 MG: 500 TABLET, FILM COATED ORAL at 06:37

## 2024-06-04 RX ADMIN — CEFAZOLIN 2000 MG: 2 INJECTION, POWDER, FOR SOLUTION INTRAMUSCULAR; INTRAVENOUS at 07:16

## 2024-06-04 RX ADMIN — SUGAMMADEX 200 MG: 100 INJECTION, SOLUTION INTRAVENOUS at 09:15

## 2024-06-04 RX ADMIN — Medication 100 MCG: at 08:21

## 2024-06-04 RX ADMIN — MIDAZOLAM 1 MG: 1 INJECTION INTRAMUSCULAR; INTRAVENOUS at 07:02

## 2024-06-04 RX ADMIN — SCOPALAMINE 1 PATCH: 1 PATCH, EXTENDED RELEASE TRANSDERMAL at 06:37

## 2024-06-04 RX ADMIN — PROPOFOL 140 MG: 10 INJECTION, EMULSION INTRAVENOUS at 07:10

## 2024-06-04 RX ADMIN — HYDROCODONE BITARTRATE AND ACETAMINOPHEN 1 TABLET: 10; 325 TABLET ORAL at 10:01

## 2024-06-04 RX ADMIN — ROCURONIUM BROMIDE 50 MG: 10 INJECTION, SOLUTION INTRAVENOUS at 07:10

## 2024-06-04 RX ADMIN — GLYCOPYRROLATE 0.2 MG: 0.2 INJECTION INTRAMUSCULAR; INTRAVENOUS at 08:04

## 2024-06-04 NOTE — ANESTHESIA PREPROCEDURE EVALUATION
Anesthesia Evaluation     Patient summary reviewed   history of anesthetic complications:  PONV  NPO Solid Status: > 8 hours  NPO Liquid Status: > 8 hours           Airway   Mallampati: I  TM distance: >3 FB  Neck ROM: full  No difficulty expected  Dental - normal exam     Pulmonary - negative pulmonary ROS   (-) asthma, sleep apnea, not a smoker  Cardiovascular   Exercise tolerance: good (4-7 METS)    (-) hypertension, hyperlipidemia      Neuro/Psych  (+) headaches, psychiatric history Anxiety and Depression  (-) seizures, TIA, CVA  GI/Hepatic/Renal/Endo    (+) diabetes mellitus (borderline)  (-) liver disease, no renal disease    ROS Comment: S/p gastric bypass 2017    Musculoskeletal     Abdominal    Substance History      OB/GYN          Other      history of cancer (breast)                    Anesthesia Plan    ASA 2     general     (Pt reports severe bilateral corneal abrasions after last surgery, thought to be from dry eyes during surgery. Had vision loss and pain for two weeks.   Will pay close attention to eyes, place lubricant and tape carefully. )  intravenous induction     Anesthetic plan, risks, benefits, and alternatives have been provided, discussed and informed consent has been obtained with: patient.      CODE STATUS:

## 2024-06-04 NOTE — OP NOTE
BREAST RECONSTRUCTION REVISION, FAT GRAFTING  Procedure Report    Patient Name:  Rena Quiroz  YOB: 1995    Date of Surgery:  6/4/2024    Attending Surgeon: Juan J Mcneal MD     PROCEDURE:  1.  Bilateral breast fat grafting  2.  Bilateral breast implant exchange    PREOPERATIVE DIAGNOSIS:  1.  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  Deformity of reconstructed breast, bilateral    POSTOPERATIVE DIAGNOSIS:  Same    ANESTHESIA:  General    INDICATION FOR PROCEDURE:  This is a 29-year-old female with a history of implant-based breast reconstruction.  She has superior pole hollowing and superior implant rippling bilaterally.  We will proceed with implant exchange for higher profile implants as well as superior breast fat grafting as planned.    OPERATIVE FINDINGS:  Bilateral superior pole fat grafting was performed from an abdominal donor site.  Approximately 45 g of fat was transferred to each breast.    Previously placed gel implants were removed.  New 635 cc boost profile implants were placed bilaterally    DESCRIPTION OF PROCEDURE:  The patient was taken to the operating room and placed supine on the OR table.  General anesthesia was induced followed by endotracheal intubation.  Arms were abducted 90 degrees on armboards and were gently secured with Kerlix wraps.  The patient was prepped and draped in sterile fashion.  A timeout was performed with all team members.    I began by infiltrating approximately 120 mL of tumescent solution into the central lower abdomen and left lateral abdomen based on preoperatively marked target donor sites.  I then obtained approximately 100 and mL of Lipoaspirate from the abdominal donor sites and placed these on the back table to begin gravity separation.  Cannula sites were placed on the existing transverse abdominoplasty scar and the periumbilical scar.  After obtaining Lipo aspirate, these were repaired with 5-0 nylon suture and dressed  with antibiotic ointment, sterile gauze, and Tegaderm.    While awaiting gravity separation, I turned my attention to the right breast.  I performed excision of the existing transverse scar including approximately 1 cm of additional skin resection to facilitate a modest degree of skin tightening.  This tissue was discarded.  I entered the implant pocket with electrocautery and removed the current implant.  The implant pocket was free of abnormalities.  I released to the superior sulcus and then irrigated the implant pocket with Betadine containing triple antibiotic solution.  I moved to the left breast and proceeded in mirror-image fashion.    Next I began the fat transfer.  I focused the transfer on the superior sulcus and superior pole of the breast bilaterally.  I transferred fat between the capsule and the breast skin in the native the subcutaneous fat plane.  I transferred approximately 45 g of fat per breast.    I then placed the new boost profile implants.  I began on the right side and performed a multilayered closure over the implant with interrupted 4-0 Vicryl in the capsule and subcutaneous fat, running deep dermal 4-0 Vicryl, and an intracuticular 4-0 Monocryl.  I then moved to the left side and proceeded in mirror-image fashion.  The breast incisions were dressed with Steri-Strips, and a standard postoperative breast wrap was applied.    BLOOD LOSS:   10 mL    COMPLICATIONS:   None    SPECIMENS:          None    IMPLANTS:    Implant Name Type Inv. Item Serial No.  Lot No. LRB No. Used Action   MENTOR MEMORY GEL BREAST BOOST IMPLANT, SMOOTH MODERATE HIGH PROFILE SILICONE GEL-FILLED BREAST IMPLANT     [id number] Right 1 Implanted   MENTOR MEMORY GEL BREAST BOOST IMPLANT SMOOTH MODERATE HIGH PROFILE SILICONE GEL-FILLED BREAST IMPLANT     [id number] Left 1 Implanted       DISPOSITION:   Home and self-care.  Return to clinic tomorrow.    STAFF:  Surgeon(s):  Juan J Mcneal MD            Electronically signed by Juan J Mcneal MD, 06/04/24, 9:19 AM CDT.

## 2024-06-04 NOTE — DISCHARGE INSTRUCTIONS
Breast Surgery Postoperative Instructions  Juan J Mcneal MD    DRESSING CARE:  Please keep your breast wrap in place until you return to clinic.    It is important for this dressing to remain clean and dry.      ACTIVITY:  Please spend at least 5 to 10 minutes on your feet every hour from the time you discharge until the time you normally go to bed.  You do not have to wake up through the night to perform this activity.  Please do not lift more than 10 pounds.    Do not stretch behind your back.    Do not reach over your head.      CLINIC FOLLOW UP:  You will have scheduled follow-up in my clinic tomorrow.    Please be sure that this appointment time has been confirmed before you leave the hospital. If you have not been given an appointment time, please call my office to schedule an appointment.    MEDICATIONS:  Please take your prescription pain medication only as needed.    It is ok to take over-the-counter pain medications as well per package instructions.    Please wean off of your prescription pain medication as soon as possible.  If you have been prescribed antibiotics, please take them as instructed.    DRAIN CARE:  If you have drains in place, please follow the instructions provided by your nurse for drain care.  Please keep a very accurate record of your drain output for each individual drain.    Questions or Concerns    If you have additional questions or concerns, please contact [facility] at [phone] during or after office hours. After hours, you will have the option to press #1 to speak to the answering service.  They will be able to put you in touch with Dr. Mcneal if necessary.     In the case of an emergency, please call [phone].    Signs and Symptoms of Infection and/or Complication:  Rapid or asymmetric swelling  Swelling that worsens after the first 48 hours  Redness and warmth developing on previously normal-appearing skin  Drainage other than scant blood or  blood-tinged clear fluid  Fever or chills  Nausea and vomiting or diarrhea   Separation of the incision  Bleeding that does not stop with pressure    Signs and Symptoms of a Potential Emergency:    If you experience any of the following during your postoperative recovery, this may represent an emergency.  Please call [phone] and seek immediate medical attention:    Loss of consciousness or “blacking out”  Worsening shortness of breath or inability to catch your breath  No onset chest, shoulder, or neck pain  Leg pain or swelling  Light-headedness or dizziness when attempting to stand or walk

## 2024-06-04 NOTE — ANESTHESIA PROCEDURE NOTES
Airway  Urgency: elective    Date/Time: 6/4/2024 7:12 AM  Airway not difficult    General Information and Staff    Patient location during procedure: OR    Indications and Patient Condition  Indications for airway management: airway protection    Preoxygenated: yes  Mask difficulty assessment: 1 - vent by mask    Final Airway Details  Final airway type: endotracheal airway      Successful airway: ETT  Cuffed: yes   Successful intubation technique: direct laryngoscopy  Blade: Lucero  Blade size: 2  ETT size (mm): 7.5  Cormack-Lehane Classification: grade I - full view of glottis  Placement verified by: chest auscultation   Cuff volume (mL): 6  Measured from: lips  ETT/EBT  to lips (cm): 23  Number of attempts at approach: 1  Assessment: lips, teeth, and gum same as pre-op and atraumatic intubation    Additional Comments  Intubated by Misbah Loo SRNA

## 2024-06-04 NOTE — ANESTHESIA POSTPROCEDURE EVALUATION
Patient: Rena Quiroz    Procedure Summary       Date: 06/04/24 Room / Location:  [facility] OR  /  [facility]    Anesthesia Start: 0706 Anesthesia Stop: 0928    Procedures:       BILATERAL REVISION BREAST RECONSTRUCTION WITH IMPLANT EXCHANGE AND BILATERAL BREAST FAT GRAFTING FROM ARM AND ABDOMINAL DONOR SITES (Bilateral: Breast)      BILATERAL BREAST FAT GRAFTING FROM ARM AND ABDOMINAL DONOR SITES (Bilateral) Diagnosis:     Surgeons: Juan J Mcneal MD Provider: Silver Starr CRNA    Anesthesia Type: general ASA Status: 2            Anesthesia Type: general    Vitals  Vitals Value Taken Time   /72 06/04/24 1009   Temp 97.5 °F (36.4 °C) 06/04/24 1000   Pulse 101 06/04/24 1009   Resp 16 06/04/24 1009   SpO2 100 % 06/04/24 1009           Post Anesthesia Care and Evaluation    Patient location during evaluation: PACU  Patient participation: complete - patient participated  Level of consciousness: awake and alert  Pain management: adequate    Airway patency: patent  Anesthetic complications: No anesthetic complications    Cardiovascular status: acceptable  Respiratory status: acceptable  Hydration status: acceptable    Comments: Blood pressure 113/75, pulse 84, temperature 97.5 °F (36.4 °C), resp. rate 16, SpO2 100%, not currently breastfeeding.    Pt discharged from PACU based on angie score >8

## 2024-08-21 ENCOUNTER — OFFICE VISIT (OUTPATIENT)
Dept: SURGERY | Facility: CLINIC | Age: 29
End: 2024-08-21
Payer: OTHER GOVERNMENT

## 2024-08-21 VITALS
DIASTOLIC BLOOD PRESSURE: 72 MMHG | HEART RATE: 71 BPM | HEIGHT: 69 IN | SYSTOLIC BLOOD PRESSURE: 105 MMHG | BODY MASS INDEX: 26.33 KG/M2 | WEIGHT: 177.8 LBS | OXYGEN SATURATION: 99 %

## 2024-08-21 DIAGNOSIS — Z90.13 ACQUIRED ABSENCE OF BREAST AND ABSENT NIPPLE, BILATERAL: Primary | ICD-10-CM

## 2024-08-21 DIAGNOSIS — Z90.13 S/P BILATERAL MASTECTOMY: ICD-10-CM

## 2024-08-21 DIAGNOSIS — E66.3 OVERWEIGHT WITH BODY MASS INDEX (BMI) OF 26 TO 26.9 IN ADULT: ICD-10-CM

## 2024-08-21 DIAGNOSIS — Z91.89 INCREASED RISK OF BREAST CANCER: ICD-10-CM

## 2024-08-21 NOTE — PROGRESS NOTES
Office Established Patient Note:     Referring Provider: No ref. provider found    Chief Complaint   Patient presents with    Follow-up       Subjective .     History of present illness:    History of Present Illness  The patient presents for a 1-year follow-up post bilateral mastectomy.    She reports significant rippling, particularly when she leans forward. Despite undergoing liposuction and fat grafting, the issue persists. Dr. Mcneal has suggested repeating the procedure, and if unsuccessful, considering an under-the-muscle approach.     She has observed that one of her implants frequently flips, resulting in a flat appearance on that side. She finds this bothersome and has to manually adjust it back into place.    She reports no skin changes or complications with her incisions. She also does not experience any unusual sensations in her breasts or underarms.    FAMILY HISTORY  She denies any new family history of breast cancer.       History  Past Medical History:   Diagnosis Date    Abnormal Pap smear of cervix 2018    Adverse effect of anesthesia     corneal abrasions    Anemia     Anxiety     Depression     Headache     Obesity     Personal history of COVID-19 2021    Polycystic ovary syndrome 2008    PONV (postoperative nausea and vomiting)     Prediabetes    ,   Past Surgical History:   Procedure Laterality Date    BARIATRIC SURGERY      BREAST RECONSTRUCTION Bilateral 12/12/2023    Procedure: SECOND STAGE BILATERAL BREAST RECONSTRUCTION  WITH IMPLANT EXCHANGE;  Surgeon: Juan J Mcneal MD;  Location: [facility] OR;  Service: Plastics;  Laterality: Bilateral;    BREAST RECONSTRUCTION Bilateral 6/4/2024    Procedure: BILATERAL REVISION BREAST RECONSTRUCTION WITH IMPLANT EXCHANGE AND BILATERAL BREAST FAT GRAFTING FROM ARM AND ABDOMINAL DONOR SITES;  Surgeon: Juan J Mcneal MD;  Location: [facility] OR;  Service: Plastics;  Laterality: Bilateral;    BREAST RECONSTRUCTION, BREAST TISSUE EXPANDER INSERTION  Bilateral 08/15/2023    Procedure: BILATERAL FIRST STAGE BREAST RECONSTRUCTION WITH  TISSUE EXPANDER PLACEMENT;  Surgeon: Juan J Mcneal MD;  Location:  [facility] OR;  Service: Plastics;  Laterality: Bilateral;     SECTION      tubal in addition    CHOLECYSTECTOMY      COLONOSCOPY N/A 2022    Procedure: COLONOSCOPY WITH ANESTHESIA;  Surgeon: Prashanth Isaac DO;  Location:  [facility];  Service: Gastroenterology;  Laterality: N/A;  pre op: alt bowel fxn   post op:polyps  PCP: Nicol Paula APRN    COSMETIC SURGERY      tummy tuck    FAT GRAFTING Bilateral 2024    Procedure: BILATERAL BREAST FAT GRAFTING FROM ARM AND ABDOMINAL DONOR SITES;  Surgeon: Juan J Mcneal MD;  Location:  [facility] OR;  Service: Plastics;  Laterality: Bilateral;    MASTECTOMY Bilateral 08/15/2023    Procedure: BILATERAL MASTECTOMY;  Surgeon: Sharita Chavira MD;  Location:  [facility] OR;  Service: General;  Laterality: Bilateral;    TONSILLECTOMY      VAGINAL HYSTERECTOMY  2021    Lap/vag assist due to menorrhagia   ,   Family History   Problem Relation Age of Onset    Hypertension Father     Hyperlipidemia Father     Heart disease Father     Breast cancer Mother 41    Cancer Mother     Autism Son     Colon cancer Paternal Grandfather     Breast cancer Paternal Grandmother     Breast cancer Maternal Grandmother     Colon polyps Neg Hx     Esophageal cancer Neg Hx    ,   Social History     Tobacco Use    Smoking status: Never     Passive exposure: Never    Smokeless tobacco: Never   Vaping Use    Vaping status: Never Used   Substance Use Topics    Alcohol use: Never    Drug use: Never   , (Not in a hospital admission)   and Allergies:  Nsaids    Current Outpatient Medications:     clonazePAM (KlonoPIN) 0.5 MG tablet, Take 1 tablet by mouth 2 (Two) Times a Day As Needed for Anxiety., Disp: 60 tablet, Rfl: 0    diphenhydrAMINE (BENADRYL) 25 mg capsule, Take 1 capsule by mouth At Night As Needed for  "Allergies., Disp: , Rfl:     ondansetron (Zofran) 4 MG tablet, Take 1 tablet by mouth Every 8 (Eight) Hours As Needed for Vomiting or Nausea. (Patient not taking: Reported on 8/21/2024), Disp: 6 tablet, Rfl: 0    sertraline (ZOLOFT) 50 MG tablet, Take 75 mg daily for 1 week.  Then take 100 mg daily. (Patient not taking: Reported on 4/9/2024), Disp: 50 tablet, Rfl: 2      Objective     Vital Signs   /72 (BP Location: Left arm, Patient Position: Sitting, Cuff Size: Adult)   Pulse 71   Ht 175.3 cm (69\")   Wt 80.6 kg (177 lb 12.8 oz)   SpO2 99%   BMI 26.26 kg/m²      Physical Exam:  Physical Exam  Implants in the breasts are intact. Incisions on both breasts well healed. No palpable masses under the breasts or under the arm.       Results Review:    The following data was reviewed by: Sharita Chavira MD on 08/21/2024:  Patient discussed with Dr. Mcneal.     Results         Assessment & Plan       Diagnoses and all orders for this visit:    1. Acquired absence of breast and absent nipple, bilateral (Primary)    2. Increased risk of breast cancer    3. S/P bilateral mastectomy    4. Overweight with body mass index (BMI) of 26 to 26.9 in adult       Assessment & Plan  1. Post bilateral mastectomy status.  She is one year postoperative from her bilateral mastectomy. She reports no skin changes, issues with incisions, or new family history of breast cancer. A clinical examination today shows that both incisions look excellent, and there are no palpable abnormalities under the breasts or arms. She mentions experiencing rippling and flipping of the implant, which is being managed by her surgeon, Dr. Mcneal.    Follow-up  She will follow up in 1 year.       BMI is >= 25 and <30. (Overweight) The following options were offered after discussion;: weight loss educational material (shared in after visit summary)      Sharita Chavira MD  08/22/24  20:25 CDT    Patient or patient representative verbalized consent " for the use of Ambient Listening during the visit with  Sharita Chavira MD for chart documentation. 8/22/2024  20:26 CDT

## 2024-08-23 NOTE — PATIENT INSTRUCTIONS

## 2024-09-17 ENCOUNTER — PRE-ADMISSION TESTING (OUTPATIENT)
Dept: PREADMISSION TESTING | Facility: HOSPITAL | Age: 29
End: 2024-09-17
Payer: OTHER GOVERNMENT

## 2024-09-17 VITALS
BODY MASS INDEX: 26.91 KG/M2 | SYSTOLIC BLOOD PRESSURE: 114 MMHG | RESPIRATION RATE: 18 BRPM | OXYGEN SATURATION: 100 % | HEIGHT: 69 IN | DIASTOLIC BLOOD PRESSURE: 68 MMHG | WEIGHT: 181.66 LBS | HEART RATE: 73 BPM

## 2024-09-17 LAB
ANION GAP SERPL CALCULATED.3IONS-SCNC: 9 MMOL/L (ref 5–15)
BUN SERPL-MCNC: 9 MG/DL (ref 6–20)
BUN/CREAT SERPL: 14.8 (ref 7–25)
CALCIUM SPEC-SCNC: 8.3 MG/DL (ref 8.6–10.5)
CHLORIDE SERPL-SCNC: 104 MMOL/L (ref 98–107)
CO2 SERPL-SCNC: 26 MMOL/L (ref 22–29)
CREAT SERPL-MCNC: 0.61 MG/DL (ref 0.57–1)
DEPRECATED RDW RBC AUTO: 43.2 FL (ref 37–54)
EGFRCR SERPLBLD CKD-EPI 2021: 122.8 ML/MIN/1.73
ERYTHROCYTE [DISTWIDTH] IN BLOOD BY AUTOMATED COUNT: 13.2 % (ref 12.3–15.4)
GLUCOSE SERPL-MCNC: 69 MG/DL (ref 65–99)
HCT VFR BLD AUTO: 42 % (ref 34–46.6)
HGB BLD-MCNC: 13.2 G/DL (ref 12–15.9)
MCH RBC QN AUTO: 28.1 PG (ref 26.6–33)
MCHC RBC AUTO-ENTMCNC: 31.4 G/DL (ref 31.5–35.7)
MCV RBC AUTO: 89.4 FL (ref 79–97)
PLATELET # BLD AUTO: 191 10*3/MM3 (ref 140–450)
PMV BLD AUTO: 10.8 FL (ref 6–12)
POTASSIUM SERPL-SCNC: 4.1 MMOL/L (ref 3.5–5.2)
RBC # BLD AUTO: 4.7 10*6/MM3 (ref 3.77–5.28)
SODIUM SERPL-SCNC: 139 MMOL/L (ref 136–145)
WBC NRBC COR # BLD AUTO: 6 10*3/MM3 (ref 3.4–10.8)

## 2024-09-17 PROCEDURE — 36415 COLL VENOUS BLD VENIPUNCTURE: CPT

## 2024-09-17 PROCEDURE — 80048 BASIC METABOLIC PNL TOTAL CA: CPT

## 2024-09-17 PROCEDURE — 85027 COMPLETE CBC AUTOMATED: CPT

## 2024-09-24 ENCOUNTER — ANESTHESIA EVENT (OUTPATIENT)
Dept: PERIOP | Facility: HOSPITAL | Age: 29
End: 2024-09-24
Payer: OTHER GOVERNMENT

## 2024-09-24 ENCOUNTER — HOSPITAL ENCOUNTER (OUTPATIENT)
Facility: HOSPITAL | Age: 29
Setting detail: HOSPITAL OUTPATIENT SURGERY
Discharge: HOME | End: 2024-09-24
Attending: SURGERY | Admitting: SURGERY
Payer: OTHER GOVERNMENT

## 2024-09-24 ENCOUNTER — ANESTHESIA (OUTPATIENT)
Dept: PERIOP | Facility: HOSPITAL | Age: 29
End: 2024-09-24
Payer: OTHER GOVERNMENT

## 2024-09-24 VITALS
RESPIRATION RATE: 16 BRPM | TEMPERATURE: 97.8 F | HEART RATE: 99 BPM | SYSTOLIC BLOOD PRESSURE: 121 MMHG | OXYGEN SATURATION: 98 % | DIASTOLIC BLOOD PRESSURE: 88 MMHG

## 2024-09-24 DIAGNOSIS — Z90.13 ACQUIRED ABSENCE OF BREAST AND ABSENT NIPPLE, BILATERAL: Primary | ICD-10-CM

## 2024-09-24 PROCEDURE — 25810000003 LACTATED RINGERS PER 1000 ML: Performed by: SURGERY

## 2024-09-24 PROCEDURE — 25010000002 DROPERIDOL PER 5 MG

## 2024-09-24 PROCEDURE — 25010000002 CEFAZOLIN PER 500 MG: Performed by: SURGERY

## 2024-09-24 PROCEDURE — 25010000002 FENTANYL CITRATE (PF) 50 MCG/ML SOLUTION: Performed by: ANESTHESIOLOGY

## 2024-09-24 PROCEDURE — 25010000002 SUGAMMADEX 200 MG/2ML SOLUTION

## 2024-09-24 PROCEDURE — 25010000002 MIDAZOLAM PER 1MG: Performed by: ANESTHESIOLOGY

## 2024-09-24 PROCEDURE — 25010000002 EPINEPHRINE PER 0.1 MG: Performed by: SURGERY

## 2024-09-24 PROCEDURE — 25010000002 PROPOFOL 10 MG/ML EMULSION

## 2024-09-24 PROCEDURE — 25010000002 GENTAMICIN PER 80 MG: Performed by: SURGERY

## 2024-09-24 PROCEDURE — 25010000002 CEFAZOLIN PER 500 MG

## 2024-09-24 PROCEDURE — 25010000002 DEXAMETHASONE PER 1 MG

## 2024-09-24 PROCEDURE — 25010000002 DEXAMETHASONE PER 1 MG: Performed by: ANESTHESIOLOGY

## 2024-09-24 PROCEDURE — 25010000002 BUPIVACAINE 0.25 % SOLUTION: Performed by: SURGERY

## 2024-09-24 PROCEDURE — 25010000002 ONDANSETRON PER 1 MG

## 2024-09-24 RX ORDER — PROPOFOL 10 MG/ML
VIAL (ML) INTRAVENOUS AS NEEDED
Status: DISCONTINUED | OUTPATIENT
Start: 2024-09-24 | End: 2024-09-24 | Stop reason: SURG

## 2024-09-24 RX ORDER — MIDAZOLAM HYDROCHLORIDE 2 MG/2ML
2 INJECTION, SOLUTION INTRAMUSCULAR; INTRAVENOUS ONCE
Status: COMPLETED | OUTPATIENT
Start: 2024-09-24 | End: 2024-09-24

## 2024-09-24 RX ORDER — FLUCONAZOLE 150 MG/1
150 TABLET ORAL ONCE
Qty: 1 TABLET | Refills: 0 | Status: SHIPPED | OUTPATIENT
Start: 2024-09-24 | End: 2024-09-25

## 2024-09-24 RX ORDER — CEFAZOLIN SODIUM 1 G/3ML
INJECTION, POWDER, FOR SOLUTION INTRAMUSCULAR; INTRAVENOUS AS NEEDED
Status: DISCONTINUED | OUTPATIENT
Start: 2024-09-24 | End: 2024-09-24 | Stop reason: SURG

## 2024-09-24 RX ORDER — FENTANYL CITRATE 50 UG/ML
25 INJECTION, SOLUTION INTRAMUSCULAR; INTRAVENOUS
Status: DISCONTINUED | OUTPATIENT
Start: 2024-09-24 | End: 2024-09-24 | Stop reason: HOSPADM

## 2024-09-24 RX ORDER — HYDROCODONE BITARTRATE AND ACETAMINOPHEN 10; 325 MG/1; MG/1
1 TABLET ORAL EVERY 4 HOURS PRN
Status: DISCONTINUED | OUTPATIENT
Start: 2024-09-24 | End: 2024-09-24 | Stop reason: HOSPADM

## 2024-09-24 RX ORDER — SODIUM CHLORIDE 0.9 % (FLUSH) 0.9 %
10 SYRINGE (ML) INJECTION AS NEEDED
Status: DISCONTINUED | OUTPATIENT
Start: 2024-09-24 | End: 2024-09-24 | Stop reason: HOSPADM

## 2024-09-24 RX ORDER — NALOXONE HCL 0.4 MG/ML
0.4 VIAL (ML) INJECTION AS NEEDED
Status: DISCONTINUED | OUTPATIENT
Start: 2024-09-24 | End: 2024-09-24 | Stop reason: HOSPADM

## 2024-09-24 RX ORDER — SODIUM CHLORIDE 0.9 % (FLUSH) 0.9 %
10 SYRINGE (ML) INJECTION EVERY 12 HOURS SCHEDULED
Status: DISCONTINUED | OUTPATIENT
Start: 2024-09-24 | End: 2024-09-24 | Stop reason: HOSPADM

## 2024-09-24 RX ORDER — DEXTROSE MONOHYDRATE 25 G/50ML
12.5 INJECTION, SOLUTION INTRAVENOUS AS NEEDED
Status: DISCONTINUED | OUTPATIENT
Start: 2024-09-24 | End: 2024-09-24 | Stop reason: HOSPADM

## 2024-09-24 RX ORDER — CEPHALEXIN 500 MG/1
500 CAPSULE ORAL 3 TIMES DAILY
Qty: 9 CAPSULE | Refills: 0 | Status: SHIPPED | OUTPATIENT
Start: 2024-09-24 | End: 2024-09-27

## 2024-09-24 RX ORDER — LABETALOL HYDROCHLORIDE 5 MG/ML
5 INJECTION, SOLUTION INTRAVENOUS
Status: DISCONTINUED | OUTPATIENT
Start: 2024-09-24 | End: 2024-09-24 | Stop reason: HOSPADM

## 2024-09-24 RX ORDER — HYDROCODONE BITARTRATE AND ACETAMINOPHEN 5; 325 MG/1; MG/1
1 TABLET ORAL EVERY 4 HOURS PRN
Status: DISCONTINUED | OUTPATIENT
Start: 2024-09-24 | End: 2024-09-24 | Stop reason: HOSPADM

## 2024-09-24 RX ORDER — DROPERIDOL 2.5 MG/ML
0.62 INJECTION, SOLUTION INTRAMUSCULAR; INTRAVENOUS ONCE AS NEEDED
Status: DISCONTINUED | OUTPATIENT
Start: 2024-09-24 | End: 2024-09-24 | Stop reason: HOSPADM

## 2024-09-24 RX ORDER — FENTANYL CITRATE 50 UG/ML
50 INJECTION, SOLUTION INTRAMUSCULAR; INTRAVENOUS
Status: DISCONTINUED | OUTPATIENT
Start: 2024-09-24 | End: 2024-09-24 | Stop reason: HOSPADM

## 2024-09-24 RX ORDER — SODIUM CHLORIDE 0.9 % (FLUSH) 0.9 %
3 SYRINGE (ML) INJECTION AS NEEDED
Status: DISCONTINUED | OUTPATIENT
Start: 2024-09-24 | End: 2024-09-24 | Stop reason: HOSPADM

## 2024-09-24 RX ORDER — IBUPROFEN 600 MG/1
600 TABLET, FILM COATED ORAL EVERY 6 HOURS PRN
Status: DISCONTINUED | OUTPATIENT
Start: 2024-09-24 | End: 2024-09-24 | Stop reason: HOSPADM

## 2024-09-24 RX ORDER — DEXAMETHASONE SODIUM PHOSPHATE 4 MG/ML
INJECTION, SOLUTION INTRA-ARTICULAR; INTRALESIONAL; INTRAMUSCULAR; INTRAVENOUS; SOFT TISSUE AS NEEDED
Status: DISCONTINUED | OUTPATIENT
Start: 2024-09-24 | End: 2024-09-24 | Stop reason: SURG

## 2024-09-24 RX ORDER — DEXAMETHASONE SODIUM PHOSPHATE 4 MG/ML
4 INJECTION, SOLUTION INTRA-ARTICULAR; INTRALESIONAL; INTRAMUSCULAR; INTRAVENOUS; SOFT TISSUE ONCE AS NEEDED
Status: COMPLETED | OUTPATIENT
Start: 2024-09-24 | End: 2024-09-24

## 2024-09-24 RX ORDER — SODIUM CHLORIDE, SODIUM LACTATE, POTASSIUM CHLORIDE, CALCIUM CHLORIDE 600; 310; 30; 20 MG/100ML; MG/100ML; MG/100ML; MG/100ML
9 INJECTION, SOLUTION INTRAVENOUS CONTINUOUS
Status: DISCONTINUED | OUTPATIENT
Start: 2024-09-24 | End: 2024-09-24 | Stop reason: HOSPADM

## 2024-09-24 RX ORDER — EPHEDRINE SULFATE 50 MG/ML
INJECTION INTRAVENOUS AS NEEDED
Status: DISCONTINUED | OUTPATIENT
Start: 2024-09-24 | End: 2024-09-24 | Stop reason: SURG

## 2024-09-24 RX ORDER — TRAMADOL HYDROCHLORIDE 50 MG/1
50 TABLET ORAL EVERY 6 HOURS PRN
Qty: 12 TABLET | Refills: 0 | Status: SHIPPED | OUTPATIENT
Start: 2024-09-24 | End: 2024-10-02

## 2024-09-24 RX ORDER — BACITRACIN ZINC 500 [USP'U]/G
OINTMENT TOPICAL AS NEEDED
Status: DISCONTINUED | OUTPATIENT
Start: 2024-09-24 | End: 2024-09-24 | Stop reason: HOSPADM

## 2024-09-24 RX ORDER — LIDOCAINE HYDROCHLORIDE 10 MG/ML
0.5 INJECTION, SOLUTION EPIDURAL; INFILTRATION; INTRACAUDAL; PERINEURAL ONCE AS NEEDED
Status: DISCONTINUED | OUTPATIENT
Start: 2024-09-24 | End: 2024-09-24 | Stop reason: HOSPADM

## 2024-09-24 RX ORDER — ONDANSETRON 2 MG/ML
4 INJECTION INTRAMUSCULAR; INTRAVENOUS ONCE AS NEEDED
Status: DISCONTINUED | OUTPATIENT
Start: 2024-09-24 | End: 2024-09-24 | Stop reason: HOSPADM

## 2024-09-24 RX ORDER — LIDOCAINE HYDROCHLORIDE 20 MG/ML
INJECTION, SOLUTION EPIDURAL; INFILTRATION; INTRACAUDAL; PERINEURAL AS NEEDED
Status: DISCONTINUED | OUTPATIENT
Start: 2024-09-24 | End: 2024-09-24 | Stop reason: SURG

## 2024-09-24 RX ORDER — SODIUM CHLORIDE, SODIUM LACTATE, POTASSIUM CHLORIDE, CALCIUM CHLORIDE 600; 310; 30; 20 MG/100ML; MG/100ML; MG/100ML; MG/100ML
1000 INJECTION, SOLUTION INTRAVENOUS CONTINUOUS
Status: DISCONTINUED | OUTPATIENT
Start: 2024-09-24 | End: 2024-09-24 | Stop reason: HOSPADM

## 2024-09-24 RX ORDER — MAGNESIUM HYDROXIDE 1200 MG/15ML
LIQUID ORAL AS NEEDED
Status: DISCONTINUED | OUTPATIENT
Start: 2024-09-24 | End: 2024-09-24 | Stop reason: HOSPADM

## 2024-09-24 RX ORDER — ONDANSETRON 2 MG/ML
INJECTION INTRAMUSCULAR; INTRAVENOUS AS NEEDED
Status: DISCONTINUED | OUTPATIENT
Start: 2024-09-24 | End: 2024-09-24 | Stop reason: SURG

## 2024-09-24 RX ORDER — SCOLOPAMINE TRANSDERMAL SYSTEM 1 MG/1
1 PATCH, EXTENDED RELEASE TRANSDERMAL ONCE
Status: DISCONTINUED | OUTPATIENT
Start: 2024-09-24 | End: 2024-09-24 | Stop reason: HOSPADM

## 2024-09-24 RX ORDER — PHENYLEPHRINE HCL IN 0.9% NACL 1 MG/10 ML
SYRINGE (ML) INTRAVENOUS AS NEEDED
Status: DISCONTINUED | OUTPATIENT
Start: 2024-09-24 | End: 2024-09-24 | Stop reason: SURG

## 2024-09-24 RX ORDER — DROPERIDOL 2.5 MG/ML
INJECTION, SOLUTION INTRAMUSCULAR; INTRAVENOUS AS NEEDED
Status: DISCONTINUED | OUTPATIENT
Start: 2024-09-24 | End: 2024-09-24 | Stop reason: SURG

## 2024-09-24 RX ORDER — FLUMAZENIL 0.1 MG/ML
0.2 INJECTION INTRAVENOUS AS NEEDED
Status: DISCONTINUED | OUTPATIENT
Start: 2024-09-24 | End: 2024-09-24 | Stop reason: HOSPADM

## 2024-09-24 RX ORDER — BUPIVACAINE HYDROCHLORIDE 2.5 MG/ML
INJECTION, SOLUTION INFILTRATION; PERINEURAL AS NEEDED
Status: DISCONTINUED | OUTPATIENT
Start: 2024-09-24 | End: 2024-09-24 | Stop reason: HOSPADM

## 2024-09-24 RX ORDER — ROCURONIUM BROMIDE 10 MG/ML
INJECTION, SOLUTION INTRAVENOUS AS NEEDED
Status: DISCONTINUED | OUTPATIENT
Start: 2024-09-24 | End: 2024-09-24 | Stop reason: SURG

## 2024-09-24 RX ORDER — MIDAZOLAM HYDROCHLORIDE 2 MG/2ML
1 INJECTION, SOLUTION INTRAMUSCULAR; INTRAVENOUS
Status: DISCONTINUED | OUTPATIENT
Start: 2024-09-24 | End: 2024-09-24 | Stop reason: HOSPADM

## 2024-09-24 RX ADMIN — DEXAMETHASONE SODIUM PHOSPHATE 4 MG: 4 INJECTION INTRA-ARTICULAR; INTRALESIONAL; INTRAMUSCULAR; INTRAVENOUS; SOFT TISSUE at 07:13

## 2024-09-24 RX ADMIN — FENTANYL CITRATE 100 MCG: 50 INJECTION, SOLUTION INTRAMUSCULAR; INTRAVENOUS at 07:07

## 2024-09-24 RX ADMIN — DEXAMETHASONE SODIUM PHOSPHATE 4 MG: 4 INJECTION, SOLUTION INTRA-ARTICULAR; INTRALESIONAL; INTRAMUSCULAR; INTRAVENOUS; SOFT TISSUE at 07:00

## 2024-09-24 RX ADMIN — FENTANYL CITRATE 50 MCG: 50 INJECTION, SOLUTION INTRAMUSCULAR; INTRAVENOUS at 08:34

## 2024-09-24 RX ADMIN — EPHEDRINE SULFATE 5 MG: 50 INJECTION INTRAVENOUS at 07:51

## 2024-09-24 RX ADMIN — CEFAZOLIN 2000 MG: 1 INJECTION, POWDER, FOR SOLUTION INTRAMUSCULAR; INTRAVENOUS at 07:13

## 2024-09-24 RX ADMIN — ONDANSETRON 4 MG: 2 INJECTION INTRAMUSCULAR; INTRAVENOUS at 07:13

## 2024-09-24 RX ADMIN — ROCURONIUM BROMIDE 50 MG: 10 INJECTION, SOLUTION INTRAVENOUS at 07:08

## 2024-09-24 RX ADMIN — SUGAMMADEX 200 MG: 100 INJECTION, SOLUTION INTRAVENOUS at 08:50

## 2024-09-24 RX ADMIN — SCOPALAMINE 1 PATCH: 1 PATCH, EXTENDED RELEASE TRANSDERMAL at 06:59

## 2024-09-24 RX ADMIN — MIDAZOLAM HYDROCHLORIDE 2 MG: 1 INJECTION, SOLUTION INTRAMUSCULAR; INTRAVENOUS at 07:00

## 2024-09-24 RX ADMIN — FENTANYL CITRATE 50 MCG: 50 INJECTION, SOLUTION INTRAMUSCULAR; INTRAVENOUS at 07:40

## 2024-09-24 RX ADMIN — LIDOCAINE HYDROCHLORIDE 100 MG: 20 INJECTION, SOLUTION EPIDURAL; INFILTRATION; INTRACAUDAL; PERINEURAL at 07:08

## 2024-09-24 RX ADMIN — EPHEDRINE SULFATE 10 MG: 50 INJECTION INTRAVENOUS at 08:02

## 2024-09-24 RX ADMIN — Medication 50 MCG: at 07:26

## 2024-09-24 RX ADMIN — DROPERIDOL 0.62 MG: 2.5 INJECTION, SOLUTION INTRAMUSCULAR; INTRAVENOUS at 07:13

## 2024-09-24 RX ADMIN — PROPOFOL 150 MG: 10 INJECTION, EMULSION INTRAVENOUS at 07:08

## 2024-09-24 RX ADMIN — Medication 100 MCG: at 07:45

## 2024-09-24 RX ADMIN — SODIUM CHLORIDE, POTASSIUM CHLORIDE, SODIUM LACTATE AND CALCIUM CHLORIDE 1000 ML: 600; 310; 30; 20 INJECTION, SOLUTION INTRAVENOUS at 06:09

## (undated) DEVICE — SUT ETHLN 5/0 P3 18IN 698H

## (undated) DEVICE — GLV SURG SENSICARE W/ALOE PF LF SZ6 STRL

## (undated) DEVICE — ADHS LIQ MASTISOL 2/3ML

## (undated) DEVICE — PAD MAJOR: Brand: MEDLINE INDUSTRIES, INC.

## (undated) DEVICE — ANTIBACTERIAL UNDYED BRAIDED (POLYGLACTIN 910), SYNTHETIC ABSORBABLE SUTURE: Brand: COATED VICRYL

## (undated) DEVICE — DRSNG SURESITE WNDW 2.38X2.75

## (undated) DEVICE — GLV SURG BIOGEL LTX PF 8

## (undated) DEVICE — DRSNG SURESITE123 6X8IN

## (undated) DEVICE — Device

## (undated) DEVICE — BANDAGE,GAUZE,BULKEE II,4.5"X4.1YD,STRL: Brand: MEDLINE

## (undated) DEVICE — NEEDLE, QUINCKE 22GX3.5": Brand: MEDLINE INDUSTRIES, INC.

## (undated) DEVICE — TRAP FLD MINIVAC MEGADYNE 100ML

## (undated) DEVICE — SUT SILK 2/0 SH CR8 18IN CR8 C012D

## (undated) DEVICE — GLV SURG SENSICARE W/ALOE PF LF 6.5 STRL

## (undated) DEVICE — STRIP CLS WND CURAD MEDI/STRIP HYPOALLERG 0.25X4IN PK/10

## (undated) DEVICE — BNDG ELAS W/CLIP 6IN 10YD LF STRL

## (undated) DEVICE — RETRACTOR 135X30MM WITH BUILT-IN SINGLE-USE MULTI-LED LIGHT SOURCE - STERILE: Brand: ONETRAC LX

## (undated) DEVICE — SUT MNCRYL 4/0 PS2 27IN UD MCP426H

## (undated) DEVICE — APPL CHLORAPREP HI/LITE 26ML ORNG

## (undated) DEVICE — STPCK 4WY ON/OFF VLV M/COLAR FIT 45PSI STRL

## (undated) DEVICE — APPL CHLORAPREP 26ML CLR

## (undated) DEVICE — PREP SPRY PVPI 10P 2OZ

## (undated) DEVICE — SYR LUERLOK 50ML

## (undated) DEVICE — UNIVERSAL PACK: Brand: CARDINAL HEALTH

## (undated) DEVICE — DRAPE,ORTHOMAX ,BAR: Brand: MEDLINE

## (undated) DEVICE — PROXIMATE RH ROTATING HEAD SKIN STAPLERS (35 WIDE) CONTAINS 35 STAINLESS STEEL STAPLES: Brand: PROXIMATE

## (undated) DEVICE — CLN CAUTRY TP 2X2 STRL

## (undated) DEVICE — MASK,OXYGEN,MED CONC,ADLT,7' TUB, UC: Brand: PENDING

## (undated) DEVICE — SYR CONTRL PRESS/LO FIX/M/LL W/THMB/RNG 10ML

## (undated) DEVICE — SYS CLS SKIN PREMIERPRO EXOFINFUSION 22CM

## (undated) DEVICE — YANKAUER,BULB TIP WITH VENT: Brand: ARGYLE

## (undated) DEVICE — PREP SOL POVIDONE/IODINE BT 4OZ

## (undated) DEVICE — PAD MINOR UNIVERSAL: Brand: MEDLINE INDUSTRIES, INC.

## (undated) DEVICE — SENSR O2 OXIMAX FNGR A/ 18IN NONSTR

## (undated) DEVICE — PAD,ABDOMINAL,8"X10",ST,LF: Brand: MEDLINE

## (undated) DEVICE — GAUZE,SPONGE,2"X2",8PLY,STERILE,LF,2'S: Brand: MEDLINE

## (undated) DEVICE — SYR LL TP 10ML STRL

## (undated) DEVICE — CABL BIPOL MEGADYNE 12FT DISP

## (undated) DEVICE — BNDR ABD 4PANEL 12IN 46 TO 62IN

## (undated) DEVICE — THE SINGLE USE ETRAP – POLYP TRAP IS USED FOR SUCTION RETRIEVAL OF ENDOSCOPICALLY REMOVED POLYPS.: Brand: ETRAP

## (undated) DEVICE — SUT SILK 2/0 CX1 18IN 737G

## (undated) DEVICE — PCH INST SURG INVISISHIELD 2PCKT

## (undated) DEVICE — RESERVOIR,SUCTION,100CC,SILICONE: Brand: MEDLINE

## (undated) DEVICE — THE CHANNEL CLEANING BRUSH IS A NYLON FLEXI BRUSH ATTACHED TO A FLEXIBLE PLASTIC SHEATH DESIGNED TO SAFELY REMOVE DEBRIS FROM FLEXIBLE ENDOSCOPES.

## (undated) DEVICE — ELECTRD BLD EZ CLN MOD XLNG 2.75IN

## (undated) DEVICE — NDL HYPO PRECISIONGLIDE REG 25G 1 1/2

## (undated) DEVICE — 4-PORT MANIFOLD: Brand: NEPTUNE 2

## (undated) DEVICE — SUT ETHLN 3/0 FS1 30IN 669H

## (undated) DEVICE — GAUZE,SPONGE,FLUFF,6"X6.75",STRL,10/TRAY: Brand: MEDLINE

## (undated) DEVICE — FRCP BX RADJAW4 NDL 2.8 240 STD OG

## (undated) DEVICE — SYR CONTRL LUERLOK 10CC

## (undated) DEVICE — SUT MNCRYL 4/0 RB1 27IN UD MCP214H

## (undated) DEVICE — SYRINGE,CONTROL,LL,FINGER,GRIP: Brand: MEDLINE INDUSTRIES, INC.

## (undated) DEVICE — SNAR POLYP CAPTIVATOR MICROHEX 13 240CM

## (undated) DEVICE — 3M™ STERI-STRIP™ REINFORCED ADHESIVE SKIN CLOSURES, R1541, 1/4 IN X 3 IN (6 MM X 75 MM), 3 STRIPS/ENVELOPE: Brand: 3M™ STERI-STRIP™

## (undated) DEVICE — ROUND MODERATE PLUS PROFILE  SALINE BREAST IMPLANT SIZER, 500CC: Brand: MENTOR ROUND MODERATE PLUS PROFILE SINGLE USE SALINE BREAST IMPLANT SIZER

## (undated) DEVICE — Device: Brand: DEFENDO AIR/WATER/SUCTION AND BIOPSY VALVE

## (undated) DEVICE — SYR PRECISIONGLIDE LL 5CC 20X1 1/2IN

## (undated) DEVICE — TOWEL,OR,DSP,ST,BLUE,STD,4/PK,20PK/CS: Brand: MEDLINE

## (undated) DEVICE — 3M™ IOBAN™ 2 ANTIMICROBIAL INCISE DRAPE 6650EZ: Brand: IOBAN™ 2

## (undated) DEVICE — BAPTIST TURNOVER KIT: Brand: MEDLINE INDUSTRIES, INC.